# Patient Record
Sex: FEMALE | Race: WHITE | NOT HISPANIC OR LATINO | Employment: OTHER | URBAN - METROPOLITAN AREA
[De-identification: names, ages, dates, MRNs, and addresses within clinical notes are randomized per-mention and may not be internally consistent; named-entity substitution may affect disease eponyms.]

---

## 2017-06-05 LAB
ALBUMIN SERPL BCP-MCNC: 3.7 G/DL (ref 3.2–4.9)
ALBUMIN/GLOB SERPL: 0.9 G/DL
ALP SERPL-CCNC: 99 U/L (ref 30–99)
ALT SERPL-CCNC: 32 U/L (ref 2–50)
ANION GAP SERPL CALC-SCNC: 12 MMOL/L (ref 0–11.9)
AST SERPL-CCNC: 22 U/L (ref 12–45)
BASOPHILS # BLD AUTO: 0.5 % (ref 0–1.8)
BASOPHILS # BLD: 0.08 K/UL (ref 0–0.12)
BILIRUB SERPL-MCNC: 0.9 MG/DL (ref 0.1–1.5)
BUN SERPL-MCNC: 12 MG/DL (ref 8–22)
CALCIUM SERPL-MCNC: 9.1 MG/DL (ref 8.5–10.5)
CHLORIDE SERPL-SCNC: 101 MMOL/L (ref 96–112)
CO2 SERPL-SCNC: 21 MMOL/L (ref 20–33)
CREAT SERPL-MCNC: 0.52 MG/DL (ref 0.5–1.4)
EOSINOPHIL # BLD AUTO: 0.16 K/UL (ref 0–0.51)
EOSINOPHIL NFR BLD: 1.1 % (ref 0–6.9)
ERYTHROCYTE [DISTWIDTH] IN BLOOD BY AUTOMATED COUNT: 43.6 FL (ref 35.9–50)
GFR SERPL CREATININE-BSD FRML MDRD: >60 ML/MIN/1.73 M 2
GLOBULIN SER CALC-MCNC: 4.1 G/DL (ref 1.9–3.5)
GLUCOSE SERPL-MCNC: 119 MG/DL (ref 65–99)
HCT VFR BLD AUTO: 40 % (ref 37–47)
HGB BLD-MCNC: 13.6 G/DL (ref 12–16)
IMM GRANULOCYTES # BLD AUTO: 0.05 K/UL (ref 0–0.11)
IMM GRANULOCYTES NFR BLD AUTO: 0.3 % (ref 0–0.9)
LIPASE SERPL-CCNC: 9 U/L (ref 11–82)
LYMPHOCYTES # BLD AUTO: 3.42 K/UL (ref 1–4.8)
LYMPHOCYTES NFR BLD: 22.9 % (ref 22–41)
MCH RBC QN AUTO: 29.2 PG (ref 27–33)
MCHC RBC AUTO-ENTMCNC: 34 G/DL (ref 33.6–35)
MCV RBC AUTO: 86 FL (ref 81.4–97.8)
MONOCYTES # BLD AUTO: 0.83 K/UL (ref 0–0.85)
MONOCYTES NFR BLD AUTO: 5.6 % (ref 0–13.4)
NEUTROPHILS # BLD AUTO: 10.38 K/UL (ref 2–7.15)
NEUTROPHILS NFR BLD: 69.6 % (ref 44–72)
NRBC # BLD AUTO: 0 K/UL
NRBC BLD AUTO-RTO: 0 /100 WBC
PLATELET # BLD AUTO: 309 K/UL (ref 164–446)
PMV BLD AUTO: 10.1 FL (ref 9–12.9)
POTASSIUM SERPL-SCNC: 3.1 MMOL/L (ref 3.6–5.5)
PROT SERPL-MCNC: 7.8 G/DL (ref 6–8.2)
RBC # BLD AUTO: 4.65 M/UL (ref 4.2–5.4)
SODIUM SERPL-SCNC: 134 MMOL/L (ref 135–145)
WBC # BLD AUTO: 14.9 K/UL (ref 4.8–10.8)

## 2017-06-05 PROCEDURE — 94760 N-INVAS EAR/PLS OXIMETRY 1: CPT

## 2017-06-05 PROCEDURE — 84703 CHORIONIC GONADOTROPIN ASSAY: CPT

## 2017-06-05 PROCEDURE — 80053 COMPREHEN METABOLIC PANEL: CPT

## 2017-06-05 PROCEDURE — 99285 EMERGENCY DEPT VISIT HI MDM: CPT

## 2017-06-05 PROCEDURE — 83690 ASSAY OF LIPASE: CPT

## 2017-06-05 PROCEDURE — 36415 COLL VENOUS BLD VENIPUNCTURE: CPT

## 2017-06-05 PROCEDURE — 85025 COMPLETE CBC W/AUTO DIFF WBC: CPT

## 2017-06-05 ASSESSMENT — PAIN SCALES - GENERAL: PAINLEVEL_OUTOF10: 10

## 2017-06-06 ENCOUNTER — HOSPITAL ENCOUNTER (EMERGENCY)
Facility: MEDICAL CENTER | Age: 47
End: 2017-06-06
Attending: EMERGENCY MEDICINE
Payer: MEDICARE

## 2017-06-06 ENCOUNTER — APPOINTMENT (OUTPATIENT)
Dept: RADIOLOGY | Facility: MEDICAL CENTER | Age: 47
End: 2017-06-06
Attending: EMERGENCY MEDICINE
Payer: MEDICARE

## 2017-06-06 VITALS
TEMPERATURE: 99.4 F | HEIGHT: 69 IN | BODY MASS INDEX: 38.79 KG/M2 | RESPIRATION RATE: 16 BRPM | HEART RATE: 64 BPM | DIASTOLIC BLOOD PRESSURE: 81 MMHG | WEIGHT: 261.91 LBS | OXYGEN SATURATION: 92 % | SYSTOLIC BLOOD PRESSURE: 122 MMHG

## 2017-06-06 DIAGNOSIS — S90.822A BLISTER OF LEFT FOOT WITHOUT INFECTION, INITIAL ENCOUNTER: ICD-10-CM

## 2017-06-06 DIAGNOSIS — R10.31 RIGHT LOWER QUADRANT PAIN: ICD-10-CM

## 2017-06-06 DIAGNOSIS — D72.829 LEUKOCYTOSIS, UNSPECIFIED TYPE: ICD-10-CM

## 2017-06-06 LAB
APPEARANCE UR: CLEAR
COLOR UR AUTO: ABNORMAL
GLUCOSE UR QL STRIP.AUTO: NEGATIVE MG/DL
HCG SERPL QL: NEGATIVE
KETONES UR QL STRIP.AUTO: >=160 MG/DL
LEUKOCYTE ESTERASE UR QL STRIP.AUTO: NEGATIVE
NITRITE UR QL STRIP.AUTO: NEGATIVE
PH UR STRIP.AUTO: 6 [PH]
PROT UR QL STRIP: NEGATIVE MG/DL
RBC UR QL AUTO: NEGATIVE
SP GR UR: 1.01

## 2017-06-06 PROCEDURE — 81002 URINALYSIS NONAUTO W/O SCOPE: CPT

## 2017-06-06 PROCEDURE — 304562 HCHG STAT O2 MASK/CANNULA

## 2017-06-06 PROCEDURE — 700105 HCHG RX REV CODE 258: Performed by: EMERGENCY MEDICINE

## 2017-06-06 PROCEDURE — 700117 HCHG RX CONTRAST REV CODE 255: Performed by: EMERGENCY MEDICINE

## 2017-06-06 PROCEDURE — A9270 NON-COVERED ITEM OR SERVICE: HCPCS | Performed by: EMERGENCY MEDICINE

## 2017-06-06 PROCEDURE — 74177 CT ABD & PELVIS W/CONTRAST: CPT

## 2017-06-06 PROCEDURE — 700111 HCHG RX REV CODE 636 W/ 250 OVERRIDE (IP): Performed by: EMERGENCY MEDICINE

## 2017-06-06 PROCEDURE — 96374 THER/PROPH/DIAG INJ IV PUSH: CPT | Mod: XU

## 2017-06-06 PROCEDURE — 700102 HCHG RX REV CODE 250 W/ 637 OVERRIDE(OP): Performed by: EMERGENCY MEDICINE

## 2017-06-06 RX ORDER — GABAPENTIN 100 MG/1
CAPSULE ORAL 3 TIMES DAILY
COMMUNITY
End: 2017-06-07

## 2017-06-06 RX ORDER — OXYCODONE HYDROCHLORIDE AND ACETAMINOPHEN 5; 325 MG/1; MG/1
2 TABLET ORAL ONCE
Status: COMPLETED | OUTPATIENT
Start: 2017-06-06 | End: 2017-06-06

## 2017-06-06 RX ORDER — ONDANSETRON 2 MG/ML
4 INJECTION INTRAMUSCULAR; INTRAVENOUS ONCE
Status: COMPLETED | OUTPATIENT
Start: 2017-06-06 | End: 2017-06-06

## 2017-06-06 RX ORDER — SODIUM CHLORIDE 9 MG/ML
1000 INJECTION, SOLUTION INTRAVENOUS ONCE
Status: COMPLETED | OUTPATIENT
Start: 2017-06-06 | End: 2017-06-06

## 2017-06-06 RX ORDER — BUPROPION HYDROCHLORIDE 100 MG/1
100 TABLET ORAL 2 TIMES DAILY
COMMUNITY
End: 2017-06-07

## 2017-06-06 RX ADMIN — OXYCODONE HYDROCHLORIDE AND ACETAMINOPHEN 2 TABLET: 5; 325 TABLET ORAL at 01:40

## 2017-06-06 RX ADMIN — IOHEXOL 100 ML: 350 INJECTION, SOLUTION INTRAVENOUS at 02:29

## 2017-06-06 RX ADMIN — ONDANSETRON 4 MG: 2 INJECTION INTRAMUSCULAR; INTRAVENOUS at 01:40

## 2017-06-06 RX ADMIN — SODIUM CHLORIDE 1000 ML: 9 INJECTION, SOLUTION INTRAVENOUS at 01:34

## 2017-06-06 ASSESSMENT — ENCOUNTER SYMPTOMS
COUGH: 0
BACK PAIN: 0
CONSTIPATION: 0
VOMITING: 0
NAUSEA: 0
FEVER: 0
DIARRHEA: 0
ABDOMINAL PAIN: 1
CHILLS: 0
SHORTNESS OF BREATH: 0

## 2017-06-06 ASSESSMENT — LIFESTYLE VARIABLES: DO YOU DRINK ALCOHOL: NO

## 2017-06-06 NOTE — ED NOTES
Pt given d/c instructions/follow up care instructions, pt verbalized understanding of POC, pt ambulated to ER lobby, steady gait.

## 2017-06-06 NOTE — ED AVS SNAPSHOT
Home Care Instructions                                                                                                                Cortney Gavin   MRN: [id number]    Department:  [facility], Emergency Dept   Date of Visit:  6/5/2017            [facility], Emergency Dept    [address] [facility] [facility] [address]    Phone:  [phone]      You were seen by     Sandor Sánchez M.D.      Your Diagnosis Was     Right lower quadrant pain     R10.31       These are the medications you received during your hospitalization from 06/05/2017 1917 to 06/06/2017 0424     Date/Time Order Dose Route Action    06/06/2017 0134 NS infusion 1,000 mL 1,000 mL Intravenous New Bag    06/06/2017 0140 ondansetron (ZOFRAN) syringe/vial injection 4 mg 4 mg Intravenous Given    06/06/2017 0140 oxycodone-acetaminophen (PERCOCET) 5-325 MG per tablet 2 Tab 2 Tab Oral Given    06/06/2017 0229 iohexol (OMNIPAQUE) 350 mg/mL 100 mL Intravenous Given      Follow-up Information     1. Follow up with Your Physician. Schedule an appointment as soon as possible for a visit in 3 days.    Specialty:  Emergency Medicine    Contact information    Varies          2. Follow up with [facility].    Why:  If you need a doctor    Contact information    [address]  [facility] [id number] [phone]          3. Follow up with [facility].    Why:  If you need a doctor    Contact information    [address] [address] [phone]      Medication Information     Review all of your home medications and newly ordered medications with your primary doctor and/or pharmacist as soon as possible. Follow medication instructions as directed by your doctor and/or pharmacist.     Please keep your complete medication list with you and share with your physician. Update the information when medications are discontinued, doses are changed, or new medications (including over-the-counter products) are  added; and carry medication information at all times in the event of emergency situations.               Medication List      ASK your doctor about these medications        Instructions    Morning Afternoon Evening Bedtime    buPROPion 100 MG Tabs   Commonly known as:  WELLBUTRIN        Take 100 mg by mouth 2 times a day.   Dose:  100 mg                        gabapentin 100 MG Caps   Commonly known as:  NEURONTIN        Take  by mouth 3 times a day.                        ondansetron 4 MG Tbdp   Commonly known as:  ZOFRAN ODT        Take 1 Tab by mouth every 8 hours as needed for Nausea/Vomiting.   Dose:  4 mg                                Procedures and tests performed during your visit     CARDIAC MONITORING    CBC WITH DIFFERENTIAL    COMP METABOLIC PANEL    CONSENT FOR CONTRAST INJECTION    CT-ABDOMEN-PELVIS WITH    ESTIMATED GFR    HCG Qual Serum    IV Saline Lock    LIPASE    O2 Protocol    POC UA    Pulse Ox    SALINE LOCK        Discharge Instructions       Return if you have increasing pain, fever, severe vomiting, blood in vomit or stool.   Abdominal Pain, Possible Early Appendicitis    Please follow up with a primary physician for blood pressure management, diabetic screening, and all other preventive health concerns.     Abdominal (belly) pain can be caused by many things. Your caregiver decides the seriousness of your pain by an exam and possibly blood tests and X-rays. Many cases can be observed and treated at home. Most abdominal pain in children is functional. This means it is not caused by a disease. It will probably improve without treatment.  At this time, your caregiver feels that the abdominal pain could possibly be caused by early appendicitis. This means that you will require follow-up. You may be allowed to go home but may need to return for re-examination and repeat lab work.   HOME CARE INSTRUCTIONS   · Do not take or give laxatives unless directed by your caregiver.   · Take pain  medication only if ordered by your caregiver.   · Take no food or water by mouth unless instructed to do so by your caregiver.   SEEK IMMEDIATE MEDICAL CARE IF:   · The pain does not go away or becomes much worse.   · An oral temperature above 102° F (38.9° C) develops.   · Repeated vomiting occurs.   · Blood is being passed in stools (bright red or black tarry stools).   · You develop blood in the urine or cannot pass your urine.   · You develop severe pain in other parts of your body.   MAKE SURE YOU:   · Understand these instructions.   · Will watch your condition.   · Will get help right away if you are not doing well or get worse.   Document Released: 01/06/2009 Document Revised: 03/11/2013 Document Reviewed: 01/06/2009  ExitCare® Patient Information ©2013 [employer].      Blisters  A blister is a fluid-filled sac that forms between layers of skin. Blisters often form in areas where skin rubs against other skin or rubs against something else. The most common areas for blisters are the hands and feet.  CAUSES  A blister can be caused by:  · An injury.  · A burn.  · An allergic reaction.  · An infection.  · Exposure to irritating chemicals.  · Friction.  Friction blisters often result from:  · Sports.  · Repetitive activities.  · Shoes that are too tight or too loose.  SIGNS AND SYMPTOMS  A blister is often round and looks like a bump. It may itch or be painful to the touch. The liquid in a blister is clear or bloody. Before a blister forms, the skin may become red, feel warm, itch, or be painful to the touch.  DIAGNOSIS  A blister can usually be diagnosed from its appearance.  TREATMENT  Treatment involves protecting the area where the blister has formed until the skin has healed. If something is likely to rub against the blister, apply a bandage (dressing) with a hole in the middle over the blister.  Most blisters break open, dry up, and go away on their own within 10 days. Rarely, blisters that are very  painful may be drained before they break open on their own. Draining of a blister should only be done by a health care provider under sterile conditions.  HOME CARE INSTRUCTIONS  · Protect the area where the blister has formed as directed by your health care provider.  · Do not open or pop your blister, because it could become infected.  · If the blister is very painful, ask your health care provider whether you should have it drained.  · If the blister breaks open on its own:  ¨ Do not remove the loose skin that is over the blister.  ¨ Wash the blister area with soap and water every day.  ¨ After washing the blister area, you may apply an antibiotic cream or ointment and cover the area with a bandage.  PREVENTION  Taking these steps can help to prevent blisters that are caused by friction:  · Wear comfortable shoes that fit well.  · Always wear socks with shoes.  · Wear extra socks or use tape, bandages, or pads over blister-prone areas as needed.  · Wear protective gear, such as gloves, when participating in sports or activities that can cause blisters.  · Use powders as needed to keep your feet dry.  SEEK MEDICAL CARE IF:  · You have increased redness, swelling, or pain in the blister area.  · A puslike discharge is coming from the blister area.  · You have a fever.  · You have chills.     This information is not intended to replace advice given to you by your health care provider. Make sure you discuss any questions you have with your health care provider.     Document Released: 01/25/2006 Document Revised: 01/08/2016 Document Reviewed: 07/18/2015  Elsevier Interactive Patient Education ©2016 Elsevier Inc.            Patient Information     Patient Information    Following emergency treatment: all patient requiring follow-up care must return either to a private physician or a clinic if your condition worsens before you are able to obtain further medical attention, please return to the emergency room.     Billing  Information    At [facility], we work to make the billing process streamlined for our patients.  Our Representatives are here to answer any questions you may have regarding your hospital bill.  If you have insurance coverage and have supplied your insurance information to us, we will submit a claim to your insurer on your behalf.  Should you have any questions regarding your bill, we can be reached online or by phone as follows:  Online: You are able pay your bills online or live chat with our representatives about any billing questions you may have. We are here to help Monday - Friday from 8:00am to 7:30pm and 9:00am - 12:00pm on Saturdays.  Please visit [email or web address]  for more information.   Phone:  [phone] or [phone]    Please note that your emergency physician, surgeon, pathologist, radiologist, anesthesiologist, and other specialists are not employed by [facility] and will therefore bill separately for their services.  Please contact them directly for any questions concerning their bills at the numbers below:     Emergency Physician Services:  [phone]  [facility]:  [phone]  [facility]:  [phone]  [facility]:  [phone]    1. Your final bill may vary from the amount quoted upon discharge if all procedures are not complete at that time, or if your doctor has additional procedures of which we are not aware. You will receive an additional bill if you return to the Emergency Department at [facility] for suture removal regardless of the facility of which the sutures were placed.     2. Please arrange for settlement of this account at the emergency registration.    3. All self-pay accounts are due in full at the time of treatment.  If you are unable to meet this obligation then payment is expected within 4-5 days.     4. If you have had radiology studies (CT, X-ray, Ultrasound, MRI), you have  received a preliminary result during your emergency department visit. Please contact the radiology department [phone] to receive a copy of your final result. Please discuss the Final result with your primary physician or with the follow up physician provided.     Crisis Hotline:  [place] Crisis Hotline:  1-176-SVSWDRW or 1-343.979.5498  [place] Crisis Hotline:    1-369.616.8816 or 482-906-3386         ED Discharge Follow Up Questions    1. In order to provide you with very good care, we would like to follow up with a phone call in the next few days.  May we have your permission to contact you?     YES /  NO    2. What is the best phone number to call you? (       )_____-__________    3. What is the best time to call you?      Morning  /  Afternoon  /  Evening                   Patient Signature:  ____________________________________________________________    Date:  ____________________________________________________________      Your appointments     Jun 08, 2017 11:30 AM   New Patient with Deejay Rice M.D.   [facility] (--)    [address] [facility] [place] [address] [phone]           Please bring Photo ID, Insurance Cards, All Medication Bottles and copies of any legal documents (such as Living Will, Power of ) If speaking a language besides English please bring an adult . Please arrive 30 minutes prior for check in and registration. You will be receiving a confirmation call a few days before your appointment from our automated call confirmation system.

## 2017-06-06 NOTE — ED NOTES
"Patient bib EMS, to triage via wheelchair:  Chief Complaint   Patient presents with   • RLQ Pain     intermittent x \"few days\", no tenderness on papation.   • Blisters     left foot, multiple closed blisters on plantar aspect of foot     Patient reports she left [place] a few days ago, has been out walking since then and has been non compliant with bipolar meds.  Pt reports hx of chronic nerve pain in back.    Explained wait time and triage process to pt. Pt to triage 2 for protocol, told to notify ED tech or triage RN of any changes, verbalized understanding.    "

## 2017-06-06 NOTE — ED NOTES
Agree with triage note, pt amb to room, slow steady gait,    Pt in gown, on monitor, chart up for ERP

## 2017-06-06 NOTE — ED AVS SNAPSHOT
6/6/2017    Cortney Gavin  [address]  [place] [address]    Dear Cortney:    [facility] wants to ensure your discharge home is safe and you or your loved ones have had all of your questions answered regarding your care after you leave the hospital.    Below is a list of resources and contact information should you have any questions regarding your hospital stay, follow-up instructions, or active medical symptoms.    Questions or Concerns Regarding… Contact   Medical Questions Related to Your Discharge  (7 days a week, 8am-5pm) Contact a Nurse Care Coordinator   [phone]   Medical Questions Not Related to Your Discharge  (24 hours a day / 7 days a week)  Contact the Nurse Health Line   [phone]    Medications or Discharge Instructions Refer to your discharge packet   or contact your [facility] Primary Care Provider   [phone]   Follow-up Appointment(s) Schedule your appointment via SHAPE   or contact Scheduling [phone]   Billing Review your statement via SHAPE  or contact Billing [phone]   Medical Records Review your records via SHAPE   or contact Medical Records [phone]     You may receive a telephone call within two days of discharge. This call is to make certain you understand your discharge instructions and have the opportunity to have any questions answered. You can also easily access your medical information, test results and upcoming appointments via the SHAPE free online health management tool. You can learn more and sign up at [employer]/SHAPE. For assistance setting up your SHAPE account, please call [phone].    Once again, we want to ensure your discharge home is safe and that you have a clear understanding of any next steps in your care. If you have any questions or concerns, please do not hesitate to contact us, we are here for you. Thank you for choosing [facility] for your healthcare needs.    Sincerely,    Your [facility] Healthcare Team

## 2017-06-06 NOTE — ED NOTES
Break RN: Patient medicated per orders. Currently resting comfortably on gurney. No acute distress. No behavioral pain indicators noted.

## 2017-06-06 NOTE — ED AVS SNAPSHOT
DxUpClose Access Code: [email or web address]  Expires: 7/6/2017  4:23 AM    Your email address is not on file at [employer].  Email Addresses are required for you to sign up for DxUpClose, please contact [phone] to verify your personal information and to provide your email address prior to attempting to register for DxUpClose.    Cortney Gavin  [address] [place] [address]    DxUpClose  A secure, online tool to manage your health information     [employer]’s DxUpClose® is a secure, online tool that connects you to your personalized health information from the privacy of your home -- day or night - making it very easy for you to manage your healthcare. Once the activation process is completed, you can even access your medical information using the DxUpClose shubham, which is available for free in the Apple Shubham store or Google Play store.     To learn more about DxUpClose, visit [email or web address]    There are two levels of access available (as shown below):   My Chart Features  [facility] Primary Care Doctor [facility]  Specialists [facility]  Urgent  Care Non-[facility] Primary Care Doctor   Email your healthcare team securely and privately 24/7 X X X    Manage appointments: schedule your next appointment; view details of past/upcoming appointments X      Request prescription refills. X      View recent personal medical records, including lab and immunizations X X X X   View health record, including health history, allergies, medications X X X X   Read reports about your outpatient visits, procedures, consult and ER notes X X X X   See your discharge summary, which is a recap of your hospital and/or ER visit that includes your diagnosis, lab results, and care plan X X  X     How to register for [employer]:  Once your e-mail address has been verified, follow the following steps to sign up for [employer].     1. Go to  [email or web address]  2. Click on the Sign Up Now box, which takes you to the New Member Sign Up page.  You will need to provide the following information:  a. Enter your [employer] Access Code exactly as it appears at the top of this page. (You will not need to use this code after you’ve completed the sign-up process. If you do not sign up before the expiration date, you must request a new code.)   b. Enter your date of birth.   c. Enter your home email address.   d. Click Submit, and follow the next screen’s instructions.  3. Create a [employer] ID. This will be your [employer] login ID and cannot be changed, so think of one that is secure and easy to remember.  4. Create a [employer] password. You can change your password at any time.  5. Enter your Password Reset Question and Answer. This can be used at a later time if you forget your password.   6. Enter your e-mail address. This allows you to receive e-mail notifications when new information is available in [employer].  7. Click Sign Up. You can now view your health information.    For assistance activating your [employer] account, call [phone]

## 2017-06-06 NOTE — DISCHARGE INSTRUCTIONS
Return if you have increasing pain, fever, severe vomiting, blood in vomit or stool.   Abdominal Pain, Possible Early Appendicitis    Please follow up with a primary physician for blood pressure management, diabetic screening, and all other preventive health concerns.     Abdominal (belly) pain can be caused by many things. Your caregiver decides the seriousness of your pain by an exam and possibly blood tests and X-rays. Many cases can be observed and treated at home. Most abdominal pain in children is functional. This means it is not caused by a disease. It will probably improve without treatment.  At this time, your caregiver feels that the abdominal pain could possibly be caused by early appendicitis. This means that you will require follow-up. You may be allowed to go home but may need to return for re-examination and repeat lab work.   HOME CARE INSTRUCTIONS   · Do not take or give laxatives unless directed by your caregiver.   · Take pain medication only if ordered by your caregiver.   · Take no food or water by mouth unless instructed to do so by your caregiver.   SEEK IMMEDIATE MEDICAL CARE IF:   · The pain does not go away or becomes much worse.   · An oral temperature above 102° F (38.9° C) develops.   · Repeated vomiting occurs.   · Blood is being passed in stools (bright red or black tarry stools).   · You develop blood in the urine or cannot pass your urine.   · You develop severe pain in other parts of your body.   MAKE SURE YOU:   · Understand these instructions.   · Will watch your condition.   · Will get help right away if you are not doing well or get worse.   Document Released: 01/06/2009 Document Revised: 03/11/2013 Document Reviewed: 01/06/2009  [employer].comCare® Patient Information ©2013 [employer]sportgroup.      Blisters  A blister is a fluid-filled sac that forms between layers of skin. Blisters often form in areas where skin rubs against other skin or rubs against something else. The most common areas  for blisters are the hands and feet.  CAUSES  A blister can be caused by:  · An injury.  · A burn.  · An allergic reaction.  · An infection.  · Exposure to irritating chemicals.  · Friction.  Friction blisters often result from:  · Sports.  · Repetitive activities.  · Shoes that are too tight or too loose.  SIGNS AND SYMPTOMS  A blister is often round and looks like a bump. It may itch or be painful to the touch. The liquid in a blister is clear or bloody. Before a blister forms, the skin may become red, feel warm, itch, or be painful to the touch.  DIAGNOSIS  A blister can usually be diagnosed from its appearance.  TREATMENT  Treatment involves protecting the area where the blister has formed until the skin has healed. If something is likely to rub against the blister, apply a bandage (dressing) with a hole in the middle over the blister.  Most blisters break open, dry up, and go away on their own within 10 days. Rarely, blisters that are very painful may be drained before they break open on their own. Draining of a blister should only be done by a health care provider under sterile conditions.  HOME CARE INSTRUCTIONS  · Protect the area where the blister has formed as directed by your health care provider.  · Do not open or pop your blister, because it could become infected.  · If the blister is very painful, ask your health care provider whether you should have it drained.  · If the blister breaks open on its own:  ¨ Do not remove the loose skin that is over the blister.  ¨ Wash the blister area with soap and water every day.  ¨ After washing the blister area, you may apply an antibiotic cream or ointment and cover the area with a bandage.  PREVENTION  Taking these steps can help to prevent blisters that are caused by friction:  · Wear comfortable shoes that fit well.  · Always wear socks with shoes.  · Wear extra socks or use tape, bandages, or pads over blister-prone areas as needed.  · Wear protective gear,  such as gloves, when participating in sports or activities that can cause blisters.  · Use powders as needed to keep your feet dry.  SEEK MEDICAL CARE IF:  · You have increased redness, swelling, or pain in the blister area.  · A puslike discharge is coming from the blister area.  · You have a fever.  · You have chills.     This information is not intended to replace advice given to you by your health care provider. Make sure you discuss any questions you have with your health care provider.     Document Released: 01/25/2006 Document Revised: 01/08/2016 Document Reviewed: 07/18/2015  [employer] Interactive Patient Education ©2016 Elsevier Inc.

## 2017-06-06 NOTE — ED PROVIDER NOTES
"ED Provider Note    Scribed for Sandor Sánchez M.D. by Jonna Paz. 6/6/2017, 1:06 AM.    Primary care provider: Bianca Lara M.D.  Means of arrival: EMS  History obtained from: patient  History limited by: none    CHIEF COMPLAINT  Chief Complaint   Patient presents with   • RLQ Pain     intermittent x \"few days\", no tenderness on papation.   • Blisters     left foot, multiple closed blisters on plantar aspect of foot       HPI  Cortney Gavin is a 46 y.o. female who presents to the Emergency Department for intermittent right lower quadrant pain the last few days. Discomfort is described as sharp and does not shoot into her back. Patient also reports blisters on left foot with associated foot pain. She denies fever, chills, nausea, vomiting, diarrhea, constipation, dysuria, hematuria, vaginal discharge, chest pain, shortness of breath, cough, or rash. Patient still has her gallbladder. She is allergic to norco where lips tend to swell. She is okay with oxycodone.     REVIEW OF SYSTEMS  Review of Systems   Constitutional: Negative for fever and chills.   Respiratory: Negative for cough and shortness of breath.    Cardiovascular: Negative for chest pain.   Gastrointestinal: Positive for abdominal pain. Negative for nausea, vomiting, diarrhea and constipation.   Genitourinary: Negative for dysuria and hematuria.        Negative for vaginal discharge   Musculoskeletal: Negative for back pain.        Positive for foot pain   Skin: Negative for rash.        Positive for blisters   All other systems reviewed and are negative.  C    PAST MEDICAL HISTORY   has a past medical history of Chronic back pain and Schizophrenia (CMS-HCC).    SURGICAL HISTORY  patient denies any surgical history    SOCIAL HISTORY  Social History   Substance Use Topics   • Smoking status: Never Smoker    • Smokeless tobacco: None   • Alcohol Use: No      History   Drug Use   • Yes   • Special: Inhaled     Comment: thc       FAMILY " "HISTORY  History reviewed. No pertinent family history.    CURRENT MEDICATIONS  Home Medications     Reviewed by Vitaliy Altamirano R.N. (Registered Nurse) on 06/06/17 at 0040  Med List Status: Partial    Medication Last Dose Status    buPROPion (WELLBUTRIN) 100 MG Tab  Active    gabapentin (NEURONTIN) 100 MG Cap  Active    ondansetron (ZOFRAN ODT) 4 MG TABLET DISPERSIBLE  Active                ALLERGIES  Allergies   Allergen Reactions   • Norco [Apap-Fd&C Yellow #10 [place]-Hydrocodone] Itching       PHYSICAL EXAM  VITAL SIGNS: /81 mmHg  Pulse 87  Temp(Src) 37.4 °C (99.4 °F)  Resp 16  Ht 1.753 m (5' 9.02\")  Wt 118.8 kg (261 lb 14.5 oz)  BMI 38.66 kg/m2  SpO2 95%    Constitutional: Well developed, Well nourished, mild distress.   HENT: Normocephalic, Atraumatic, Oropharynx moist.   Eyes: Conjunctiva normal, No discharge.   Neck: Supple, No stridor  Cardiovascular: Normal heart rate, Normal rhythm, No murmurs, equal pulses.   Pulmonary: Normal breath sounds, No respiratory distress, No wheezing, No rales, No rhonchi.  Chest: No chest wall tenderness or deformity.   Abdomen:Soft, obese, moderate tenderness to right upper quadrant, No masses, no rebound, slight guarding.   Back: No CVA tenderness.   Musculoskeletal: No major deformities noted, 2x3cm blister to plantar surface of ball of left foot, no surrounding erythema  Skin: Warm, Dry, 2x3cm blister to plantar surface of ball of left foot, no surrounding erythema  Neurologic: Alert & oriented x 3, Normal motor function,  No focal deficits noted.   Psychiatric: Affect normal, Judgment normal, Mood normal.     DIAGNOSTIC STUDIES/PROCEDURES  LABS  Results for orders placed or performed during the hospital encounter of 06/06/17   CBC WITH DIFFERENTIAL   Result Value Ref Range    WBC 14.9 (H) 4.8 - 10.8 K/uL    RBC 4.65 4.20 - 5.40 M/uL    Hemoglobin 13.6 12.0 - 16.0 g/dL    Hematocrit 40.0 37.0 - 47.0 %    MCV 86.0 81.4 - 97.8 fL    MCH 29.2 27.0 - 33.0 pg    " MCHC 34.0 33.6 - 35.0 g/dL    RDW 43.6 35.9 - 50.0 fL    Platelet Count 309 164 - 446 K/uL    MPV 10.1 9.0 - 12.9 fL    Neutrophils-Polys 69.60 44.00 - 72.00 %    Lymphocytes 22.90 22.00 - 41.00 %    Monocytes 5.60 0.00 - 13.40 %    Eosinophils 1.10 0.00 - 6.90 %    Basophils 0.50 0.00 - 1.80 %    Immature Granulocytes 0.30 0.00 - 0.90 %    Nucleated RBC 0.00 /100 WBC    Neutrophils (Absolute) 10.38 (H) 2.00 - 7.15 K/uL    Lymphs (Absolute) 3.42 1.00 - 4.80 K/uL    Monos (Absolute) 0.83 0.00 - 0.85 K/uL    Eos (Absolute) 0.16 0.00 - 0.51 K/uL    Baso (Absolute) 0.08 0.00 - 0.12 K/uL    Immature Granulocytes (abs) 0.05 0.00 - 0.11 K/uL    NRBC (Absolute) 0.00 K/uL   COMP METABOLIC PANEL   Result Value Ref Range    Sodium 134 (L) 135 - 145 mmol/L    Potassium 3.1 (L) 3.6 - 5.5 mmol/L    Chloride 101 96 - 112 mmol/L    Co2 21 20 - 33 mmol/L    Anion Gap 12.0 (H) 0.0 - 11.9    Glucose 119 (H) 65 - 99 mg/dL    Bun 12 8 - 22 mg/dL    Creatinine 0.52 0.50 - 1.40 mg/dL    Calcium 9.1 8.5 - 10.5 mg/dL    AST(SGOT) 22 12 - 45 U/L    ALT(SGPT) 32 2 - 50 U/L    Alkaline Phosphatase 99 30 - 99 U/L    Total Bilirubin 0.9 0.1 - 1.5 mg/dL    Albumin 3.7 3.2 - 4.9 g/dL    Total Protein 7.8 6.0 - 8.2 g/dL    Globulin 4.1 (H) 1.9 - 3.5 g/dL    A-G Ratio 0.9 g/dL   LIPASE   Result Value Ref Range    Lipase 9 (L) 11 - 82 U/L   ESTIMATED GFR   Result Value Ref Range    GFR If African American >60 >60 mL/min/1.73 m 2    GFR If Non African American >60 >60 mL/min/1.73 m 2   HCG Qual Serum   Result Value Ref Range    Beta-Hcg Qualitative Serum Negative Negative   POC UA   Result Value Ref Range    POC Color Kristie     POC Appearance Clear     POC Glucose Negative Negative mg/dL    POC Ketones >=160 (A) Negative mg/dL    POC Specific Gravity 1.015 1.005-1.030    POC Blood Negative Negative    POC Urine PH 6.0 5.0-8.0    POC Protein Negative Negative mg/dL    POC Nitrites Negative Negative    POC Leukocyte Esterase Negative Negative   All labs  reviewed by me.    RADIOLOGY  CT-ABDOMEN-PELVIS WITH   Final Result      1.  Normal appendix.   2.  No bowel obstruction or pneumoperitoneum.   3.  Probable LEFT ovarian follicle.        The radiologist's interpretation of all radiological studies have been reviewed by me.    COURSE & MEDICAL DECISION MAKING  Pertinent Labs & Imaging studies reviewed. (See chart for details)    Differential Diagnosis includes but not limited to: appendicitis, UTI     1:11 AM - Patient seen and examined at bedside. Patient will be treated with 350mg omnipaque, 1000ml NS infusion for right lower quadrant pain, 4mg zofran, 325mg percocet. Ordered cbc with differential, comp metabolic panel, and other labs to evaluate her symptoms. She will be tested for appendicitis.    3:51 AM Patient reevaluated at bedside. Patient resting. Discussed resulted studies and that they are normal. She denies vaginal discharge. Discussed plan for discharge; I advised the patient to follow-up with [facility], and to return to the [facility] ED with any new or worsening symptoms, including fever. Patient was given the opportunity for questions. I addressed all questions or concerns at this time and they verbalize agreement to the plan of care.    Medical Decision Making: This point I'm exact etiology of the patient's right lower quadrant abdominal pain is unclear. Patient did have significant pain and an elevated white blood cell count therefore is concerned she may have acute appendicitis CT of the abdomen and pelvis is negative. Her urine is negative for UTI. She is not sexually active and denies any vaginal discharge and did not think she has an STD. The patient's blisters are intact do not appear to be infected.     The patient will return for new or worsening symptoms and is stable at the time of discharge.    The patient is referred to a primary physician for blood pressure management, diabetic screening, and for all other preventative health  concerns.    DISPOSITION:  Patient will be discharged home in stable condition.    FOLLOW UP:  Your Physician  Varies    Schedule an appointment as soon as possible for a visit in 3 days      [facility]  [address]  [place] [address]  [phone]      If you need a doctor    [address] [address]  [phone]    If you need a doctor      OUTPATIENT MEDICATIONS:  New Prescriptions    No medications on file     FINAL IMPRESSION  1. Right lower quadrant pain    2. Leukocytosis, unspecified type    3. Blister of left foot without infection, initial encounter       Jonna RIVAS (Alex), am scribing for, and in the presence of, Sandor Sánchez M.D.    Electronically signed by: Jonna Lauren), 6/6/2017    Sandor RIVAS M.D. personally performed the services described in this documentation, as scribed by Jonna Paz in my presence, and it is both accurate and complete.    The note accurately reflects work and decisions made by me.  Sandor Sánchez  6/6/2017  5:27 AM

## 2017-06-07 ENCOUNTER — HOSPITAL ENCOUNTER (EMERGENCY)
Facility: MEDICAL CENTER | Age: 47
End: 2017-06-08
Attending: EMERGENCY MEDICINE
Payer: MEDICARE

## 2017-06-07 DIAGNOSIS — F32.A DEPRESSION, UNSPECIFIED DEPRESSION TYPE: ICD-10-CM

## 2017-06-07 DIAGNOSIS — R45.851 SUICIDAL IDEATION: ICD-10-CM

## 2017-06-07 LAB — TSH SERPL DL<=0.005 MIU/L-ACNC: 2.05 UIU/ML (ref 0.3–3.7)

## 2017-06-07 PROCEDURE — 84443 ASSAY THYROID STIM HORMONE: CPT

## 2017-06-07 PROCEDURE — 700102 HCHG RX REV CODE 250 W/ 637 OVERRIDE(OP): Performed by: PSYCHIATRY & NEUROLOGY

## 2017-06-07 PROCEDURE — A9270 NON-COVERED ITEM OR SERVICE: HCPCS | Performed by: PSYCHIATRY & NEUROLOGY

## 2017-06-07 PROCEDURE — A9270 NON-COVERED ITEM OR SERVICE: HCPCS | Performed by: EMERGENCY MEDICINE

## 2017-06-07 PROCEDURE — 700102 HCHG RX REV CODE 250 W/ 637 OVERRIDE(OP): Performed by: EMERGENCY MEDICINE

## 2017-06-07 PROCEDURE — 99285 EMERGENCY DEPT VISIT HI MDM: CPT

## 2017-06-07 PROCEDURE — 90791 PSYCH DIAGNOSTIC EVALUATION: CPT

## 2017-06-07 RX ORDER — TRAZODONE HYDROCHLORIDE 50 MG/1
150 TABLET ORAL
Status: DISCONTINUED | OUTPATIENT
Start: 2017-06-07 | End: 2017-06-08 | Stop reason: HOSPADM

## 2017-06-07 RX ORDER — ARIPIPRAZOLE 10 MG/1
10 TABLET ORAL DAILY
Status: DISCONTINUED | OUTPATIENT
Start: 2017-06-07 | End: 2017-06-08

## 2017-06-07 RX ADMIN — TRAZODONE HYDROCHLORIDE 150 MG: 50 TABLET ORAL at 22:55

## 2017-06-07 RX ADMIN — ARIPIPRAZOLE 10 MG: 10 TABLET ORAL at 15:00

## 2017-06-07 ASSESSMENT — PAIN SCALES - GENERAL: PAINLEVEL_OUTOF10: 0

## 2017-06-07 NOTE — ED NOTES
Received fax from [facility]  Pt was discharged with prescriptions for:  Claritin 10mg QD  Trazodone 150mg QHS  Tegretol 200mg BID  Geodon 60mg QHS  Wellbutrin 100mg QAM  Gabapentin 300mg TID  Ibuprofen 800mg QAM

## 2017-06-07 NOTE — ED NOTES
"PT was recently treated at [facility] (maybe) for SI. SHe is still feeling hopeless, depressed and suicidal. When they discharged her on her medications she was told to follow up with her psychiatrist, she did not follow up because \"I am trying to find a new DR because I do not like my old doctor\" pt has not been taking her medications. Link at  for evaluation  "

## 2017-06-07 NOTE — ED NOTES
AMB to [facility] pod with this RN and . Pt amb up self, but has chronic foot pain that she takes medications for at home. Rx to come down and verify and start home med's per ERP order. Pt aware. Familiarized with pod and staff. Pt denies any needs at this time. .
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
## 2017-06-07 NOTE — ED NOTES
Left a message with [facility] medical records to call or fax information from when pt was admitted  Will follow up tomorrow if no information is given by then

## 2017-06-07 NOTE — ED NOTES
RN Rounding Note: Patient resting in bed with eyes closed, awakens easily to voice, no needs at this time.
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
## 2017-06-07 NOTE — ED NOTES
"Med rec complete per pt at bedside   Pt states she was discharged from [facility] sometime last  Week. They gave her prescriptions but pt states she \"misplaced them\"  She thinks she was taking gabapentin, tegretol and wellbutrin, unknown doses  Does not remember what pharmacy she has filled at in the past  Attempting to call [facility] to obtain this information, have not been able  To speak to anyone or leave a message yet (just rings)  Allergies reviewed with pt  Pt denies ABX in last month  "

## 2017-06-07 NOTE — ED PROVIDER NOTES
ED Provider Note    CHIEF COMPLAINT  Chief Complaint   Patient presents with   • Suicidal Ideation   • Depression       HPI  Cortney Gavin is a 46 y.o. female who presents with depression, for the last day. Suicidal ideation, has been thinking about walking out in traffic because of her depression. Denies alcohol denies other drug. Evidently long history of depression. Evidently her daughter had been murdered several years ago and has been depressed since then.  As explained to me that she was told at one time she had a breast mass, however she has never been a little file, she does not know what side of his arm. No breast pain  REVIEW OF SYSTEMS  See HPI for further details. History of schizophrenia chronic back pain Denies other G.I., G.U.. endrocine, cardiovascular, respriatory or neurological problems. All other systems are negative.     PAST MEDICAL HISTORY  Past Medical History   Diagnosis Date   • Chronic back pain    • Schizophrenia (CMS-HCC)        FAMILY HISTORY  No family history on file.    SOCIAL HISTORY  Social History     Social History   • Marital Status: Single     Spouse Name: N/A   • Number of Children: N/A   • Years of Education: N/A     Social History Main Topics   • Smoking status: Never Smoker    • Smokeless tobacco: Not on file   • Alcohol Use: No   • Drug Use: Yes     Special: Inhaled      Comment: thc   • Sexual Activity: Not on file     Other Topics Concern   • Not on file     Social History Narrative       SURGICAL HISTORY  No past surgical history on file.    CURRENT MEDICATIONS  Home Medications     **Home medications have not yet been reviewed for this encounter**          ALLERGIES  Allergies   Allergen Reactions   • Norco [Apap-Fd&C Yellow #10 [place]-Hydrocodone] Itching       PHYSICAL EXAM  VITAL SIGNS: /88 mmHg  Pulse 89  Temp(Src) 36.7 °C (98.1 °F)  Resp 16  Wt 118 kg (260 lb 2.3 oz)  SpO2 93%  Constitutional: Well developed, Well nourished, No acute distress,  Non-toxic appearance.   HENT: Normocephalic, Atraumatic, Bilateral external ears normal, Oropharynx moist, No oral exudates, Nose normal.   Eyes: PERRL, EOMI, Conjunctiva normal, No discharge.   Neck: Normal range of motion, No tenderness, Supple, No stridor.   Lymphatic: No lymphadenopathy noted.   Cardiovascular: Normal heart rate, Normal rhythm, No murmurs, No rubs, No gallops.   Thorax & Lungs: Normal breath sounds, No respiratory distress, No wheezing, No chest tenderness. States she has a history of breast tumor however on FAST exam was unable to find any evidence of mass, right or left  Abdomen:  No tenderness, no guarding no rigidity and the abdomen is soft.  No masses, No pulsatile masses.  Skin: Warm, Dry, No erythema, No rash.   Back: No tenderness, No CVA tenderness.   Extremities: Intact distal pulses, No edema, No tenderness, No cyanosis, No clubbing.   Musculoskeletal: Good range of motion in all major joints. No tenderness to palpation or major deformities noted.   Neurologic: Alert & oriented x 3, Normal motor function, Normal sensory function, No focal deficits noted.   Psychiatric: Affect is one of depression, mood is one of sadness, suicidal ideation no hallucinations.      RADIOLOGY/PROCEDURES      COURSE & MEDICAL DECISION MAKING  Pertinent Labs & Imaging studies reviewed. (See chart for details)    Unable to find any breast mass on exam. She does have suicidal ideation, is being assessed by the psychiatric service.  FINAL IMPRESSION  1.   1. Depression, unspecified depression type    2. Suicidal ideation        2.   3.     Disposition  This patient will be transferred to psychiatric facility. Legal forearms facilitating transport and transfer have been filled out.  Electronically signed by: Manuel Bee, 6/7/2017 9:35 AM

## 2017-06-08 VITALS
OXYGEN SATURATION: 96 % | SYSTOLIC BLOOD PRESSURE: 121 MMHG | HEART RATE: 72 BPM | TEMPERATURE: 98.8 F | RESPIRATION RATE: 18 BRPM | WEIGHT: 260.14 LBS | BODY MASS INDEX: 38.4 KG/M2 | DIASTOLIC BLOOD PRESSURE: 69 MMHG

## 2017-06-08 LAB
AMPHET UR QL SCN: NEGATIVE
BARBITURATES UR QL SCN: NEGATIVE
BENZODIAZ UR QL SCN: NEGATIVE
BZE UR QL SCN: NEGATIVE
CANNABINOIDS UR QL SCN: NEGATIVE
HCG UR QL: NEGATIVE
MDMA UR QL SCN: NEGATIVE
METHADONE UR QL SCN: NEGATIVE
OPIATES UR QL SCN: NEGATIVE
OXYCODONE UR QL SCN: NEGATIVE
PCP UR QL SCN: NEGATIVE
POC BREATHALIZER: 0 PERCENT (ref 0–0.01)
PROPOXYPH UR QL SCN: NEGATIVE
SP GR UR REFRACTOMETRY: 1.03

## 2017-06-08 PROCEDURE — A9270 NON-COVERED ITEM OR SERVICE: HCPCS | Performed by: EMERGENCY MEDICINE

## 2017-06-08 PROCEDURE — 302970 POC BREATHALIZER

## 2017-06-08 PROCEDURE — 700102 HCHG RX REV CODE 250 W/ 637 OVERRIDE(OP): Performed by: EMERGENCY MEDICINE

## 2017-06-08 PROCEDURE — A9270 NON-COVERED ITEM OR SERVICE: HCPCS | Performed by: PSYCHIATRY & NEUROLOGY

## 2017-06-08 PROCEDURE — 80307 DRUG TEST PRSMV CHEM ANLYZR: CPT

## 2017-06-08 PROCEDURE — 81025 URINE PREGNANCY TEST: CPT

## 2017-06-08 PROCEDURE — 99285 EMERGENCY DEPT VISIT HI MDM: CPT

## 2017-06-08 PROCEDURE — 700102 HCHG RX REV CODE 250 W/ 637 OVERRIDE(OP): Performed by: PSYCHIATRY & NEUROLOGY

## 2017-06-08 RX ORDER — ARIPIPRAZOLE 10 MG/1
15 TABLET ORAL DAILY
Status: DISCONTINUED | OUTPATIENT
Start: 2017-06-09 | End: 2017-06-08 | Stop reason: HOSPADM

## 2017-06-08 RX ORDER — LORAZEPAM 1 MG/1
1 TABLET ORAL ONCE
Status: COMPLETED | OUTPATIENT
Start: 2017-06-08 | End: 2017-06-08

## 2017-06-08 RX ADMIN — LORAZEPAM 1 MG: 1 TABLET ORAL at 15:25

## 2017-06-08 RX ADMIN — TRAZODONE HYDROCHLORIDE 150 MG: 50 TABLET ORAL at 21:17

## 2017-06-08 RX ADMIN — ARIPIPRAZOLE 10 MG: 10 TABLET ORAL at 09:42

## 2017-06-08 ASSESSMENT — PAIN SCALES - GENERAL: PAINLEVEL_OUTOF10: 0

## 2017-06-08 NOTE — ED NOTES
Pt resting in bed in NAD.  Pt calm and cooperative at this time, denies needs.  Will continue to monitor.

## 2017-06-08 NOTE — DISCHARGE PLANNING
Medical Social Work    Referral: Legal Hold    Intervention: Legal Hold Paperwork given to SW by [employer] RN Link    Legal Hold Initiated: Date: 6/7/2017 Time: 0945    Patient’s Insurance Listed on Face Sheet: Medicare/FFS    Referrals sent to: JERRY,YOMI,[facility]    This referral contains the following information:  1) Face sheet __X__  2) Page 1 and Page 2 of Legal Hold X____  3) Alert Team Assessment/Psych Assessment _X___  4) Head to toe physical exam _X___  5) Urine Drug Screen __X__  6) Blood Alcohol __X__  7) Vital signs __X__  8) Pregnancy test when applicable _X__  9) Medications list __X__    Fax confirmation received.     Plan: Patient will transfer to mental health facility once acceptance is obtained

## 2017-06-08 NOTE — DISCHARGE PLANNING
Alert team note:  Patient sleeping now.  No concerns expressed by RN.  Continue legal hold as written.

## 2017-06-08 NOTE — ED NOTES
Patients vital rechecked, VSS. Patient calm and cooperative at this time. Will continue to monitor.

## 2017-06-08 NOTE — ED NOTES
Pt laying bed talking to herself loudly.  PT was asked to keep her voice down.  Pt compliant at this time.

## 2017-06-08 NOTE — ED NOTES
PT was walking around with barefeet.  Had pt wash feet and put on hospital socks.  This does not help with Pt's pain but to help with pt safety.

## 2017-06-08 NOTE — PSYCHIATRY
"PSYCHIATRIC FOLLOW UP:    Reason for Admission: si  Legal hold status:   Hold extended  Psychiatric Supervising Attending:         HPI:     47 y/o woman with BAD, she was here with suicidal ideation, recent admission, very psychotic. Tolerating medication and feels like voices are getting better but states \"no one understands they don't come all the time. I don't always need medication for it. I just take it prn\". It is noted that she speaks to herself most of the time she is not engaged in a conversation with staff.       Psychiatric Examination: observed phenomenon:  Vitals:  Filed Vitals:    06/08/17 0000 06/08/17 0400 06/08/17 0800 06/08/17 1200   BP: 112/59 103/60 107/58 120/59   Pulse: 71 68 66 76   Temp: 36.9 °C (98.4 °F) 36.4 °C (97.6 °F) 36.9 °C (98.5 °F) 37.1 °C (98.7 °F)   Resp: 14 14 14 14   Weight:       SpO2: 91% 91% 93% 95%       Musculoskeletal(abnormal movements, gait, etc):psychomotor retardation , patient is talking to herself.   Appearance:grooming poor   Thoughts: circumstantial   Speech:tone flat   Mood:    depressed  Affect:    mildlyl labile   SI/HI:   +  Attention/Alertness:   Awake, aler   Memory:    Grossly intact   Orientation:      Fund of Knowledge:    decreased  Insight/Judgement into symptoms poor   Neurological Testing:( ie clock, cube drawing, MMSE, MOCA,etc.)    Medical systems reviewed: (pain, new complaint, etc)           Lab results/tests:        Assessment:(acuity level)          Bipolar Disorder     Plan:  legal hold: hold extended  Increasing abilify to 15mg po daily           Continue trazodone   "

## 2017-06-08 NOTE — ED NOTES
Pt resting in bed in NAD with eyes closed.  Symmetrical chest rise and fall.  Will continue to monitor.

## 2017-06-08 NOTE — ED NOTES
Patient's home medications have been reviewed by the pharmacy team.     Past Medical History   Diagnosis Date   • Chronic back pain    • Schizophrenia (CMS-[facility])      Fax from [facility] confirmed these were the meds he had been discharged with:  Claritin 10mg QD  Trazodone 150mg QHS  Tegretol 200mg BID  Geodon 60mg QHS  Wellbutrin 100mg QAM  Gabapentin 300mg TID  Ibuprofen 800mg QAM     A: He has not been compliant with his medications.             P:    No recommendations at this time. Patient has been evaluated by the psychiatry team and ordered Abilify 10 mg daily and Trazodone 150 mg QHS.      Nubia Sylvester

## 2017-06-08 NOTE — ED NOTES
Pt complaining on feet and leg pain and having twitching in the lower extremitys.  Called up to green pod and past on the complaint for the pt.

## 2017-06-08 NOTE — ED NOTES
Rounded on tee pod, pt resting watching basketball game at this time, requesting something for bilateral leg pain

## 2017-06-08 NOTE — ED PROVIDER NOTES
ER Provider Addendum Note     Scribed for CONNER QUIÑONEZ by Twyla Toth on 6/8/2017 at 1:12 PM.     This is an addendum to the note on Cortney Gavin.  For further details and full chart entry, see the previously signed ED Provider Note written by Dr. Manuel Bee ([facility]).    1:07 PM On evaluation, patient is doing well.  She offers no complaints.  Awaiting transfer at this time.      ITwyla (Scribe), am scribing for, and in the presence of, Conner Quiñonez M.D.    Electronically signed by: Twyla Toth (Scribe), 6/8/2017    Conner RIVAS M.D. personally performed the services described in this documentation, as scribed by Twyla Toth in my presence, and it is both accurate and complete.      The note accurately reflects work and decisions made by me.  Conner Quiñonez  6/8/2017  1:41 PM

## 2017-06-09 NOTE — DISCHARGE PLANNING
Arranged patient's transport to [facility] at 2130 via [facility].  Miya(DILLAN) notified.

## 2017-06-09 NOTE — DISCHARGE PLANNING
Medical Social Work:  SW contacted by Bipin at [facility] that they would accept pt. Dr. Garcia would be accepting physcian.  Transfer packet complete with original hold . MTM contacted and transfer PCS form sent to [facility].  RN notified of transfer time and packet placed on chart.

## 2017-06-18 ENCOUNTER — HOSPITAL ENCOUNTER (EMERGENCY)
Facility: MEDICAL CENTER | Age: 47
End: 2017-06-18
Attending: EMERGENCY MEDICINE
Payer: MEDICARE

## 2017-06-18 VITALS
WEIGHT: 276 LBS | RESPIRATION RATE: 20 BRPM | HEART RATE: 104 BPM | SYSTOLIC BLOOD PRESSURE: 138 MMHG | OXYGEN SATURATION: 95 % | TEMPERATURE: 98 F | HEIGHT: 69 IN | DIASTOLIC BLOOD PRESSURE: 94 MMHG | BODY MASS INDEX: 40.88 KG/M2

## 2017-06-18 VITALS
RESPIRATION RATE: 18 BRPM | SYSTOLIC BLOOD PRESSURE: 117 MMHG | HEART RATE: 84 BPM | DIASTOLIC BLOOD PRESSURE: 72 MMHG | WEIGHT: 260.36 LBS | TEMPERATURE: 98.7 F | BODY MASS INDEX: 38.43 KG/M2

## 2017-06-18 DIAGNOSIS — F31.9 BIPOLAR 1 DISORDER (HCC): ICD-10-CM

## 2017-06-18 DIAGNOSIS — Z91.148 NON COMPLIANCE W MEDICATION REGIMEN: ICD-10-CM

## 2017-06-18 DIAGNOSIS — G62.9 NEUROPATHY: ICD-10-CM

## 2017-06-18 LAB
POC BREATHALIZER: 0 PERCENT (ref 0–0.01)
POC BREATHALIZER: 0 PERCENT (ref 0–0.01)

## 2017-06-18 PROCEDURE — 302970 POC BREATHALIZER: Performed by: EMERGENCY MEDICINE

## 2017-06-18 PROCEDURE — 700102 HCHG RX REV CODE 250 W/ 637 OVERRIDE(OP): Performed by: EMERGENCY MEDICINE

## 2017-06-18 PROCEDURE — 302970 POC BREATHALIZER

## 2017-06-18 PROCEDURE — 99283 EMERGENCY DEPT VISIT LOW MDM: CPT

## 2017-06-18 PROCEDURE — A9270 NON-COVERED ITEM OR SERVICE: HCPCS | Performed by: EMERGENCY MEDICINE

## 2017-06-18 PROCEDURE — 302449 STATCHG TRIAGE ONLY (STATISTIC)

## 2017-06-18 RX ORDER — GABAPENTIN 300 MG/1
300 CAPSULE ORAL ONCE
Status: COMPLETED | OUTPATIENT
Start: 2017-06-18 | End: 2017-06-18

## 2017-06-18 RX ADMIN — GABAPENTIN 300 MG: 300 CAPSULE ORAL at 23:07

## 2017-06-18 ASSESSMENT — PAIN SCALES - GENERAL: PAINLEVEL_OUTOF10: 10

## 2017-06-18 NOTE — ED AVS SNAPSHOT
Home Care Instructions                                                                                                                Cortney Gavin   MRN: [id number]    Department:  [facility], Emergency Dept   Date of Visit:  6/18/2017            [facility], Emergency Dept    [address] [place] [id number]    Phone:  [phone]      You were seen by     Ania Rosa M.D.      Your Diagnosis Was     Neuropathy (CMS-HCC)     G62.9       These are the medications you received during your hospitalization from 06/18/2017 2200 to 06/18/2017 2309     Date/Time Order Dose Route Action    06/18/2017 2307 gabapentin (NEURONTIN) capsule 300 mg 300 mg Oral Given      Follow-up Information     1. Follow up with [facility], Emergency Dept.    Specialty:  Emergency Medicine    Why:  If symptoms worsen    Contact information    [address] [place] [id number] [phone]      Medication Information     Review all of your home medications and newly ordered medications with your primary doctor and/or pharmacist as soon as possible. Follow medication instructions as directed by your doctor and/or pharmacist.     Please keep your complete medication list with you and share with your physician. Update the information when medications are discontinued, doses are changed, or new medications (including over-the-counter products) are added; and carry medication information at all times in the event of emergency situations.               Medication List      Notice     You have not been prescribed any medications.            Procedures and tests performed during your visit     POC BREATHALIZER        Discharge Instructions       Mood Disorders  Mood disorders are conditions that affect the way a person feels emotionally. The main mood disorders include:  · Depression.  · Bipolar disorder.  · Dysthymia. Dysthymia is a mild, lasting (chronic) depression.  Symptoms of dysthymia are similar to depression, but not as severe.  · Cyclothymia. Cyclothymia includes mood swings, but the highs and lows are not as severe as they are in bipolar disorder. Symptoms of cyclothymia are similar to those of bipolar disorder, but less extreme.  CAUSES   Mood disorders are probably caused by a combination of factors. People with mood disorders seem to have physical and chemical changes in their brains. Mood disorders run in families, so there may be genetic causes. Severe trauma or stressful life events may also increase the risk of mood disorders.   SYMPTOMS   Symptoms of mood disorders depend on the specific type of condition.  Depression symptoms include:  · Feeling sad, worthless, or hopeless.  · Negative thoughts.  · Inability to enjoy one's usual activities.  · Low energy.  · Sleeping too much or too little.  · Appetite changes.  · Crying.  · Concentration problems.  · Thoughts of harming oneself.  Bipolar disorder symptoms include:  · Periods of depression (see above symptoms).  · Mood swings, from sadness and depression, to abnormal elation and excitement.  · Periods of alvarado:  · Racing thoughts.  · Fast speech.  · Poor judgment, and careless, dangerous choices.  · Decreased need for sleep.  · Risky behavior.  · Difficulty concentrating.  · Irritability.  · Increased energy.  · Increased sex drive.  DIAGNOSIS   There are no blood tests or X-rays that can confirm a mood disorder. However, your caregiver may choose to run some tests to make sure that there is not another physical cause for your symptoms. A mood disorder is usually diagnosed after an in-depth interview with a caregiver.  TREATMENT   Mood disorders can be treated with one or more of the following:  · Medicine. This may include antidepressants, mood-stabilizers, or anti-psychotics.  · Psychotherapy (talk therapy).  · Cognitive behavioral therapy. You are taught to recognize negative thoughts and behavior patterns,  and replace them with healthy thoughts and behaviors.  · Electroconvulsive therapy. For very severe cases of deep depression, a series of treatments in which an electrical current is applied to the brain.  · Vagus nerve stimulation. A pulse of electricity is applied to a portion of the brain.  · Transcranial magnetic stimulation. Powerful magnets are placed on the head that produce electrical currents.  · Hospitalization. In severe situations, or when someone is having serious thoughts of harming him or herself, hospitalization may be necessary in order to keep the person safe. This is also done to quickly start and monitor treatment.  HOME CARE INSTRUCTIONS   · Take your medicine exactly as directed.  · Attend all of your therapy sessions.  · Try to eat regular, healthy meals.  · Exercise daily. Exercise may improve mood symptoms.  · Get good sleep.  · Do not drink alcohol or use pot or other drugs. These can worsen mood symptoms and cause anxiety and psychosis.  · Tell your caregiver if you develop any side effects, such as feeling sick to your stomach (nauseous), dry mouth, dizziness, constipation, drowsiness, tremor, weight gain, or sexual symptoms. He or she may suggest things you can do to improve symptoms.  · Learn ways to cope with the stress of having a chronic illness. This includes yoga, meditation, terri chi, or participating in a support group.  · Drink enough water to keep your urine clear or pale yellow. Eat a high-fiber diet. These habits may help you avoid constipation from your medicine.  SEEK IMMEDIATE MEDICAL CARE IF:  · Your mood worsens.  · You have thoughts of hurting yourself or others.  · You cannot care for yourself.  · You develop the sensation of hearing or seeing something that is not actually present (auditory or visual hallucinations).  · You develop abnormal thoughts.  Document Released: 10/15/2010 Document Revised: 03/11/2013 Document Reviewed: 10/15/2010  [employer]® Patient Information  ©2014 [employer].    Peripheral Nerve Problems  Peripheral nerve disorders are problems with the nerves in your arms or legs.  CAUSES   There are many different causes of these disorders. They include:  · Injury.   · Diabetes.   · Chronic alcoholism.   · Toxic chemicals and drugs.   · Vitamin deficiencies.   · Tumors.   · Liver or kidney diseases.   SYMPTOMS   Some of the problems caused are:  · Tingling, burning, pain, and numbness in the extremities and feet.   · Weakness, loss of muscle tone, and size.   DIAGNOSIS   Sometimes blood tests and studies to examine nerve function are needed to make the diagnosis.   TREATMENT   Sometimes peripheral nerve problems can be treated with vitamins, medication, and avoiding known toxins such as alcohol. Please make a follow-up appointment to be sure you are getting better with treatment.   SEEK MEDICAL CARE IF:   · You are not better after one week of treatment.   · You have worsening of problems or have breathing trouble.   Document Released: 01/25/2006 Document Revised: 03/11/2013 Document Reviewed: 12/18/2006  [employer]® Patient Information ©2013 [employer].          Patient Information     Patient Information    Following emergency treatment: all patient requiring follow-up care must return either to a private physician or a clinic if your condition worsens before you are able to obtain further medical attention, please return to the emergency room.     Billing Information    At [facility], we work to make the billing process streamlined for our patients.  Our Representatives are here to answer any questions you may have regarding your hospital bill.  If you have insurance coverage and have supplied your insurance information to us, we will submit a claim to your insurer on your behalf.  Should you have any questions regarding your bill, we can be reached online or by phone as follows:  Online: You are able pay your bills online or live chat with our  representatives about any billing questions you may have. We are here to help Monday - Friday from 8:00am to 7:30pm and 9:00am - 12:00pm on Saturdays.  Please visit [email or web address]  for more information.   Phone:  [phone] or [phone]    Please note that your emergency physician, surgeon, pathologist, radiologist, anesthesiologist, and other specialists are not employed by [facility] and will therefore bill separately for their services.  Please contact them directly for any questions concerning their bills at the numbers below:     Emergency Physician Services:  [phone]  [facility]:  [phone]  [facility]:  [phone]  [facility]:  [phone]    1. Your final bill may vary from the amount quoted upon discharge if all procedures are not complete at that time, or if your doctor has additional procedures of which we are not aware. You will receive an additional bill if you return to the Emergency Department at [facility] for suture removal regardless of the facility of which the sutures were placed.     2. Please arrange for settlement of this account at the emergency registration.    3. All self-pay accounts are due in full at the time of treatment.  If you are unable to meet this obligation then payment is expected within 4-5 days.     4. If you have had radiology studies (CT, X-ray, Ultrasound, MRI), you have received a preliminary result during your emergency department visit. Please contact the radiology department [phone] to receive a copy of your final result. Please discuss the Final result with your primary physician or with the follow up physician provided.     Crisis Hotline:  [place] Crisis Hotline:  9-306-TVGCOJH or 1-750.978.8711  [place] Crisis Hotline:    1-776.108.5667 or 157-927-9737         ED Discharge Follow Up Questions    1. In order to provide you with very good care, we would  like to follow up with a phone call in the next few days.  May we have your permission to contact you?     YES /  NO    2. What is the best phone number to call you? (       )_____-__________    3. What is the best time to call you?      Morning  /  Afternoon  /  Evening                   Patient Signature:  ____________________________________________________________    Date:  ____________________________________________________________

## 2017-06-18 NOTE — ED AVS SNAPSHOT
6/18/2017    Cortney Gavin  [address]  [place] [address]    Dear Cortney:    [facility] wants to ensure your discharge home is safe and you or your loved ones have had all of your questions answered regarding your care after you leave the hospital.    Below is a list of resources and contact information should you have any questions regarding your hospital stay, follow-up instructions, or active medical symptoms.    Questions or Concerns Regarding… Contact   Medical Questions Related to Your Discharge  (7 days a week, 8am-5pm) Contact a Nurse Care Coordinator   [phone]   Medical Questions Not Related to Your Discharge  (24 hours a day / 7 days a week)  Contact the Nurse Health Line   [phone]    Medications or Discharge Instructions Refer to your discharge packet   or contact your [facility] Primary Care Provider   [phone]   Follow-up Appointment(s) Schedule your appointment via [employer]   or contact Scheduling [phone]   Billing Review your statement via [employer]  or contact Billing [phone]   Medical Records Review your records via [employer]   or contact Medical Records [phone]     You may receive a telephone call within two days of discharge. This call is to make certain you understand your discharge instructions and have the opportunity to have any questions answered. You can also easily access your medical information, test results and upcoming appointments via the [employer] free online health management tool. You can learn more and sign up at [employer]. For assistance setting up your [employer] account, please call [phone].    Once again, we want to ensure your discharge home is safe and that you have a clear understanding of any next steps in your care. If you have any questions or concerns, please do not hesitate to contact us, we are here for you. Thank you for choosing [facility] for your healthcare needs.    Sincerely,    Your [facility] Healthcare Team

## 2017-06-18 NOTE — ED AVS SNAPSHOT
Introhive Access Code: [email or web address]  Expires: 7/6/2017  4:23 AM    Your email address is not on file at [facility].  Email Addresses are required for you to sign up for Introhive, please contact [phone] to verify your personal information and to provide your email address prior to attempting to register for Introhive.    Cortney Gavin  [address] [place] [address]    Introhive  A secure, online tool to manage your health information     [facility]’s Introhive® is a secure, online tool that connects you to your personalized health information from the privacy of your home -- day or night - making it very easy for you to manage your healthcare. Once the activation process is completed, you can even access your medical information using the Introhive shubham, which is available for free in the Apple Shubham store or Google Play store.     To learn more about Introhive, visit [email or web address]    There are two levels of access available (as shown below):   My Chart Features  [facility] Primary Care Doctor [facility]  Specialists [facility]  Urgent  Care Non-[facility] Primary Care Doctor   Email your healthcare team securely and privately 24/7 X X X    Manage appointments: schedule your next appointment; view details of past/upcoming appointments X      Request prescription refills. X      View recent personal medical records, including lab and immunizations X X X X   View health record, including health history, allergies, medications X X X X   Read reports about your outpatient visits, procedures, consult and ER notes X X X X   See your discharge summary, which is a recap of your hospital and/or ER visit that includes your diagnosis, lab results, and care plan X X  X     How to register for Image Metricst:  Once your e-mail address has been verified, follow the following steps to sign up for Image Metricst.     1. Go to  [email or web address]  2. Click on the Sign Up Now box, which takes you to the New Member Sign Up page.  You will need to provide the following information:  a. Enter your SendUs Access Code exactly as it appears at the top of this page. (You will not need to use this code after you’ve completed the sign-up process. If you do not sign up before the expiration date, you must request a new code.)   b. Enter your date of birth.   c. Enter your home email address.   d. Click Submit, and follow the next screen’s instructions.  3. Create a Emtricst ID. This will be your SendUs login ID and cannot be changed, so think of one that is secure and easy to remember.  4. Create a SendUs password. You can change your password at any time.  5. Enter your Password Reset Question and Answer. This can be used at a later time if you forget your password.   6. Enter your e-mail address. This allows you to receive e-mail notifications when new information is available in SendUs.  7. Click Sign Up. You can now view your health information.    For assistance activating your SendUs account, call [phone]

## 2017-06-19 NOTE — DISCHARGE INSTRUCTIONS
Mood Disorders  Mood disorders are conditions that affect the way a person feels emotionally. The main mood disorders include:  · Depression.  · Bipolar disorder.  · Dysthymia. Dysthymia is a mild, lasting (chronic) depression. Symptoms of dysthymia are similar to depression, but not as severe.  · Cyclothymia. Cyclothymia includes mood swings, but the highs and lows are not as severe as they are in bipolar disorder. Symptoms of cyclothymia are similar to those of bipolar disorder, but less extreme.  CAUSES   Mood disorders are probably caused by a combination of factors. People with mood disorders seem to have physical and chemical changes in their brains. Mood disorders run in families, so there may be genetic causes. Severe trauma or stressful life events may also increase the risk of mood disorders.   SYMPTOMS   Symptoms of mood disorders depend on the specific type of condition.  Depression symptoms include:  · Feeling sad, worthless, or hopeless.  · Negative thoughts.  · Inability to enjoy one's usual activities.  · Low energy.  · Sleeping too much or too little.  · Appetite changes.  · Crying.  · Concentration problems.  · Thoughts of harming oneself.  Bipolar disorder symptoms include:  · Periods of depression (see above symptoms).  · Mood swings, from sadness and depression, to abnormal elation and excitement.  · Periods of alvarado:  · Racing thoughts.  · Fast speech.  · Poor judgment, and careless, dangerous choices.  · Decreased need for sleep.  · Risky behavior.  · Difficulty concentrating.  · Irritability.  · Increased energy.  · Increased sex drive.  DIAGNOSIS   There are no blood tests or X-rays that can confirm a mood disorder. However, your caregiver may choose to run some tests to make sure that there is not another physical cause for your symptoms. A mood disorder is usually diagnosed after an in-depth interview with a caregiver.  TREATMENT   Mood disorders can be treated with one or more of the  following:  · Medicine. This may include antidepressants, mood-stabilizers, or anti-psychotics.  · Psychotherapy (talk therapy).  · Cognitive behavioral therapy. You are taught to recognize negative thoughts and behavior patterns, and replace them with healthy thoughts and behaviors.  · Electroconvulsive therapy. For very severe cases of deep depression, a series of treatments in which an electrical current is applied to the brain.  · Vagus nerve stimulation. A pulse of electricity is applied to a portion of the brain.  · Transcranial magnetic stimulation. Powerful magnets are placed on the head that produce electrical currents.  · Hospitalization. In severe situations, or when someone is having serious thoughts of harming him or herself, hospitalization may be necessary in order to keep the person safe. This is also done to quickly start and monitor treatment.  HOME CARE INSTRUCTIONS   · Take your medicine exactly as directed.  · Attend all of your therapy sessions.  · Try to eat regular, healthy meals.  · Exercise daily. Exercise may improve mood symptoms.  · Get good sleep.  · Do not drink alcohol or use pot or other drugs. These can worsen mood symptoms and cause anxiety and psychosis.  · Tell your caregiver if you develop any side effects, such as feeling sick to your stomach (nauseous), dry mouth, dizziness, constipation, drowsiness, tremor, weight gain, or sexual symptoms. He or she may suggest things you can do to improve symptoms.  · Learn ways to cope with the stress of having a chronic illness. This includes yoga, meditation, terri chi, or participating in a support group.  · Drink enough water to keep your urine clear or pale yellow. Eat a high-fiber diet. These habits may help you avoid constipation from your medicine.  SEEK IMMEDIATE MEDICAL CARE IF:  · Your mood worsens.  · You have thoughts of hurting yourself or others.  · You cannot care for yourself.  · You develop the sensation of hearing or seeing  something that is not actually present (auditory or visual hallucinations).  · You develop abnormal thoughts.  Document Released: 10/15/2010 Document Revised: 03/11/2013 Document Reviewed: 10/15/2010  In[employer]alatorCare® Patient Information ©2014 [employer].    Peripheral Nerve Problems  Peripheral nerve disorders are problems with the nerves in your arms or legs.  CAUSES   There are many different causes of these disorders. They include:  · Injury.   · Diabetes.   · Chronic alcoholism.   · Toxic chemicals and drugs.   · Vitamin deficiencies.   · Tumors.   · Liver or kidney diseases.   SYMPTOMS   Some of the problems caused are:  · Tingling, burning, pain, and numbness in the extremities and feet.   · Weakness, loss of muscle tone, and size.   DIAGNOSIS   Sometimes blood tests and studies to examine nerve function are needed to make the diagnosis.   TREATMENT   Sometimes peripheral nerve problems can be treated with vitamins, medication, and avoiding known toxins such as alcohol. Please make a follow-up appointment to be sure you are getting better with treatment.   SEEK MEDICAL CARE IF:   · You are not better after one week of treatment.   · You have worsening of problems or have breathing trouble.   Document Released: 01/25/2006 Document Revised: 03/11/2013 Document Reviewed: 12/18/2006  [employer]® Patient Information ©2013 [employer].

## 2017-06-19 NOTE — ED PROVIDER NOTES
"ED Provider Note    Scribed for No att. providers found by Patt Brasher. 6/18/2017, 11:39 PM.    Primary care provider: Pcp Pt States None  Means of arrival: Walk-in  History obtained from: Patient  History limited by: None    CHIEF COMPLAINT  Chief Complaint   Patient presents with   • Foot Pain     pt with throbbing to feet radiating up to knees and back, ambulatory to triage room barefeet, pt states walked here, recently released from [facility] and was sent to a group home and her prescriptions were not being filled due to insurance so she states she needs her psych medications   • Suicidal Ideation     pt states \"I was suicidal earlier but now I am ok\" co frequent crying episodes        HPI  Cortney Gavin is a 46 y.o. female who presents to the Emergency Department for evaluation of foot pain and suicidal ideation onset today. Per patient, her foot pain is radiating up her knees and to her back. She reports that she has also been experiencing swelling in her ankle. The patient states that she was recently released from [facility] and her prescriptions have not been filled due to her insurance.  The patient denies any suicidal ideation at this time. She has a history of Bipolar Disorder and Schizophrenia.    REVIEW OF SYSTEMS  See HPI for further details. All other systems are negative.   E.    PAST MEDICAL HISTORY   has a past medical history of Chronic back pain and Schizophrenia (CMS-HCC).    SURGICAL HISTORY  patient denies any surgical history    SOCIAL HISTORY  Social History   Substance Use Topics   • Smoking status: Never Smoker    • Smokeless tobacco: None   • Alcohol Use: No      History   Drug Use   • Yes   • Special: Inhaled     Comment: thc       FAMILY HISTORY  History reviewed. No pertinent family history.    CURRENT MEDICATIONS  Reviewed. See Encounter Summary.     ALLERGIES  Allergies   Allergen Reactions   • Norco [Apap-Fd&C Yellow #10 [place]-Hydrocodone] Itching       PHYSICAL " EXAM  VITAL SIGNS: /72 mmHg  Pulse 84  Temp(Src) 37.1 °C (98.7 °F)  Resp 18  Wt 118.1 kg (260 lb 5.8 oz)   Pulse ox interpretation: I interpret this pulse ox as normal.  Constitutional: Alert in no apparent distress.  HENT: Normocephalic, Atraumatic  Eyes: Pupils are equal and reactive. Conjunctiva normal, non-icteric.   Heart: Regular rate and rythm, no murmurs.    Lungs: Clear to auscultation bilaterally.  Abdomen: Non-tender, non-distended, normal bowel sounds  Extremities: Scars to left ankle and chronic joint swelling of lateral aspect with no warmth, DP pulses 2+  Neurologic: Alert, Grossly non-focal.   Psychiatric: Affect normal, Mood normal. Poor judgement, no HI or SI. Impulsive.    DIFFERENTIAL DIAGNOSIS AND WORK UP PLAN    10:38 PM Patient seen and examined at bedside. The patient presents with foot pain and suicidal ideation and the differential diagnosis includes but is not limited to psychosis, medication non-compliance, drug abuse. Patient will be treated with 300 mg Gabapentin capsule. Ordered POC Breathilizer to evaluate her symptoms.     DIAGNOSTIC STUDIES / PROCEDURES       LABS  Labs Reviewed   POC BREATHALIZER - Normal     All labs were reviewed by me.    COURSE & MEDICAL DECISION MAKING  Pertinent Labs & Imaging studies reviewed. (See chart for details)    This is a 46 y.o. year old female who presents with a history of psychosis and neuropathy. The patient is to multiple medications was seen earlier today but left has poor judgment currently is not meet criteria for a hold is not suicidal homicidal knows where she is alert and oriented. The patient given a dose for gabapentin and will be discharged.    /72 mmHg  Pulse 84  Temp(Src) 37.1 °C (98.7 °F)  Resp 18  Wt 118.1 kg (260 lb 5.8 oz)      The patient will return for new or worsening symptoms and is stable at the time of discharge.    DISPOSITION:  Patient will be discharged home in stable condition.    FOLLOW UP:  [facility]  [facility], Emergency Dept  [address] [id number] [phone]    If symptoms worsen      FINAL IMPRESSION  1. Neuropathy (CMS-HCC)    2. Non compliance w medication regimen    3. Bipolar 1 disorder (CMS-HCC)          IPatt (Scribe), am scribing for, and in the presence of, No att. providers found.    Electronically signed by: Patt Brasher (Scribe), 6/18/2017    I, No att. providers found personally performed the services described in this documentation, as scribed by Patt Brasher in my presence, and it is both accurate and complete.    The note accurately reflects work and decisions made by me.  Ania Rosa  6/19/2017  4:11 AM      This dictation has been created using voice recognition software and/or scribes. The accuracy of the dictation is limited by the abilities of the software and the expertise of the scribes. I expect there may be some errors of grammar and possibly content. I made every attempt to manually correct the errors within my dictation. However, errors related to voice recognition software and/or scribes may still exist and should be interpreted within the appropriate context.

## 2017-06-19 NOTE — ED NOTES
"Chief Complaint   Patient presents with   • Off Psych Meds     x3days     Pt bib ems, per report pt was d/c from [facility] into group home 3days ago. She said she \"I feel off\".   She also c/o feet pain, possible neuropathy.   Pt denies hi/si.  "

## 2017-06-19 NOTE — DISCHARGE PLANNING
"Alert team note:  Patient told Diana, ER Triage RN, \"I haven't had my medications at the group home [facility] sent me too.\"  That she wasn't sure what facility she had gone to or what her medications were.  Called [facility] who said she was discharged from 2 days ago.  (In her chart, it says she was transferred to [facility] on 6-8-2017, accepted by Dr Garcia.)  She heard this writer ask the Intake person about getting her medication list.  The patient stormed out of the office.  Cursing and swearing.  Pushed the exterior doors so hard she nearly broke it.  Fully able to walk.  She said she did not bring her shoes because it hurt too much to walk.  She denied any SI/HI.  Not holdable. She left the grounds of [facility].  "

## 2017-06-19 NOTE — ED NOTES
"Pt got up and said \"I am out of here\" while talking to [employer]. left before physician contact, ambulated steady feet.  "

## 2017-06-21 ENCOUNTER — HOSPITAL ENCOUNTER (EMERGENCY)
Facility: MEDICAL CENTER | Age: 47
End: 2017-06-22
Attending: EMERGENCY MEDICINE
Payer: MEDICARE

## 2017-06-21 DIAGNOSIS — T50.905A MEDICATION SIDE EFFECTS, INITIAL ENCOUNTER: ICD-10-CM

## 2017-06-21 PROCEDURE — 99283 EMERGENCY DEPT VISIT LOW MDM: CPT

## 2017-06-21 PROCEDURE — 304562 HCHG STAT O2 MASK/CANNULA

## 2017-06-21 RX ORDER — ZIPRASIDONE HYDROCHLORIDE 60 MG/1
60 CAPSULE ORAL 2 TIMES DAILY
COMMUNITY
End: 2017-09-01

## 2017-06-21 RX ORDER — CARBAMAZEPINE 200 MG/1
200 TABLET ORAL DAILY
COMMUNITY
End: 2017-09-01

## 2017-06-21 RX ORDER — GABAPENTIN 300 MG/1
300 CAPSULE ORAL 2 TIMES DAILY
COMMUNITY
End: 2019-08-22

## 2017-06-21 RX ORDER — BUPROPION HYDROCHLORIDE 100 MG/1
100 TABLET, EXTENDED RELEASE ORAL 2 TIMES DAILY
COMMUNITY
End: 2019-08-22

## 2017-06-21 RX ORDER — QUETIAPINE FUMARATE 200 MG/1
200 TABLET, FILM COATED ORAL ONCE
COMMUNITY
End: 2017-07-19

## 2017-06-21 RX ORDER — IBUPROFEN 200 MG
400 TABLET ORAL EVERY 6 HOURS PRN
Status: SHIPPED | COMMUNITY
End: 2022-06-08

## 2017-06-21 RX ORDER — TRAZODONE HYDROCHLORIDE 150 MG/1
100 TABLET ORAL NIGHTLY PRN
COMMUNITY
End: 2021-06-04

## 2017-06-21 ASSESSMENT — PAIN SCALES - GENERAL: PAINLEVEL_OUTOF10: 0

## 2017-06-21 NOTE — ED AVS SNAPSHOT
Home Care Instructions                                                                                                                Cortney Gavin   MRN: [id number]    Department:  [facility], Emergency Dept   Date of Visit:  6/21/2017            [facility], Emergency Dept    [address] [facility] [address]    Phone:  [phone]      You were seen by     Dylon Villagomez M.D.      Your Diagnosis Was     Medication side effects, initial encounter     T88.7XXA       Follow-up Information     1. Follow up with [facility].    Contact information    [address] [place] [address] [phone]      Medication Information     Review all of your home medications and newly ordered medications with your primary doctor and/or pharmacist as soon as possible. Follow medication instructions as directed by your doctor and/or pharmacist.     Please keep your complete medication list with you and share with your physician. Update the information when medications are discontinued, doses are changed, or new medications (including over-the-counter products) are added; and carry medication information at all times in the event of emergency situations.               Medication List      ASK your doctor about these medications        Instructions    Morning Afternoon Evening Bedtime    buPROPion  MG Tb12   Commonly known as:  WELLBUTRIN-SR        Take 100 mg by mouth 2 times a day.   Dose:  100 mg                        carbamazepine 200 MG Tabs   Commonly known as:  TEGRETOL        Take 200 mg by mouth 2 Times a Day.   Dose:  200 mg                        gabapentin 300 MG Caps   Commonly known as:  NEURONTIN        Take 300 mg by mouth 3 times a day.   Dose:  300 mg                        ibuprofen 800 MG Tabs   Commonly known as:  MOTRIN        Take 800 mg by mouth every day.   Dose:  800 mg                        quetiapine 200 MG Tabs   Commonly known as:   SEROQUEL        Take 200 mg by mouth Once.   Dose:  200 mg                        trazodone 150 MG Tabs   Commonly known as:  DESYREL        Take 150 mg by mouth every evening.   Dose:  150 mg                        ziprasidone 60 MG Caps   Commonly known as:  GEODON        Take 60 mg by mouth 2 Times a Day.   Dose:  60 mg                                Procedures and tests performed during your visit     NURSING COMMUNICATION        Discharge Instructions       Polypharmacy Problems    Do not take seroquel, trazodone and neurontin at the same time.   Follow up with your doctor to discuss medications.   Polypharmacy problems can occur when you take more than one medicine. Your caregivers need to know about all the medicines you take. This includes vitamins, herbs, eyedrops, over-the-counter medicines, prescription medicines, and creams. Drug interaction problems can include:  · Bad reactions or side effects. This can occur when certain drugs are taken together.  · Reduced benefit from your medicines. This can occur if one drug reduces the ability of another drug to work.  Some drug interaction problems can be life-threatening. Your caregivers can coordinate a plan to help you avoid polypharmacy problems.  RISK FACTORS  Polypharmacy problems are more likely to occur if:  · You have many caregivers prescribing different medicines for you. This is a common problem for elderly patients.  · You have a long-term (chronic) medical condition.  · You have had an organ transplant.  · You have human immunodeficiency virus (HIV).  · You are undergoing chemotherapy.  · You have hepatitis.  · You have kidney disease or kidney failure.  · You have significant heart disease or lung disease.  · You are elderly.  · You are taking medicines that have a low margin of error. This means there is little difference between the right amount of medicine and too much medicine. Medicines in this category  include:  ¨ Warfarin.  ¨ Digoxin.  ¨ Lithium.  ¨ Theophylline.  ¨ Monoamine oxidase (MAO) inhibitors.  ¨ Seizure medicines.  ¨ Cyclosporine.  PREVENTION   · Choose one caregiver to be in charge of coordinating your medicines. Inform this caregiver about all medicines and supplements you take, even if they are prescribed by another caregiver.  · Purchase all your medicines through the same pharmacy. The pharmacy keeps track of your medicines and possible drug interactions. They can notify your caregiver of potential problems.  · Read the information given to you at your pharmacy.  · Carry a list of all your medicines and their doses with you. This informs your caregivers, emergency department caregivers, and specialists about the medicines you are taking. This is especially important before you start a new medicine.  · Use a system to keep track of which medicines you are taking and when. Many patients use a pillbox that separates medicines by the day and time they are supposed to be taken.  · Carry a list of your chronic medical problems with you. Conditions such as kidney problems, liver problems, organ transplants, and chronic viral infections affect the way your body handles medicines.  · Ask your caregiver or pharmacist if you have any questions about your medicines.  SEEK MEDICAL CARE IF:  · You have any problems that may be caused by your medicines.  · You have chest pain.  · You have shortness of breath.  · You have an irregular heartbeat.  · You have fainting spells.  · You have shaking or tremors.  · You have weakness or tiredness (lethargy).  · You have a rash or swelling in any part of the body.  · You have increased bleeding, rectal bleeding, vaginal bleeding, or you bruise easily.  · You have abdominal pain.  · You have nausea.  · You have vomiting.     This information is not intended to replace advice given to you by your health care provider. Make sure you discuss any questions you have with your  health care provider.     Document Released: 01/25/2006 Document Revised: 01/08/2016 Document Reviewed: 09/05/2012  Elsevier Interactive Patient Education ©2016 Elsevier Inc.            Patient Information     Patient Information    Following emergency treatment: all patient requiring follow-up care must return either to a private physician or a clinic if your condition worsens before you are able to obtain further medical attention, please return to the emergency room.     Billing Information    At [facility], we work to make the billing process streamlined for our patients.  Our Representatives are here to answer any questions you may have regarding your hospital bill.  If you have insurance coverage and have supplied your insurance information to us, we will submit a claim to your insurer on your behalf.  Should you have any questions regarding your bill, we can be reached online or by phone as follows:  Online: You are able pay your bills online or live chat with our representatives about any billing questions you may have. We are here to help Monday - Friday from 8:00am to 7:30pm and 9:00am - 12:00pm on Saturdays.  Please visit [email or web address]  for more information.   Phone:  [phone] or [phone]    Please note that your emergency physician, surgeon, pathologist, radiologist, anesthesiologist, and other specialists are not employed by [facility] and will therefore bill separately for their services.  Please contact them directly for any questions concerning their bills at the numbers below:     Emer[facility] Physician Services:  [phone]  [facility]:  [phone]  [facility]:  [phone]  [facility]:  [phone]    1. Your final bill may vary from the amount quoted upon discharge if all procedures are not complete at that time, or if your doctor has additional procedures of which we are not aware. You will  receive an additional bill if you return to the Emergency Department at [facility]placed by [facility] for suture removal regardless of the facility of which the sutures were placed.     2. Please arrange for settlement of this account at the emergency registration.    3. All self-pay accounts are due in full at the time of treatment.  If you are unable to meet this obligation then payment is expected within 4-5 days.     4. If you have had radiology studies (CT, X-ray, Ultrasound, MRI), you have received a preliminary result during your emergency department visit. Please contact the radiology department [phone] to receive a copy of your final result. Please discuss the Final result with your primary physician or with the follow up physician provided.     Crisis Hotline:  [place] Crisis Hotline:  5-665-CIDBYVN or 1-535.684.8085  [place] Crisis Hotline:    1-684.620.8632 or 319-973-5831         ED Discharge Follow Up Questions    1. In order to provide you with very good care, we would like to follow up with a phone call in the next few days.  May we have your permission to contact you?     YES /  NO    2. What is the best phone number to call you? (       )_____-__________    3. What is the best time to call you?      Morning  /  Afternoon  /  Evening                   Patient Signature:  ____________________________________________________________    Date:  ____________________________________________________________

## 2017-06-21 NOTE — ED AVS SNAPSHOT
6/22/2017    Cortney Gavin  [address]  [facility] [address]    Dear Cortney:    [facility] wants to ensure your discharge home is safe and you or your loved ones have had all of your questions answered regarding your care after you leave the hospital.    Below is a list of resources and contact information should you have any questions regarding your hospital stay, follow-up instructions, or active medical symptoms.    Questions or Concerns Regarding… Contact   Medical Questions Related to Your Discharge  (7 days a week, 8am-5pm) Contact a Nurse Care Coordinator   [phone]   Medical Questions Not Related to Your Discharge  (24 hours a day / 7 days a week)  Contact the Nurse Health Line   [phone]    Medications or Discharge Instructions Refer to your discharge packet   or contact your [facility] Primary Care Provider   [phone]   Follow-up Appointment(s) Schedule your appointment via [employer]   or contact Scheduling [phone]   Billing Review your statement via [employer]  or contact Billing [phone]   Medical Records Review your records via [employer]   or contact Medical Records [phone]     You may receive a telephone call within two days of discharge. This call is to make certain you understand your discharge instructions and have the opportunity to have any questions answered. You can also easily access your medical information, test results and upcoming appointments via the [employer] free online health management tool. You can learn more and sign up at [email or web address]. For assistance setting up your [employer] account, please call [phone].    Once again, we want to ensure your discharge home is safe and that you have a clear understanding of any next steps in your care. If you have any questions or concerns, please do not hesitate to contact us, we are here for you. Thank you for choosing [facility] for your healthcare needs.    Sincerely,    Your [facility] Healthcare Team

## 2017-06-21 NOTE — ED AVS SNAPSHOT
Motion Engine Access Code: [email or web address]  Expires: 7/6/2017  4:23 AM    Your email address is not on file at [facility].  Email Addresses are required for you to sign up for Motion Engine, please contact [phone] to verify your personal information and to provide your email address prior to attempting to register for Motion Engine.    Cortney Gavin  [address] [place] [address]    Motion Engine  A secure, online tool to manage your health information     [facility]’s Motion Engine® is a secure, online tool that connects you to your personalized health information from the privacy of your home -- day or night - making it very easy for you to manage your healthcare. Once the activation process is completed, you can even access your medical information using the Motion Engine shubham, which is available for free in the Apple Shubham store or Google Play store.     To learn more about Motion Engine, visit [email or web address]    There are two levels of access available (as shown below):   My Chart Features  [facility] Primary Care Doctor [facility]  Specialists [facility]  Urgent  Care Non-[facility] Primary Care Doctor   Email your healthcare team securely and privately 24/7 X X X    Manage appointments: schedule your next appointment; view details of past/upcoming appointments X      Request prescription refills. X      View recent personal medical records, including lab and immunizations X X X X   View health record, including health history, allergies, medications X X X X   Read reports about your outpatient visits, procedures, consult and ER notes X X X X   See your discharge summary, which is a recap of your hospital and/or ER visit that includes your diagnosis, lab results, and care plan X X  X     How to register for Naartjiet:  Once your e-mail address has been verified, follow the following steps to sign up for Naartjiet.     1. Go to  [email or web address]  2. Click on the Sign Up Now box, which takes you to the New Member Sign Up page.  You will need to provide the following information:  a. Enter your [employer] Access Code exactly as it appears at the top of this page. (You will not need to use this code after you’ve completed the sign-up process. If you do not sign up before the expiration date, you must request a new code.)   b. Enter your date of birth.   c. Enter your home email address.   d. Click Submit, and follow the next screen’s instructions.  3. Create a EDANt ID. This will be your [employer] login ID and cannot be changed, so think of one that is secure and easy to remember.  4. Create a [employer] password. You can change your password at any time.  5. Enter your Password Reset Question and Answer. This can be used at a later time if you forget your password.   6. Enter your e-mail address. This allows you to receive e-mail notifications when new information is available in [employer].  7. Click Sign Up. You can now view your health information.    For assistance activating your [employer] account, call [phone]

## 2017-06-22 VITALS
SYSTOLIC BLOOD PRESSURE: 105 MMHG | TEMPERATURE: 97.9 F | HEIGHT: 69 IN | HEART RATE: 58 BPM | RESPIRATION RATE: 16 BRPM | WEIGHT: 260 LBS | BODY MASS INDEX: 38.51 KG/M2 | DIASTOLIC BLOOD PRESSURE: 51 MMHG | OXYGEN SATURATION: 100 %

## 2017-06-22 NOTE — ED NOTES
BIB EMS    Chief Complaint   Patient presents with   • Other     pt filled meds today and took all her meds around 1700 including day and night medications and is drowsy/ sleepying. pt sent by shelter since they cannot monnitor her.        Pt sleeping, on spo2 and BP monitor, chart up for ERP

## 2017-06-22 NOTE — DISCHARGE INSTRUCTIONS
Polypharmacy Problems    Do not take seroquel, trazodone and neurontin at the same time.   Follow up with your doctor to discuss medications.   Polypharmacy problems can occur when you take more than one medicine. Your caregivers need to know about all the medicines you take. This includes vitamins, herbs, eyedrops, over-the-counter medicines, prescription medicines, and creams. Drug interaction problems can include:  · Bad reactions or side effects. This can occur when certain drugs are taken together.  · Reduced benefit from your medicines. This can occur if one drug reduces the ability of another drug to work.  Some drug interaction problems can be life-threatening. Your caregivers can coordinate a plan to help you avoid polypharmacy problems.  RISK FACTORS  Polypharmacy problems are more likely to occur if:  · You have many caregivers prescribing different medicines for you. This is a common problem for elderly patients.  · You have a long-term (chronic) medical condition.  · You have had an organ transplant.  · You have human immunodeficiency virus (HIV).  · You are undergoing chemotherapy.  · You have hepatitis.  · You have kidney disease or kidney failure.  · You have significant heart disease or lung disease.  · You are elderly.  · You are taking medicines that have a low margin of error. This means there is little difference between the right amount of medicine and too much medicine. Medicines in this category include:  ¨ Warfarin.  ¨ Digoxin.  ¨ Lithium.  ¨ Theophylline.  ¨ Monoamine oxidase (MAO) inhibitors.  ¨ Seizure medicines.  ¨ Cyclosporine.  PREVENTION   · Choose one caregiver to be in charge of coordinating your medicines. Inform this caregiver about all medicines and supplements you take, even if they are prescribed by another caregiver.  · Purchase all your medicines through the same pharmacy. The pharmacy keeps track of your medicines and possible drug interactions. They can notify your  caregiver of potential problems.  · Read the information given to you at your pharmacy.  · Carry a list of all your medicines and their doses with you. This informs your caregivers, emergency department caregivers, and specialists about the medicines you are taking. This is especially important before you start a new medicine.  · Use a system to keep track of which medicines you are taking and when. Many patients use a pillbox that separates medicines by the day and time they are supposed to be taken.  · Carry a list of your chronic medical problems with you. Conditions such as kidney problems, liver problems, organ transplants, and chronic viral infections affect the way your body handles medicines.  · Ask your caregiver or pharmacist if you have any questions about your medicines.  SEEK MEDICAL CARE IF:  · You have any problems that may be caused by your medicines.  · You have chest pain.  · You have shortness of breath.  · You have an irregular heartbeat.  · You have fainting spells.  · You have shaking or tremors.  · You have weakness or tiredness (lethargy).  · You have a rash or swelling in any part of the body.  · You have increased bleeding, rectal bleeding, vaginal bleeding, or you bruise easily.  · You have abdominal pain.  · You have nausea.  · You have vomiting.     This information is not intended to replace advice given to you by your health care provider. Make sure you discuss any questions you have with your health care provider.     Document Released: 01/25/2006 Document Revised: 01/08/2016 Document Reviewed: 09/05/2012  [employer] Interactive Patient Education ©2016 [employer] Inc.

## 2017-06-22 NOTE — ED NOTES
Pt given d/c instructions/follow up/home care instructions, pt verbalized understanding of POC, pt ambulated to ER lobby, steady gait.

## 2017-06-22 NOTE — ED PROVIDER NOTES
ED Provider Note    Scribed for Dylon Villagomez M.D. by Colleen Bonilla. 6/21/2017, 8:05 PM.    Primary care provider: Pcp Pt States None  Means of arrival: Ambulance   History obtained from: Patient  History limited by: None     CHIEF COMPLAINT  Chief Complaint   Patient presents with   • Other     pt filled meds today and took all her meds around 1700 including day and night medications and is drowsy/ sleepying. pt sent by shelter since they cannot monnitor her.        HPI  Cortney Gavin is a 46 y.o. female who presents to the Emergency Department after taking all of her daytime and night time medications together 3 hours prior to exam. This was the first day the patient has taken all of her medications. Patient has been sleeping since she took the medications. She notes worsening bilateral ankle pain. She was sent to the ED from the shelter because they cannot accept her in her current state of sleep. She denies fever or chills.     The full HPI is limited as the patient is quite somnolent. She does awaken and respond to questions appropriately however she does fall asleep promptly after answering simple questions.    REVIEW OF SYSTEMS  See HPI, full review of systems unobtainable and unreliable secondary to patient's somnolence.    PAST MEDICAL HISTORY   has a past medical history of Chronic back pain and Schizophrenia (CMS-HCC).    SURGICAL HISTORY  patient denies any surgical history    SOCIAL HISTORY  Social History   Substance Use Topics   • Smoking status: Never Smoker    • Smokeless tobacco: None   • Alcohol Use: No      History   Drug Use   • Yes   • Special: Inhaled     Comment: thc       FAMILY HISTORY  History reviewed. No pertinent family history.    CURRENT MEDICATIONS  Reviewed.  See Encounter Summary.     ALLERGIES  Allergies   Allergen Reactions   • Norco [Apap-Fd&C Yellow #10 [place]-Hydrocodone] Itching       PHYSICAL EXAM  VITAL SIGNS: /51 mmHg  Pulse 77  Temp(Src) 36.6 °C (97.9 °F)   "Resp 18  Ht 1.753 m (5' 9\")  Wt 117.935 kg (260 lb)  BMI 38.38 kg/m2  SpO2 98%  Constitutional: In no apparent distress. Somnolent, easily arousable to voice.   HENT: Normocephalic, atraumatic, Bilateral external ears normal. Nose normal.   Eyes: Conjunctiva normal, non-icteric, EOMI.    Thorax & Lungs: Easy unlabored respirations, Clear to ascultation bilaterally.  Cardiovascular: Regular rate, Regular rhythm, No murmurs, rubs or gallops.  Abdomen:  Soft, nontender, no masses   Skin: Visualized skin is  Dry, No erythema, No rash.   Extremities:   No cyanosis, clubbing or edema.  Neurologic: Somnolent, arousable, Grossly non-focal.   Psychiatric: Normal affect, Normal mood    COURSE & MEDICAL DECISION MAKING  Pertinent Labs & Imaging studies reviewed. (See chart for details)    8:05 PM - Patient seen and examined at bedside. Patient is somnolent, however she is easily arousable to voice.       2:10 AM- the patient is ambulatory to the bathroom, she will be discharged.    Decision Making:  This is a 46 y.o. year old female who presents with hypersomnolence. She states that she took all of her medications at once. I did review the medications, they consist of Seroquel, trazodone, carbamazepine and Neurontin. Her medications are not intended to be taken all daily at bedtime. I suspect her condition is secondary to medication air and polypharmacy. The patient was observed in the emergency department for several hours. She did not have any signs of true altered mental status. She awakened appropriately and answer questions appropriate. After observation, she was much more alert and able to ablate unassisted emergency department. At this time I do not feel that there is any indication for additional workup. She is stable for discharge. She is counseled to not take all these medications at once.  I do recommend she go over her outpatient medications with her primary care physician. Seems unlikely that she will require " trazodone, Seroquel and Neurontin.    DISPOSITION:  Patient will be discharged home in good condition.    Discharge Medications:  New Prescriptions    No medications on file       The patient was discharged home (see d/c instructions) and told to return immediately for any signs or symptoms listed, or any worsening at all.  The patient verbally agreed to the discharge precautions and follow-up plan which is documented in EPIC.      FINAL IMPRESSION  1. Medication side effects, initial encounter          Colleen RIVAS (Scribe), am scribing for, and in the presence of, Dylon Villagomez M.D..    Electronically signed by: Colleen Bonilla (Scribe), 6/21/2017    Dylon RIVAS M.D. personally performed the services described in this documentation, as scribed by Colleen Bonilla in my presence, and it is both accurate and complete.    The note accurately reflects work and decisions made by me.  Dylon Villagomez  6/22/2017  2:13 AM

## 2017-06-28 ENCOUNTER — APPOINTMENT (OUTPATIENT)
Dept: RADIOLOGY | Facility: MEDICAL CENTER | Age: 47
End: 2017-06-28
Attending: EMERGENCY MEDICINE
Payer: MEDICARE

## 2017-06-28 ENCOUNTER — HOSPITAL ENCOUNTER (EMERGENCY)
Facility: MEDICAL CENTER | Age: 47
End: 2017-06-28
Attending: EMERGENCY MEDICINE
Payer: MEDICARE

## 2017-06-28 VITALS
SYSTOLIC BLOOD PRESSURE: 137 MMHG | RESPIRATION RATE: 16 BRPM | HEIGHT: 69 IN | TEMPERATURE: 98.6 F | BODY MASS INDEX: 42.12 KG/M2 | WEIGHT: 284.39 LBS | HEART RATE: 80 BPM | OXYGEN SATURATION: 95 % | DIASTOLIC BLOOD PRESSURE: 87 MMHG

## 2017-06-28 DIAGNOSIS — R60.9 DEPENDENT EDEMA: ICD-10-CM

## 2017-06-28 LAB
ALBUMIN SERPL BCP-MCNC: 3.4 G/DL (ref 3.2–4.9)
ALBUMIN/GLOB SERPL: 1.1 G/DL
ALP SERPL-CCNC: 91 U/L (ref 30–99)
ALT SERPL-CCNC: 38 U/L (ref 2–50)
ANION GAP SERPL CALC-SCNC: 4 MMOL/L (ref 0–11.9)
APPEARANCE UR: CLEAR
AST SERPL-CCNC: 33 U/L (ref 12–45)
BASOPHILS # BLD AUTO: 0.8 % (ref 0–1.8)
BASOPHILS # BLD: 0.05 K/UL (ref 0–0.12)
BILIRUB SERPL-MCNC: 0.2 MG/DL (ref 0.1–1.5)
BNP SERPL-MCNC: 5 PG/ML (ref 0–100)
BUN SERPL-MCNC: 8 MG/DL (ref 8–22)
CALCIUM SERPL-MCNC: 8.4 MG/DL (ref 8.5–10.5)
CHLORIDE SERPL-SCNC: 106 MMOL/L (ref 96–112)
CO2 SERPL-SCNC: 26 MMOL/L (ref 20–33)
COLOR UR AUTO: YELLOW
CREAT SERPL-MCNC: 0.55 MG/DL (ref 0.5–1.4)
EOSINOPHIL # BLD AUTO: 0.31 K/UL (ref 0–0.51)
EOSINOPHIL NFR BLD: 4.8 % (ref 0–6.9)
ERYTHROCYTE [DISTWIDTH] IN BLOOD BY AUTOMATED COUNT: 45.8 FL (ref 35.9–50)
GFR SERPL CREATININE-BSD FRML MDRD: >60 ML/MIN/1.73 M 2
GLOBULIN SER CALC-MCNC: 3.1 G/DL (ref 1.9–3.5)
GLUCOSE SERPL-MCNC: 98 MG/DL (ref 65–99)
GLUCOSE UR QL STRIP.AUTO: NEGATIVE MG/DL
HCT VFR BLD AUTO: 36.5 % (ref 37–47)
HGB BLD-MCNC: 11.9 G/DL (ref 12–16)
IMM GRANULOCYTES # BLD AUTO: 0.03 K/UL (ref 0–0.11)
IMM GRANULOCYTES NFR BLD AUTO: 0.5 % (ref 0–0.9)
KETONES UR QL STRIP.AUTO: NEGATIVE MG/DL
LEUKOCYTE ESTERASE UR QL STRIP.AUTO: ABNORMAL
LYMPHOCYTES # BLD AUTO: 1.83 K/UL (ref 1–4.8)
LYMPHOCYTES NFR BLD: 28.1 % (ref 22–41)
MCH RBC QN AUTO: 29.6 PG (ref 27–33)
MCHC RBC AUTO-ENTMCNC: 32.6 G/DL (ref 33.6–35)
MCV RBC AUTO: 90.8 FL (ref 81.4–97.8)
MONOCYTES # BLD AUTO: 0.43 K/UL (ref 0–0.85)
MONOCYTES NFR BLD AUTO: 6.6 % (ref 0–13.4)
NEUTROPHILS # BLD AUTO: 3.86 K/UL (ref 2–7.15)
NEUTROPHILS NFR BLD: 59.2 % (ref 44–72)
NITRITE UR QL STRIP.AUTO: NEGATIVE
NRBC # BLD AUTO: 0 K/UL
NRBC BLD AUTO-RTO: 0 /100 WBC
PH UR STRIP.AUTO: 6 [PH]
PLATELET # BLD AUTO: 253 K/UL (ref 164–446)
PMV BLD AUTO: 9.6 FL (ref 9–12.9)
POTASSIUM SERPL-SCNC: 3.8 MMOL/L (ref 3.6–5.5)
PROT SERPL-MCNC: 6.5 G/DL (ref 6–8.2)
PROT UR QL STRIP: NEGATIVE MG/DL
RBC # BLD AUTO: 4.02 M/UL (ref 4.2–5.4)
RBC UR QL AUTO: NEGATIVE
SODIUM SERPL-SCNC: 136 MMOL/L (ref 135–145)
SP GR UR: <=1.005
WBC # BLD AUTO: 6.5 K/UL (ref 4.8–10.8)

## 2017-06-28 PROCEDURE — 71010 DX-CHEST-PORTABLE (1 VIEW): CPT

## 2017-06-28 PROCEDURE — 83880 ASSAY OF NATRIURETIC PEPTIDE: CPT

## 2017-06-28 PROCEDURE — 80053 COMPREHEN METABOLIC PANEL: CPT

## 2017-06-28 PROCEDURE — 85025 COMPLETE CBC W/AUTO DIFF WBC: CPT

## 2017-06-28 PROCEDURE — 99284 EMERGENCY DEPT VISIT MOD MDM: CPT

## 2017-06-28 PROCEDURE — 81002 URINALYSIS NONAUTO W/O SCOPE: CPT

## 2017-06-28 ASSESSMENT — PAIN SCALES - GENERAL: PAINLEVEL_OUTOF10: 10

## 2017-06-28 ASSESSMENT — ENCOUNTER SYMPTOMS
SHORTNESS OF BREATH: 0
BACK PAIN: 0
NECK PAIN: 0

## 2017-06-28 NOTE — ED AVS SNAPSHOT
6/28/2017    Cortney MARTIN Romaine  [address] [place] [address]    Dear Cortney:    On license of [facility] wants to ensure your discharge home is safe and you or your loved ones have had all of your questions answered regarding your care after you leave the hospital.    Below is a list of resources and contact information should you have any questions regarding your hospital stay, follow-up instructions, or active medical symptoms.    Questions or Concerns Regarding… Contact   Medical Questions Related to Your Discharge  (7 days a week, 8am-5pm) Contact a Nurse Care Coordinator   [phone]   Medical Questions Not Related to Your Discharge  (24 hours a day / 7 days a week)  Contact the Nurse Health Line   [phone]    Medications or Discharge Instructions Refer to your discharge packet   or contact your [facility] Primary Care Provider   [phone]   Follow-up Appointment(s) Schedule your appointment via TNT Crowd   or contact Scheduling [phone]   Billing Review your statement via [employer]d  or contact Billing [phone]   Medical Records Review your records via TNT [employer]d   or contact Medical Records [phone]     You may receive a telephone call within two days of discharge. This call is to make certain you understand your discharge instructions and have the opportunity to have any questions answered. You can also easily access your medical information, test results and upcoming appointments via the TNT Crowd free online health management tool. You can learn more and sign up at [employer]/TNT Crowd. For assistance setting up your TNT Crowd account, please call [phone].    Once again, we want to ensure your discharge home is safe and that you have a clear understanding of any next steps in your care. If you have any questions or concerns, please do not hesitate to contact us, we are here for you. Thank you for choosing [facility] for your healthcare needs.    Sincerely,    Your [facility] Healthcare Team

## 2017-06-28 NOTE — ED AVS SNAPSHOT
Askablogr Access Code: [email or web address]  Expires: 7/6/2017  4:23 AM    Your email address is not on file at [facility].  Email Addresses are required for you to sign up for Askablogr, please contact [phone] to verify your personal information and to provide your email address prior to attempting to register for Askablogr.    Cortney Gavin  [address] [place] [address]    Askablogr  A secure, online tool to manage your health information     [facility]’s Askablogr® is a secure, online tool that connects you to your personalized health information from the privacy of your home -- day or night - making it very easy for you to manage your healthcare. Once the activation process is completed, you can even access your medical information using the Askablogr shubham, which is available for free in the Apple Shubham store or Google Play store.     To learn more about Askablogr, visit [email or web address]    There are two levels of access available (as shown below):   My Chart Features  [facility] Primary Care Doctor [facility]  Specialists [facility]  Urgent  Care Non-[facility] Primary Care Doctor   Email your healthcare team securely and privately 24/7 X X X    Manage appointments: schedule your next appointment; view details of past/upcoming appointments X      Request prescription refills. X      View recent personal medical records, including lab and immunizations X X X X   View health record, including health history, allergies, medications X X X X   Read reports about your outpatient visits, procedures, consult and ER notes X X X X   See your discharge summary, which is a recap of your hospital and/or ER visit that includes your diagnosis, lab results, and care plan X X  X     How to register for bCommunitiest:  Once your e-mail address has been verified, follow the following steps to sign up for bCommunitiest.     1. Go to  [email or web address]  2. Click on the Sign Up Now box, which takes you to the New Member Sign Up page. You will need  to provide the following information:  a. Enter your Allihub Access Code exactly as it appears at the top of this page. (You will not need to use this code after you’ve completed the sign-up process. If you do not sign up before the expiration date, you must request a new code.)   b. Enter your date of birth.   c. Enter your home email address.   d. Click Submit, and follow the next screen’s instructions.  3. Create a Allihub ID. This will be your Allihub login ID and cannot be changed, so think of one that is secure and easy to remember.  4. Create a Allihub password. You can change your password at any time.  5. Enter your Password Reset Question and Answer. This can be used at a later time if you forget your password.   6. Enter your e-mail address. This allows you to receive e-mail notifications when new information is available in Allihub.  7. Click Sign Up. You can now view your health information.    For assistance activating your Allihub account, call [phone]

## 2017-06-28 NOTE — ED AVS SNAPSHOT
Home Care Instructions                                                                                                                Cortney Gavin   MRN: [id number]    Department:  [facility], Emergency Dept   Date of Visit:  6/28/2017            [facility], Emergency Dept    [address] [address]    Phone:  [phone]      You were seen by     Bertin Mcdaniel M.D.      Your Diagnosis Was     Dependent edema     R60.9       Follow-up Information     1. Follow up with [facility].    Contact information    [address] [address] [phone]      Medication Information     Review all of your home medications and newly ordered medications with your primary doctor and/or pharmacist as soon as possible. Follow medication instructions as directed by your doctor and/or pharmacist.     Please keep your complete medication list with you and share with your physician. Update the information when medications are discontinued, doses are changed, or new medications (including over-the-counter products) are added; and carry medication information at all times in the event of emergency situations.               Medication List      ASK your doctor about these medications        Instructions    Morning Afternoon Evening Bedtime    buPROPion  MG Tb12   Commonly known as:  WELLBUTRIN-SR        Take 100 mg by mouth 2 times a day.   Dose:  100 mg                        carbamazepine 200 MG Tabs   Commonly known as:  TEGRETOL        Take 200 mg by mouth 2 Times a Day.   Dose:  200 mg                        gabapentin 300 MG Caps   Commonly known as:  NEURONTIN        Take 300 mg by mouth 3 times a day.   Dose:  300 mg                        ibuprofen 800 MG Tabs   Commonly known as:  MOTRIN        Take 800 mg by mouth every day.   Dose:  800 mg                        quetiapine 200 MG Tabs   Commonly known as:  SEROQUEL        Take 200 mg by  mouth Once.   Dose:  200 mg                        trazodone 150 MG Tabs   Commonly known as:  DESYREL        Take 150 mg by mouth every evening.   Dose:  150 mg                        ziprasidone 60 MG Caps   Commonly known as:  GEODON        Take 60 mg by mouth 2 Times a Day.   Dose:  60 mg                                Procedures and tests performed during your visit     BTYPE NATRIURETIC PEPTIDE    CBC WITH DIFFERENTIAL    COMP METABOLIC PANEL    DX-CHEST-PORTABLE (1 VIEW)    ESTIMATED GFR    POC UA        Discharge Instructions       Peripheral Edema  Use support hose. Contact your doctor for follow-up  You have swelling in your legs (peripheral edema). This swelling is due to excess accumulation of salt and water in your body. Edema may be a sign of heart, kidney or liver disease, or a side effect of a medication. It may also be due to problems in the leg veins. Elevating your legs and using special support stockings may be very helpful, if the cause of the swelling is due to poor venous circulation. Avoid long periods of standing, whatever the cause.  Treatment of edema depends on identifying the cause. Chips, pretzels, pickles and other salty foods should be avoided. Restricting salt in your diet is almost always needed. Water pills (diuretics) are often used to remove the excess salt and water from your body via urine. These medicines prevent the kidney from reabsorbing sodium. This increases urine flow.  Diuretic treatment may also result in lowering of potassium levels in your body. Potassium supplements may be needed if you have to use diuretics daily. Daily weights can help you keep track of your progress in clearing your edema. You should call your caregiver for follow up care as recommended.  SEEK IMMEDIATE MEDICAL CARE IF:   · You have increased swelling, pain, redness, or heat in your legs.  · You develop shortness of breath, especially when lying down.  · You develop chest or abdominal pain,  weakness, or fainting.  · You have a fever.     This information is not intended to replace advice given to you by your health care provider. Make sure you discuss any questions you have with your health care provider.     Document Released: 01/25/2006 Document Revised: 03/11/2013 Document Reviewed: 01/05/2011  [employer] Interactive Patient Education ©2016 [employer]            Patient Information     Patient Information    Following emergency treatment: all patient requiring follow-up care must return either to a private physician or a clinic if your condition worsens before you are able to obtain further medical attention, please return to the emergency room.     Billing Information    At [facility], we work to make the billing process streamlined for our patients.  Our Representatives are here to answer any questions you may have regarding your hospital bill.  If you have insurance coverage and have supplied your insurance information to us, we will submit a claim to your insurer on your behalf.  Should you have any questions regarding your bill, we can be reached online or by phone as follows:  Online: You are able pay your bills online or live chat with our representatives about any billing questions you may have. We are here to help Monday - Friday from 8:00am to 7:30pm and 9:00am - 12:00pm on Saturdays.  Please visit [email or web address]  for more information.   Phone:  [phone] or [phone]    Please note that your emergency physician, surgeon, pathologist, radiologist, anesthesiologist, and other specialists are not employed by [facility] and will therefore bill separately for their services.  Please contact them directly for any questions concerning their bills at the numbers below:     Emergency Physician Services:  [phone]  [facility]:  [phone]  [facility]:  [phone]  [facility]:   [phone]    1. Your final bill may vary from the amount quoted upon discharge if all procedures are not complete at that time, or if your doctor has additional procedures of which we are not aware. You will receive an additional bill if you return to the Emergency Department at [facility] for suture removal regardless of the facility of which the sutures were placed.     2. Please arrange for settlement of this account at the emergency registration.    3. All self-pay accounts are due in full at the time of treatment.  If you are unable to meet this obligation then payment is expected within 4-5 days.     4. If you have had radiology studies (CT, X-ray, Ultrasound, MRI), you have received a preliminary result during your emergency department visit. Please contact the radiology department [phone] to receive a copy of your final result. Please discuss the Final result with your primary physician or with the follow up physician provided.     Crisis Hotline:  [place] Crisis Hotline:  5-297-PQCCOWZ or 1-513.772.6577  [place] Crisis Hotline:    1-838.578.2171 or 430-724-5502         ED Discharge Follow Up Questions    1. In order to provide you with very good care, we would like to follow up with a phone call in the next few days.  May we have your permission to contact you?     YES /  NO    2. What is the best phone number to call you? (       )_____-__________    3. What is the best time to call you?      Morning  /  Afternoon  /  Evening                   Patient Signature:  ____________________________________________________________    Date:  ____________________________________________________________

## 2017-06-29 NOTE — ED NOTES
"Ambulatory to room from triage with steady slow gait, states \"tighteness\" pain in lower legs. Some redness and edema noted. Chart up for ERP  "

## 2017-06-29 NOTE — ED NOTES
Pt to restroom in wheelchair. Apply to walk to bathroom. Steady but slow gait. Pt back to bed. Urine analyzed.

## 2017-06-29 NOTE — ED NOTES
Xray at bedside. Pt instructed on need for urine sample. Pt refuses to move or stand. Pt unwilling to give sample at this time.

## 2017-06-29 NOTE — ED NOTES
Pt c/o bilat ankle swelling, pain, difficulty walking; pt amb into triage w/o difficulty. Pt state ankle swelling x 1 week.

## 2017-06-29 NOTE — ED PROVIDER NOTES
"ED Provider Note    Scribed for Bertin Mcdaniel M.D. by Colleen Bonilla. 6/28/2017, 7:05 PM.    Primary care provider: Pcp Pt States None  Means of arrival: Ambulance   History obtained from: Patient   History limited by: None     CHIEF COMPLAINT  Chief Complaint   Patient presents with   • Ankle Swelling       HPI  Cortney Gavin is a 46 y.o. female who presents to the Emergency Department due to severely worsening bilateral lower extremity edema onset one week ago. She reports \"sharp\" pains to the bilateral lower extremities described as a \"tightness.\" Her pain and edema is worst at the bilateral ankles. Patient's pain is exacerbated on ambulation. She denies shortness of breath, chest pain, neck pain, back pain, or dysuria.     REVIEW OF SYSTEMS  Review of Systems   Respiratory: Negative for shortness of breath.    Cardiovascular: Negative for chest pain.   Genitourinary: Negative for dysuria.   Musculoskeletal: Negative for back pain and neck pain.        Positive bilateral lower extremity pain and edema.    All other systems reviewed and are negative.  C     PAST MEDICAL HISTORY   has a past medical history of Chronic back pain and Schizophrenia (CMS-HCC).    SURGICAL HISTORY  patient denies any surgical history    SOCIAL HISTORY  Social History   Substance Use Topics   • Smoking status: Never Smoker    • Smokeless tobacco: None   • Alcohol Use: No      History   Drug Use   • Yes   • Special: Inhaled     Comment: thc       FAMILY HISTORY  History reviewed. No pertinent family history.    CURRENT MEDICATIONS  Home Medications     Reviewed by Jorge Pastor R.N. (Registered Nurse) on 06/28/17 at 1901  Med List Status: Not Addressed    Medication Last Dose Status    buPROPion SR (WELLBUTRIN-SR) 100 MG TABLET SR 12 HR  Active    carbamazepine (TEGRETOL) 200 MG Tab  Active    gabapentin (NEURONTIN) 300 MG Cap  Active    ibuprofen (MOTRIN) 800 MG Tab  Active    quetiapine (SEROQUEL) 200 MG Tab  Active    trazodone " "(DESYREL) 150 MG Tab  Active    ziprasidone (GEODON) 60 MG Cap  Active                ALLERGIES  Allergies   Allergen Reactions   • Norco [Apap-Fd&C Yellow #10 [place]-Hydrocodone] Itching       PHYSICAL EXAM  VITAL SIGNS: /88 mmHg  Pulse 81  Temp(Src) 36.4 °C (97.6 °F)  Resp 16  Ht 1.753 m (5' 9\")  Wt 129 kg (284 lb 6.3 oz)  BMI 41.98 kg/m2  SpO2 95%    Constitutional:   No acute distress  HENT:  Moist mucous membranes  Eyes:  No conjunctivitis or icterus  Neck:  trachea is midline, no palpable thyroid  Lymphatic:  No cervical lymphadenopathy  Cardiovascular:  Regular rate and rhythm, no murmurs  Thorax & Lungs:  Normal breath sounds, no rhonchi  Abdomen:  Soft, Non-tender  Skin:.  no rash  Back:  Non-tender, no CVA tenderness  Extremities:   Edema to bilateral lower extremities.   Vascular:  symmetric radial pulse  Neurologic:  Normal gross motor    LABS  Labs Reviewed   CBC WITH DIFFERENTIAL - Abnormal; Notable for the following:     RBC 4.02 (*)     Hemoglobin 11.9 (*)     Hematocrit 36.5 (*)     MCHC 32.6 (*)     All other components within normal limits   COMP METABOLIC PANEL - Abnormal; Notable for the following:     Calcium 8.4 (*)     All other components within normal limits   ESTIMATED GFR   BTYPE NATRIURETIC PEPTIDE   POC URINALYSIS   All labs reviewed by me.    RADIOLOGY  DX-CHEST-PORTABLE (1 VIEW)   Final Result      Cardiomegaly. No evidence of pulmonary edema.      The radiologist's interpretation of all radiological studies have been reviewed by me.    COURSE & MEDICAL DECISION MAKING  Pertinent Labs & Imaging studies reviewed. (See chart for details)    7:05 PM - Patient seen and examined at bedside. Ordered chest x-ray, POC urinalysis, CBC with differential, CMP, and BNP to evaluate her symptoms. The differential diagnoses include but are not limited to: rule out heart failure, liver failure, kidney failure.      Medical Decision Making:  The patient has dependent edema. She has " symmetric edema and there is no palpable cords. Workup for congestive heart failure nephrosis and cirrhosis are negative. At this point I think she needs compression hose and follow up with her physician        FINAL IMPRESSION  1. Dependent edema          Colleen RIVAS (Scribe), am scribing for, and in the presence of, Bertin Mcdaniel M.D..    Electronically signed by: Colleen Bonilla (Scribe), 6/28/2017    IBertin M.D. personally performed the services described in this documentation, as scribed by Colleen Bonilla in my presence, and it is both accurate and complete.    The note accurately reflects work and decisions made by me.  Bertin Mcdaniel  6/28/2017  9:41 PM

## 2017-06-29 NOTE — DISCHARGE INSTRUCTIONS
Peripheral Edema  Use support hose. Contact your doctor for follow-up  You have swelling in your legs (peripheral edema). This swelling is due to excess accumulation of salt and water in your body. Edema may be a sign of heart, kidney or liver disease, or a side effect of a medication. It may also be due to problems in the leg veins. Elevating your legs and using special support stockings may be very helpful, if the cause of the swelling is due to poor venous circulation. Avoid long periods of standing, whatever the cause.  Treatment of edema depends on identifying the cause. Chips, pretzels, pickles and other salty foods should be avoided. Restricting salt in your diet is almost always needed. Water pills (diuretics) are often used to remove the excess salt and water from your body via urine. These medicines prevent the kidney from reabsorbing sodium. This increases urine flow.  Diuretic treatment may also result in lowering of potassium levels in your body. Potassium supplements may be needed if you have to use diuretics daily. Daily weights can help you keep track of your progress in clearing your edema. You should call your caregiver for follow up care as recommended.  SEEK IMMEDIATE MEDICAL CARE IF:   · You have increased swelling, pain, redness, or heat in your legs.  · You develop shortness of breath, especially when lying down.  · You develop chest or abdominal pain, weakness, or fainting.  · You have a fever.     This information is not intended to replace advice given to you by your health care provider. Make sure you discuss any questions you have with your health care provider.     Document Released: 01/25/2006 Document Revised: 03/11/2013 Document Reviewed: 01/05/2011  [employer] Interactive Patient Education ©2016 [employer]

## 2017-06-29 NOTE — ED NOTES
.Discharge instructions given to pt. Pt verbalized understanding. Questions answered. Ambulatory to home. Pt to lobby in wheel chair.

## 2017-07-19 ENCOUNTER — OFFICE VISIT (OUTPATIENT)
Dept: MEDICAL GROUP | Facility: PHYSICIAN GROUP | Age: 47
End: 2017-07-19
Payer: MEDICARE

## 2017-07-19 VITALS
TEMPERATURE: 99 F | OXYGEN SATURATION: 95 % | HEART RATE: 66 BPM | WEIGHT: 260 LBS | SYSTOLIC BLOOD PRESSURE: 122 MMHG | DIASTOLIC BLOOD PRESSURE: 72 MMHG | HEIGHT: 69 IN | RESPIRATION RATE: 16 BRPM | BODY MASS INDEX: 38.51 KG/M2

## 2017-07-19 DIAGNOSIS — R41.3 MEMORY PROBLEM: ICD-10-CM

## 2017-07-19 DIAGNOSIS — Z00.00 ROUTINE GENERAL MEDICAL EXAMINATION AT A HEALTH CARE FACILITY: ICD-10-CM

## 2017-07-19 DIAGNOSIS — M54.6 CHRONIC BILATERAL THORACIC BACK PAIN: ICD-10-CM

## 2017-07-19 DIAGNOSIS — G89.29 CHRONIC BILATERAL THORACIC BACK PAIN: ICD-10-CM

## 2017-07-19 DIAGNOSIS — F31.9 BIPOLAR 1 DISORDER (HCC): ICD-10-CM

## 2017-07-19 DIAGNOSIS — F51.01 PRIMARY INSOMNIA: ICD-10-CM

## 2017-07-19 DIAGNOSIS — Z12.31 ENCOUNTER FOR SCREENING MAMMOGRAM FOR BREAST CANCER: ICD-10-CM

## 2017-07-19 DIAGNOSIS — E66.9 OBESITY (BMI 30-39.9): ICD-10-CM

## 2017-07-19 PROCEDURE — 99204 OFFICE O/P NEW MOD 45 MIN: CPT | Performed by: INTERNAL MEDICINE

## 2017-07-19 ASSESSMENT — PATIENT HEALTH QUESTIONNAIRE - PHQ9
SUM OF ALL RESPONSES TO PHQ QUESTIONS 1-9: 20
5. POOR APPETITE OR OVEREATING: 3 - NEARLY EVERY DAY
CLINICAL INTERPRETATION OF PHQ2 SCORE: 3
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
## 2017-07-19 NOTE — MR AVS SNAPSHOT
"        Cortney Gavin   2017 2:20 PM   Office Visit   MRN: [id number]    Department:  [facility]   Dept Phone:  [phone]    Description:  Female : 1970   Provider:  Rubi Ruby M.D.           Reason for Visit     Establish Care Pt has forms for access to fill out    Referral Needed Px management     Orders Needed Mammogram and labs      Allergies as of 2017     Allergen Noted Reactions    Norco [Apap-Fd&C Yellow #10 [place]-Hydrocodone] 2017   Itching      You were diagnosed with     Obesity (BMI 30-39.9)   [092585]       Chronic bilateral thoracic back pain   [0410056]       Bipolar 1 disorder (CMS-HCC)   [333559]       Primary insomnia   [988281]       Memory problem   [515565]       Encounter for screening mammogram for breast cancer   [9279510]       Routine general medical examination at a health care facility   [V70.0.ICD-9-CM]         Vital Signs     Blood Pressure Pulse Temperature Respirations Height Weight    122/72 mmHg 66 37.2 °C (99 °F) 16 1.74 m (5' 8.5\") 117.935 kg (260 lb)    Body Mass Index Oxygen Saturation Last Menstrual Period Breastfeeding? Smoking Status       38.95 kg/m2 95% 2017 No Never Smoker        Basic Information     Date Of Birth Sex Race Ethnicity Preferred Language    1970 Female White Non- English      Your appointments     Aug 09, 2017  2:00 PM   Established Patient with Rubi Ruby M.D.   [facility] (--)    [address]  [address]  [facility] [id number] [phone]           You will be receiving a confirmation call a few days before your appointment from our automated call confirmation system.              Problem List              ICD-10-CM Priority Class Noted - Resolved    Obesity (BMI 30-39.9) E66.9   2017 - Present    Chronic bilateral thoracic back pain M54.6, G89.29   2017 - Present    Bipolar 1 disorder (CMS-HCC) F31.9   2017 - Present    Primary insomnia F51.01   2017 - Present   " Memory problem R41.3   7/19/2017 - Present      Health Maintenance        Date Due Completion Dates    IMM DTaP/Tdap/Td Vaccine (1 - Tdap) 7/6/1990 ---    PAP SMEAR 7/6/1992 ---    MAMMOGRAM 7/6/2011 ---    IMM INFLUENZA (1) 9/1/2017 10/15/2016            Current Immunizations     Influenza Vaccine Quad Inj (Preserved) 10/15/2016      Below and/or attached are the medications your provider expects you to take. Review all of your home medications and newly ordered medications with your provider and/or pharmacist. Follow medication instructions as directed by your provider and/or pharmacist. Please keep your medication list with you and share with your provider. Update the information when medications are discontinued, doses are changed, or new medications (including over-the-counter products) are added; and carry medication information at all times in the event of emergency situations     Allergies:  NORCO - Itching               Medications  Valid as of: July 19, 2017 -  3:12 PM    Generic Name Brand Name Tablet Size Instructions for use    BuPROPion HCl (TABLET SR 12 HR) WELLBUTRIN- MG Take 100 mg by mouth 2 times a day.        CarBAMazepine (Tab) TEGRETOL 200 MG Take 200 mg by mouth 2 Times a Day.        Gabapentin (Cap) NEURONTIN 300 MG Take 300 mg by mouth 3 times a day.        Ibuprofen (Tab) MOTRIN 800 MG Take 800 mg by mouth every day.        TraZODone HCl (Tab) DESYREL 150 MG Take 150 mg by mouth every evening.        Ziprasidone HCl (Cap) GEODON 60 MG Take 60 mg by mouth 2 Times a Day.        .                 Medicines prescribed today were sent to:     None      Medication refill instructions:       If your prescription bottle indicates you have medication refills left, it is not necessary to call your provider’s office. Please contact your pharmacy and they will refill your medication.    If your prescription bottle indicates you do not have any refills left, you may request refills at any time  through one of the following ways: The online [employer]iSchoolt system (except Urgent Care), by calling your provider’s office, or by asking your pharmacy to contact your provider’s office with a refill request. Medication refills are processed only during regular business hours and may not be available until the next business day. Your provider may request additional information or to have a follow-up visit with you prior to refilling your medication.   *Please Note: Medication refills are assigned a new Rx number when refilled electronically. Your pharmacy may indicate that no refills were authorized even though a new prescription for the same medication is available at the pharmacy. Please request the medicine by name with the pharmacy before contacting your provider for a refill.        Your To Do List     Future Labs/Procedures Complete By Expires    LIPID PROFILE  As directed 7/20/2018    [facility] PAIN MANAGEMENT SCREEN  As directed 7/19/2018    Comments:    Current Meds (name, sig, last dose):   Current outpatient prescriptions:   •  trazodone, 150 mg, Oral, Nightly  •  ziprasidone, 60 mg, Oral, BID  •  buPROPion SR, 100 mg, Oral, BID  •  gabapentin, 300 mg, Oral, TID  •  ibuprofen, 800 mg, Oral, DAILY  •  carbamazepine, 200 mg, Oral, BID         MyChart Status: Patient Declined

## 2017-07-19 NOTE — Clinical Note
[facility]  Rubi Ruby M.D.  [address]  [place] [address]  Fax: [phone]   Authorization for Release/Disclosure of   Protected Health Information   Name: KYLE KWON : 1970 SSN: [id number]   Address: [address]  [facility] [address] Phone:    [phone] (home)    I authorize the entity listed below to release/disclose the PHI below to:   [facility]/Rubi Ruby M.D. and Rubi Ruby M.D.   Provider or Entity Name:  [facility]    Address   City, State, Zip   Phone:      Fax:     Reason for request: continuity of care   Information to be released:    [  ] LAST COLONOSCOPY,  including any PATH REPORT and follow-up  [  ] LAST FIT/COLOGUARD RESULT [  ] LAST DEXA  [X  ] LAST MAMMOGRAM  [X  ] LAST PAP  [ X ] LAST LABS [  ] RETINA EXAM REPORT  [X  ] IMMUNIZATION RECORDS  [  ] Release all info      [  ] Check here and initial the line next to each item to release ALL health information INCLUDING  _____ Care and treatment for drug and / or alcohol abuse  _____ HIV testing, infection status, or AIDS  _____ Genetic Testing    DATES OF SERVICE OR TIME PERIOD TO BE DISCLOSED: _____________  I understand and acknowledge that:  * This Authorization may be revoked at any time by you in writing, except if your health information has already been used or disclosed.  * Your health information that will be used or disclosed as a result of you signing this authorization could be re-disclosed by the recipient. If this occurs, your re-disclosed health information may no longer be protected by State or Federal laws.  * You may refuse to sign this Authorization. Your refusal will not affect your ability to obtain treatment.  * This Authorization becomes effective upon signing and will  on (date) __________.      If no date is indicated, this Authorization will  one (1) year from the signature date.    Name: Kyle Kwon    Signature:   Date:     2017       PLEASE FAX  REQUESTED RECORDS BACK TO: [phone]

## 2017-07-19 NOTE — Clinical Note
July 19, 2017        Cortney Gavin        Current outpatient prescriptions:   •  trazodone (DESYREL) 150 MG Tab, Take 150 mg by mouth every evening., Disp: , Rfl:   •  ziprasidone (GEODON) 60 MG Cap, Take 60 mg by mouth 2 Times a Day., Disp: , Rfl:   •  buPROPion SR (WELLBUTRIN-SR) 100 MG TABLET SR 12 HR, Take 100 mg by mouth 2 times a day., Disp: , Rfl:   •  gabapentin (NEURONTIN) 300 MG Cap, Take 300 mg by mouth 3 times a day., Disp: , Rfl:   •  ibuprofen (MOTRIN) 800 MG Tab, Take 800 mg by mouth every day., Disp: , Rfl:   •  carbamazepine (TEGRETOL) 200 MG Tab, Take 200 mg by mouth 2 Times a Day., Disp: , Rfl:           Kathleen Armstrong

## 2017-07-21 NOTE — ASSESSMENT & PLAN NOTE
Due to excess calories. Counseling for a small portion, balanced diet, exercising 3-5 times per week.

## 2017-07-22 LAB — HIV 1+2 AB+HIV1 P24 AG SERPL QL IA: NON REACTIVE

## 2017-07-22 NOTE — ASSESSMENT & PLAN NOTE
She describes more cognitive problem, and short term memory problems. She has trouble following instructions. She never gets lost she is able to ask for help. She forget to press what station to stop when she is on public transportation. She reports that she sleep wells. She is asking for letter for ride access. Papers filled out.

## 2017-07-22 NOTE — ASSESSMENT & PLAN NOTE
Follows with psychologist, and psychiatrist. Currently on Tegretol, trazodone, wellbutrin and Geodon. She reports maniacal episodes on the past. She refuses suicidal thoughts
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
## 2017-07-25 ENCOUNTER — TELEPHONE (OUTPATIENT)
Dept: MEDICAL GROUP | Facility: PHYSICIAN GROUP | Age: 47
End: 2017-07-25

## 2017-07-25 NOTE — TELEPHONE ENCOUNTER
----- Message from Rubi Ruby M.D. sent at 7/25/2017  7:04 AM PDT -----  Please can you let patient know that HIV test is negative  Rubi Ruby M.D.

## 2017-07-26 ENCOUNTER — OFFICE VISIT (OUTPATIENT)
Dept: MEDICAL GROUP | Facility: PHYSICIAN GROUP | Age: 47
End: 2017-07-26
Payer: MEDICARE

## 2017-07-26 VITALS
RESPIRATION RATE: 16 BRPM | TEMPERATURE: 98.6 F | OXYGEN SATURATION: 96 % | HEIGHT: 68 IN | DIASTOLIC BLOOD PRESSURE: 72 MMHG | WEIGHT: 256 LBS | SYSTOLIC BLOOD PRESSURE: 124 MMHG | BODY MASS INDEX: 38.8 KG/M2 | HEART RATE: 68 BPM

## 2017-07-26 DIAGNOSIS — G89.29 CHRONIC BILATERAL THORACIC BACK PAIN: ICD-10-CM

## 2017-07-26 DIAGNOSIS — D64.9 NORMOCYTIC ANEMIA: ICD-10-CM

## 2017-07-26 DIAGNOSIS — G89.29 CHRONIC PAIN OF BOTH KNEES: ICD-10-CM

## 2017-07-26 DIAGNOSIS — M25.551 PAIN OF RIGHT HIP JOINT: ICD-10-CM

## 2017-07-26 DIAGNOSIS — M54.6 CHRONIC BILATERAL THORACIC BACK PAIN: ICD-10-CM

## 2017-07-26 DIAGNOSIS — M25.561 CHRONIC PAIN OF BOTH KNEES: ICD-10-CM

## 2017-07-26 DIAGNOSIS — E66.9 OBESITY (BMI 30-39.9): ICD-10-CM

## 2017-07-26 DIAGNOSIS — N95.9 POST MENOPAUSAL PROBLEMS: ICD-10-CM

## 2017-07-26 DIAGNOSIS — M25.562 CHRONIC PAIN OF BOTH KNEES: ICD-10-CM

## 2017-07-26 PROCEDURE — 99214 OFFICE O/P EST MOD 30 MIN: CPT | Performed by: INTERNAL MEDICINE

## 2017-07-26 NOTE — MR AVS SNAPSHOT
"        Cortney Gavin   2017 2:20 PM   Office Visit   MRN: [id number]    Department:  [facility]   Dept Phone:  [phone]    Description:  Female : 1970   Provider:  Rubi Ruby M.D.           Reason for Visit     Results Lab review     Referral Needed Orthopedics Knee and Hip ankle     Ear Pain Rt ear worse then left      Allergies as of 2017     Allergen Noted Reactions    Norco [Apap-Fd&C Yellow #10 Al [place]-Hydrocodone] 2017   Itching      You were diagnosed with     Pain of right hip joint   [9048210]       Chronic bilateral thoracic back pain   [4965248]       Chronic pain of both knees   [5806413]       Obesity (BMI 30-39.9)   [532862]         Vital Signs     Blood Pressure Pulse Temperature Respirations Height Weight    124/72 mmHg 68 37 °C (98.6 °F) 16 1.727 m (5' 7.99\") 116.121 kg (256 lb)    Body Mass Index Oxygen Saturation Last Menstrual Period Breastfeeding? Smoking Status       38.93 kg/m2 96% 2017 No Never Smoker        Basic Information     Date Of Birth Sex Race Ethnicity Preferred Language    1970 Female White Non- English      Your appointments     Aug 02, 2017  1:30 PM   MA SCRN10 with [facility] MG 3   [facility] ([address])    [address]  [facility] [address] [phone]           No deodorant, powder, perfume or lotion under the arm or breast area.            Aug 10, 2017  9:40 AM   Established Patient with Rubi Ruby M.D.   [facility] (--)    [address] [facility]  [address]  [facility] [id number] [phone]           You will be receiving a confirmation call a few days before your appointment from our automated call confirmation system.              Problem List              ICD-10-CM Priority Class Noted - Resolved    Obesity (BMI 30-39.9) E66.9   2017 - Present    Chronic bilateral thoracic back pain M54.6, G89.29   2017 - Present    Bipolar 1 disorder (CMS-HCC) F31.9   2017 - " Present    Primary insomnia F51.01   7/19/2017 - Present    Memory problem R41.3   7/19/2017 - Present    Pain of right hip joint M25.551   7/26/2017 - Present    Chronic pain of both knees M25.561, M25.562, G89.29   7/26/2017 - Present      Health Maintenance        Date Due Completion Dates    IMM DTaP/Tdap/Td Vaccine (1 - Tdap) 7/6/1990 ---    PAP SMEAR 7/6/1992 ---    MAMMOGRAM 7/6/2011 ---    IMM INFLUENZA (1) 9/1/2017 10/15/2016            Current Immunizations     Influenza Vaccine Quad Inj (Preserved) 10/15/2016      Below and/or attached are the medications your provider expects you to take. Review all of your home medications and newly ordered medications with your provider and/or pharmacist. Follow medication instructions as directed by your provider and/or pharmacist. Please keep your medication list with you and share with your provider. Update the information when medications are discontinued, doses are changed, or new medications (including over-the-counter products) are added; and carry medication information at all times in the event of emergency situations     Allergies:  NORCO - Itching               Medications  Valid as of: July 26, 2017 -  3:02 PM    Generic Name Brand Name Tablet Size Instructions for use    BuPROPion HCl (TABLET SR 12 HR) WELLBUTRIN- MG Take 100 mg by mouth 2 times a day.        CarBAMazepine (Tab) TEGRETOL 200 MG Take 200 mg by mouth 2 Times a Day.        Gabapentin (Cap) NEURONTIN 300 MG Take 300 mg by mouth 3 times a day.        Ibuprofen (Tab) MOTRIN 800 MG Take 800 mg by mouth every day.        TraZODone HCl (Tab) DESYREL 150 MG Take 150 mg by mouth every evening.        Ziprasidone HCl (Cap) GEODON 60 MG Take 60 mg by mouth 2 Times a Day.        .                 Medicines prescribed today were sent to:     None      Medication refill instructions:       If your prescription bottle indicates you have medication refills left, it is not necessary to call your  provider’s office. Please contact your pharmacy and they will refill your medication.    If your prescription bottle indicates you do not have any refills left, you may request refills at any time through one of the following ways: The online [employer] system (except Urgent Care), by calling your provider’s office, or by asking your pharmacy to contact your provider’s office with a refill request. Medication refills are processed only during regular business hours and may not be available until the next business day. Your provider may request additional information or to have a follow-up visit with you prior to refilling your medication.   *Please Note: Medication refills are assigned a new Rx number when refilled electronically. Your pharmacy may indicate that no refills were authorized even though a new prescription for the same medication is available at the pharmacy. Please request the medicine by name with the pharmacy before contacting your provider for a refill.        Your To Do List     Future Labs/Procedures Complete By Expires    DX-HIP-COMPLETE - UNILATERAL 2+ RIGHT  As directed 7/26/2018    DX-KNEE COMPLETE 4+ LEFT  As directed 1/26/2018    DX-KNEE COMPLETE 4+ RIGHT  As directed 1/26/2018         [employer] Status: Patient Declined

## 2017-07-26 NOTE — ASSESSMENT & PLAN NOTE
Chronic. Take advil. She tells me that knee pain, ankle pain is detrimental for her. She cannot walk because of pain. No trauma, constant pain, sharp pain. Worse on walking.

## 2017-07-26 NOTE — ASSESSMENT & PLAN NOTE
She tells me that she has chronic back pain all her life. She tells me that she had MRI in [place]. She was told she needs surgery, but she had refused it. No trauma, she is morbid obese. Today she point a tender point right low rib, no radiation, tender on palpation. Seems more tender point. She describes constant, sharp pain, no radiation. No specific trigger, nothing to seems to help. No association with breathing

## 2017-07-26 NOTE — ASSESSMENT & PLAN NOTE
Slight, she tells me that she was during her cycle when she had last lab work done. She has heavy cycle.

## 2017-07-26 NOTE — ASSESSMENT & PLAN NOTE
She reports hip pain, denies previous trauma. She reports constant, sharp pain. Stubbing. Does not matter walking or resting. She reports that started 3-4 weeks ago.   She reports also hand pain. Examination is difficult due to morbid obesity. She has tenderness even on light palpation. She describes pain as 8/10. She smiles, fluently communicating. It is not real correlation of the degree of pain and physical examination. Pt knowledge that she has low tolerance to pain. She states that she cannot take pain. Referral for pain specialist in place. She will make an apt  She is asking for walker. She can walk fine, no limping. I will discuss with her next apt for physical therapy. U-tox negative. I advised pt cognitive therapy to work with pain tolerance, advil or tylenol prn. After imaging will consider ortho referral.

## 2017-07-26 NOTE — ASSESSMENT & PLAN NOTE
She has a questions about hormonal therapy. She has hot flashes and she is asking for premarin. I explained side effects and encourage climate control. Pt agreed with the plan. She will think about hormonal therapy. She will need pap smear or GYN referral. She tells me that she had pap smear done recently, will try to get records.

## 2017-07-26 NOTE — PROGRESS NOTES
Subjective:   Cortney Gavin is a 47 y.o. female here today for acute pain cough, back pain, knee pain, obesity, postmenopausal problems     She is very tangetial, poor historian. She has multiple tender point, multiple questions.     Pain of right hip joint  She reports hip pain, denies previous trauma. She reports constant, sharp pain. Stubbing. Does not matter walking or resting. She reports that started 3-4 weeks ago.   She reports also hand pain. Examination is difficult due to morbid obesity. She has tenderness even on light palpation. She describes pain as 8/10. She smiles, fluently communicating. It is not real correlation of the degree of pain and physical examination. Pt knowledge that she has low tolerance to pain. She states that she cannot take pain. Referral for pain specialist in place. She will make an apt  She is asking for walker. She can walk fine, no limping. I will discuss with her next apt for physical therapy. U-tox negative. I advised pt cognitive therapy to work with pain tolerance, advil or tylenol prn. After imaging will consider ortho referral.     Chronic bilateral thoracic back pain  She tells me that she has chronic back pain all her life. She tells me that she had MRI in [place]. She was told she needs surgery, but she had refused it. No trauma, she is morbid obese. Today she point a tender point right low rib, no radiation, tender on palpation. Seems more tender point. She describes constant, sharp pain, no radiation. No specific trigger, nothing to seems to help. No association with breathing      Chronic pain of both knees  Chronic. Take advil. She tells me that knee pain, ankle pain is detrimental for her. She cannot walk because of pain. No trauma, constant pain, sharp pain. Worse on walking.     Obesity (BMI 30-39.9)  Counseling for a small portion, balanced diet, exercising 3-5 times per week.      Post menopausal problems  She has a questions about hormonal therapy. She has hot  flashes and she is asking for premarin. I explained side effects and encourage climate control. Pt agreed with the plan. She will think about hormonal therapy. She will need pap smear or GYN referral. She tells me that she had pap smear done recently, will try to get records.     Normocytic anemia  Slight, she tells me that she was during her cycle when she had last lab work done. She has heavy cycle.         Current medicines (including changes today)  Current Outpatient Prescriptions   Medication Sig Dispense Refill   • trazodone (DESYREL) 150 MG Tab Take 150 mg by mouth every evening.     • ziprasidone (GEODON) 60 MG Cap Take 60 mg by mouth 2 Times a Day.     • buPROPion SR (WELLBUTRIN-SR) 100 MG TABLET SR 12 HR Take 100 mg by mouth 2 times a day.     • gabapentin (NEURONTIN) 300 MG Cap Take 300 mg by mouth 3 times a day.     • ibuprofen (MOTRIN) 800 MG Tab Take 800 mg by mouth every day.     • carbamazepine (TEGRETOL) 200 MG Tab Take 200 mg by mouth 2 Times a Day.       No current facility-administered medications for this visit.     She  has a past medical history of Chronic back pain; Schizophrenia (CMS-HCC); Bipolar 1 disorder (CMS-HCC); and Back pain. She also has no past medical history of Diabetes (CMS-HCC).    Current Outpatient Prescriptions   Medication Sig Dispense Refill   • trazodone (DESYREL) 150 MG Tab Take 150 mg by mouth every evening.     • ziprasidone (GEODON) 60 MG Cap Take 60 mg by mouth 2 Times a Day.     • buPROPion SR (WELLBUTRIN-SR) 100 MG TABLET SR 12 HR Take 100 mg by mouth 2 times a day.     • gabapentin (NEURONTIN) 300 MG Cap Take 300 mg by mouth 3 times a day.     • ibuprofen (MOTRIN) 800 MG Tab Take 800 mg by mouth every day.     • carbamazepine (TEGRETOL) 200 MG Tab Take 200 mg by mouth 2 Times a Day.       No current facility-administered medications for this visit.       Allergies as of 07/26/2017 - Bipin as Reviewed 07/26/2017   Allergen Reaction Noted   • Norco [apap-fd&c yellow  "#10 [place]-hydrocodone] Itching 06/07/2017       Social History     Social History   • Marital Status: Single     Spouse Name: N/A   • Number of Children: N/A   • Years of Education: N/A     Occupational History   • Not on file.     Social History Main Topics   • Smoking status: Never Smoker    • Smokeless tobacco: Never Used   • Alcohol Use: No   • Drug Use: Yes     Special: Inhaled      Comment: thc   • Sexual Activity: No     Other Topics Concern   • Not on file     Social History Narrative        Family History   Problem Relation Age of Onset   • Heart Disease Mother      a fib   • Heart Disease Maternal Aunt      chf   • Cancer Maternal Grandfather      lung cancer, smoker   • Diabetes Neg Hx        Past Surgical History   Procedure Laterality Date   • Ankle arthroscopy     • Knee arthroplasty total       4 surgeries, bilteral        ROS   All systems reviewed are negative except for HPI         Objective:     Blood pressure 124/72, pulse 68, temperature 37 °C (98.6 °F), resp. rate 16, height 1.727 m (5' 7.99\"), weight 116.121 kg (256 lb), last menstrual period 07/17/2017, SpO2 96 %, not currently breastfeeding. Body mass index is 38.93 kg/(m^2).   Physical Exam:  Constitutional: Alert, no distress, morbid obese   Skin: Warm, dry, good turgor, no rashes in visible areas.  Eye: Equal, round and reactive, conjunctiva clear, lids normal.  ENMT: Lips without lesions, good dentition, oropharynx clear.  Neck: Trachea midline, no masses, no thyromegaly. No cervical or supraclavicular lymphadenopathy  Respiratory: Unlabored respiratory effort, lungs clear to auscultation, no wheezes, no ronchi.  Cardiovascular: Normal S1, S2, no murmur, no edema.  Abdomen: Soft, non-tender, no masses, no hepatosplenomegaly.  Psych: Alert and oriented x3, normal affect and mood.  SPINE: No significant spinal curvature on forward bend. Mild tenderness in paraspinous muscles low right rib pain. Strength 5/5 proximal and distal LE.  No " pain with stress of SI. Full hip ROM. Poor hamstring flexibility. No symptoms with axial loading.   Knee: no swelling, clicking, range of motion intact         Assessment and Plan:   The following treatment plan was discussed    1. Pain of right hip joint  XR for further eval, consider physical therapy   - DX-HIP-COMPLETE - UNILATERAL 2+ RIGHT; Future    2. Chronic bilateral thoracic back pain  Will try to get MRI report for further eval. No deformities     3. Chronic pain of both knees  Likely OA due to morbid obesity. XR for further eval.   - DX-KNEE COMPLETE 4+ LEFT; Future  - DX-KNEE COMPLETE 4+ RIGHT; Future    4. Obesity (BMI 30-39.9)  Counseling for a small portion, balanced diet, exercising 3-5 times per week.    5. Post menopausal problems  Climate control       Followup: Return in about 2 weeks (around 8/9/2017) for Long.

## 2017-07-29 ENCOUNTER — APPOINTMENT (OUTPATIENT)
Dept: RADIOLOGY | Facility: MEDICAL CENTER | Age: 47
End: 2017-07-29
Attending: EMERGENCY MEDICINE
Payer: MEDICARE

## 2017-07-29 ENCOUNTER — HOSPITAL ENCOUNTER (EMERGENCY)
Facility: MEDICAL CENTER | Age: 47
End: 2017-07-29
Attending: EMERGENCY MEDICINE
Payer: MEDICARE

## 2017-07-29 VITALS
SYSTOLIC BLOOD PRESSURE: 158 MMHG | TEMPERATURE: 98.5 F | WEIGHT: 260 LBS | BODY MASS INDEX: 38.51 KG/M2 | HEIGHT: 69 IN | DIASTOLIC BLOOD PRESSURE: 93 MMHG | RESPIRATION RATE: 18 BRPM | OXYGEN SATURATION: 93 % | HEART RATE: 71 BPM

## 2017-07-29 DIAGNOSIS — M79.672 CHRONIC PAIN IN LEFT FOOT: ICD-10-CM

## 2017-07-29 DIAGNOSIS — M25.572 CHRONIC PAIN OF LEFT ANKLE: ICD-10-CM

## 2017-07-29 DIAGNOSIS — G89.29 CHRONIC PAIN OF LEFT ANKLE: ICD-10-CM

## 2017-07-29 DIAGNOSIS — G89.29 CHRONIC PAIN IN LEFT FOOT: ICD-10-CM

## 2017-07-29 DIAGNOSIS — E66.9 OBESITY (BMI 30-39.9): ICD-10-CM

## 2017-07-29 PROCEDURE — 73610 X-RAY EXAM OF ANKLE: CPT | Mod: LT

## 2017-07-29 PROCEDURE — 302874 HCHG BANDAGE ACE 2 OR 3""

## 2017-07-29 PROCEDURE — 99284 EMERGENCY DEPT VISIT MOD MDM: CPT

## 2017-07-29 PROCEDURE — 29515 APPLICATION SHORT LEG SPLINT: CPT

## 2017-07-29 PROCEDURE — 73630 X-RAY EXAM OF FOOT: CPT | Mod: LT

## 2017-07-29 ASSESSMENT — PAIN SCALES - GENERAL: PAINLEVEL_OUTOF10: 10

## 2017-07-29 NOTE — ED NOTES
"Chief Complaint   Patient presents with   • Foot Pain     pt bib remsa from home c/o had sudden onset of pain in left foot while walking. states that hx of surgery on her left foot 2 yrs ago.      C/o tingling in left foot. CMS intact. Pt states it hurts to bear weight on her foot and everytime she walks she hears \" a click\".    "

## 2017-07-29 NOTE — ED AVS SNAPSHOT
Home Care Instructions                                                                                                                Cortney Gavin   MRN: [id number]    Department:  [facility], Emergency Dept   Date of Visit:  7/29/2017            [facility], Emergency Dept    [address]    [place] [address]    Phone:  [phone]      You were seen by     Alberto Gomez M.D.      Your Diagnosis Was     Chronic pain of left ankle     M25.572, G89.29       Follow-up Information     1. Follow up with Aguilar Schroeder M.D.. Schedule an appointment as soon as possible for a visit in 2 days.    Specialty:  Orthopaedics    Contact information    [address]  [address]  [facility] [address]  [phone]        Medication Information     Review all of your home medications and newly ordered medications with your primary doctor and/or pharmacist as soon as possible. Follow medication instructions as directed by your doctor and/or pharmacist.     Please keep your complete medication list with you and share with your physician. Update the information when medications are discontinued, doses are changed, or new medications (including over-the-counter products) are added; and carry medication information at all times in the event of emergency situations.               Medication List      ASK your doctor about these medications        Instructions    Morning Afternoon Evening Bedtime    buPROPion  MG Tb12   Commonly known as:  WELLBUTRIN-SR        Take 100 mg by mouth 2 times a day.   Dose:  100 mg                        carbamazepine 200 MG Tabs   Commonly known as:  TEGRETOL        Take 200 mg by mouth 2 Times a Day.   Dose:  200 mg                        gabapentin 300 MG Caps   Commonly known as:  NEURONTIN        Take 300 mg by mouth 3 times a day.   Dose:  300 mg                        ibuprofen 800 MG Tabs   Commonly known as:  MOTRIN        Take 800 mg by  mouth every day.   Dose:  800 mg                        trazodone 150 MG Tabs   Commonly known as:  DESYREL        Take 150 mg by mouth every evening.   Dose:  150 mg                        ziprasidone 60 MG Caps   Commonly known as:  GEODON        Take 60 mg by mouth 2 Times a Day.   Dose:  60 mg                                Procedures and tests performed during your visit     DX-ANKLE 3+ VIEWS LEFT    DX-FOOT-COMPLETE 3+ LEFT        Discharge Instructions       Ankle Pain  Ankle pain is a common symptom. The bones, cartilage, tendons, and muscles of the ankle joint perform a lot of work each day. The ankle joint holds your body weight and allows you to move around. Ankle pain can occur on either side or back of 1 or both ankles. Ankle pain may be sharp and burning or dull and aching. There may be tenderness, stiffness, redness, or warmth around the ankle. The pain occurs more often when a person walks or puts pressure on the ankle.  CAUSES   There are many reasons ankle pain can develop. It is important to work with your caregiver to identify the cause since many conditions can impact the bones, cartilage, muscles, and tendons. Causes for ankle pain include:  · Injury, including a break (fracture), sprain, or strain often due to a fall, sports, or a high-impact activity.  · Swelling (inflammation) of a tendon (tendonitis).  · Achilles tendon rupture.  · Ankle instability after repeated sprains and strains.  · Poor foot alignment.  · Pressure on a nerve (tarsal tunnel syndrome).  · Arthritis in the ankle or the lining of the ankle.  · Crystal formation in the ankle (gout or pseudogout).  DIAGNOSIS   A diagnosis is based on your medical history, your symptoms, results of your physical exam, and results of diagnostic tests. Diagnostic tests may include X-ray exams or a computerized magnetic scan (magnetic resonance imaging, MRI).  TREATMENT   Treatment will depend on the cause of your ankle pain and may  include:  · Keeping pressure off the ankle and limiting activities.  · Using crutches or other walking support (a cane or brace).  · Using rest, ice, compression, and elevation.  · Participating in physical therapy or home exercises.  · Wearing shoe inserts or special shoes.  · Losing weight.  · Taking medications to reduce pain or swelling or receiving an injection.  · Undergoing surgery.  HOME CARE INSTRUCTIONS   · Only take over-the-counter or prescription medicines for pain, discomfort, or fever as directed by your caregiver.  · Put ice on the injured area.  ¨ Put ice in a plastic bag.  ¨ Place a towel between your skin and the bag.  ¨ Leave the ice on for 15-20 minutes at a time, 03-04 times a day.  · Keep your leg raised (elevated) when possible to lessen swelling.  · Avoid activities that cause ankle pain.  · Follow specific exercises as directed by your caregiver.  · Record how often you have ankle pain, the location of the pain, and what it feels like. This information may be helpful to you and your caregiver.  · Ask your caregiver about returning to work or sports and whether you should drive.  · Follow up with your caregiver for further examination, therapy, or testing as directed.  SEEK MEDICAL CARE IF:   · Pain or swelling continues or worsens beyond 1 week.  · You have an oral temperature above 102° F (38.9° C).  · You are feeling unwell or have chills.  · You are having an increasingly difficult time with walking.  · You have loss of sensation or other new symptoms.  · You have questions or concerns.  MAKE SURE YOU:   · Understand these instructions.  · Will watch your condition.  · Will get help right away if you are not doing well or get worse.     This information is not intended to replace advice given to you by your health care provider. Make sure you discuss any questions you have with your health care provider.     Document Released: 06/07/2011 Document Revised: 03/11/2013 Document Reviewed:  06/07/2011  Elsevier Interactive Patient Education ©2016 Elsevier Inc.            Patient Information     Patient Information    Following emergency treatment: all patient requiring follow-up care must return either to a private physician or a clinic if your condition worsens before you are able to obtain further medical attention, please return to the emergency room.     Billing Information    At [facility], we work to make the billing process streamlined for our patients.  Our Representatives are here to answer any questions you may have regarding your hospital bill.  If you have insurance coverage and have supplied your insurance information to us, we will submit a claim to your insurer on your behalf.  Should you have any questions regarding your bill, we can be reached online or by phone as follows:  Online: You are able pay your bills online or live chat with our representatives about any billing questions you may have. We are here to help Monday - Friday from 8:00am to 7:30pm and 9:00am - 12:00pm on Saturdays.  Please visit [email or web address]  for more information.   Phone:  [phone] or [phone]    Please note that your emergency physician, surgeon, pathologist, radiologist, anesthesiologist, and other specialists are not employed by [facility] and will therefore bill separately for their services.  Please contact them directly for any questions concerning their bills at the numbers below:     Emergency Physician Services:  [phone]  [facility]:  [phone]  [facility]:  [phone]  [facility]:  [phone]    1. Your final bill may vary from the amount quoted upon discharge if all procedures are not complete at that time, or if your doctor has additional procedures of which we are not aware. You will receive an additional bill if you return to the Emergency Department at [facility] for suture removal  regardless of the facility of which the sutures were placed.     2. Please arrange for settlement of this account at the emergency registration.    3. All self-pay accounts are due in full at the time of treatment.  If you are unable to meet this obligation then payment is expected within 4-5 days.     4. If you have had radiology studies (CT, X-ray, Ultrasound, MRI), you have received a preliminary result during your emergency department visit. Please contact the radiology department [phone] to receive a copy of your final result. Please discuss the Final result with your primary physician or with the follow up physician provided.     Crisis Hotline:  [place] Crisis Hotline:  3-906-OBBQPAM or 1-483.393.8753  [place] Crisis Hotline:    1-968.934.6079 or 051-671-3736         ED Discharge Follow Up Questions    1. In order to provide you with very good care, we would like to follow up with a phone call in the next few days.  May we have your permission to contact you?     YES /  NO    2. What is the best phone number to call you? (       )_____-__________    3. What is the best time to call you?      Morning  /  Afternoon  /  Evening                   Patient Signature:  ____________________________________________________________    Date:  ____________________________________________________________      Your appointments     Aug 02, 2017  1:30 PM   MA [id number] with RB MG 3   [facility]ENTER ([address])    [address]  [place] [address] [phone]           No deodorant, powder, perfume or lotion under the arm or breast area.            Aug 10, 2017  9:40 AM   Established Patient with Rubi Ruby M.D.   [facility] (--)    [address]  [address]  [place] [address] [phone]           You will be receiving a confirmation call a few days before your appointment from our automated call confirmation system.

## 2017-07-29 NOTE — ED AVS SNAPSHOT
[email or web address] Access Code: Activation code not generated  Current [email or web address] Status: Patient Declined    Your email address is not on file at [employer].  Email Addresses are required for you to sign up for [email or web address], please contact [phone] to verify your personal information and to provide your email address prior to attempting to register for [email or web address].    Cortney MARTIN Romaine  [address] [place] [address]    [email or web address]  A secure, online tool to manage your health information     [employer]’s [email or web address]® is a secure, online tool that connects you to your personalized health information from the privacy of your home -- day or night - making it very easy for you to manage your healthcare. Once the activation process is completed, you can even access your medical information using the [email or web address] shbuham, which is available for free in the Apple Shubham store or Google Play store.     To learn more about [email or web address], visit [email or web address]    There are two levels of access available (as shown below):   My Chart Features  [facility] Primary Care Doctor [facility]  Specialists [facility]  Urgent  Care Non-[facility] Primary Care Doctor   Email your healthcare team securely and privately 24/7 X X X    Manage appointments: schedule your next appointment; view details of past/upcoming appointments X      Request prescription refills. X      View recent personal medical records, including lab and immunizations X X X X   View health record, including health history, allergies, medications X X X X   Read reports about your outpatient visits, procedures, consult and ER notes X X X X   See your discharge summary, which is a recap of your hospital and/or ER visit that includes your diagnosis, lab results, and care plan X X  X     How to register for [employer]:  Once your e-mail address has been verified, follow the following steps to sign up for [employer].     1. Go to  [email or web address]  2. Click on the Sign Up Now box, which takes you to the New  Member Sign Up page. You will need to provide the following information:  a. Enter your [employer] Access Code exactly as it appears at the top of this page. (You will not need to use this code after you’ve completed the sign-up process. If you do not sign up before the expiration date, you must request a new code.)   b. Enter your date of birth.   c. Enter your home email address.   d. Click Submit, and follow the next screen’s instructions.  3. Create a [employer] ID. This will be your [employer] login ID and cannot be changed, so think of one that is secure and easy to remember.  4. Create a [employer] password. You can change your password at any time.  5. Enter your Password Reset Question and Answer. This can be used at a later time if you forget your password.   6. Enter your e-mail address. This allows you to receive e-mail notifications when new information is available in [employer].  7. Click Sign Up. You can now view your health information.    For assistance activating your [employer] account, call [phone]

## 2017-07-29 NOTE — ED AVS SNAPSHOT
7/29/2017    AdeolaMARIO Gavin  [address]  [facility] [address]    Dear Cortney:    [facility] wants to ensure your discharge home is safe and you or your loved ones have had all of your questions answered regarding your care after you leave the hospital.    Below is a list of resources and contact information should you have any questions regarding your hospital stay, follow-up instructions, or active medical symptoms.    Questions or Concerns Regarding… Contact   Medical Questions Related to Your Discharge  (7 days a week, 8am-5pm) Contact a Nurse Care Coordinator   [phone]   Medical Questions Not Related to Your Discharge  (24 hours a day / 7 days a week)  Contact the Nurse Health Line   [phone]    Medications or Discharge Instructions Refer to your discharge packet   or contact your [facility] Primary Care Provider   [phone]   Follow-up Appointment(s) Schedule your appointment via ShoutWire   or contact Scheduling [phone]   Billing Review your statement via ShoutWire  or contact Billing [phone]   Medical Records Review your records via ShoutWire   or contact Medical Records [phone]     You may receive a telephone call within two days of discharge. This call is to make certain you understand your discharge instructions and have the opportunity to have any questions answered. You can also easily access your medical information, test results and upcoming appointments via the ShoutWire free online health management tool. You can learn more and sign up at [email or web address]. For assistance setting up your ShoutWire account, please call [phone].    Once again, we want to ensure your discharge home is safe and that you have a clear understanding of any next steps in your care. If you have any questions or concerns, please do not hesitate to contact us, we are here for you. Thank you for choosing [facility] for your healthcare needs.    Sincerely,    Your [facility] Healthcare Team

## 2017-07-29 NOTE — ED PROVIDER NOTES
ED Provider Note    Scribed for Alberto Gomez M.D. by Ivonne Jennings. 7/29/2017, 3:59 PM.    Primary care provider: Rubi Ruby M.D.  Means of arrival: Ambulance  History obtained from: Patient  History limited by: None    CHIEF COMPLAINT  Chief Complaint   Patient presents with   • Foot Pain     pt richmond dumas from home c/o had sudden onset of pain in left foot while walking. states that hx of surgery on her left foot 2 yrs ago.        HPI  Cortney Gavin is a 47 y.o. female who presents to the Emergency Department after being brought in by ambulance for worsened left foot pain onset 2-3 weeks ago. The patient has past surgical history on her left foot 2 years ago after breaking her ankle from stepping in a pothole. She states she began developing worsened left foot pain 2-3 weeks ago without any trauma or injury to have induced symptoms. Patient reports that symptoms significantly worsened today and that she cannot bear weight on the extremity, rating the pain 12/10 in severity. She has been seen by her PCP for the injury, but did not have any xrays performed.     Review of chart shows multiple ED visits. This is the 1st visit this month 6 visits last month. Last month she was seen for ankle swelling, medication side effect, foot pain neuropathy and suicidal, suicidal ideation off medication, depression, and right lower quadrant pain. Last year was seen for medication refill of nausea vomiting and lightheadedness.    REVIEW OF SYSTEMS  Review of Systems   Musculoskeletal: Positive for joint pain (Left foot).   All other systems reviewed and are negative.  C.     PAST MEDICAL HISTORY   has a past medical history of Chronic back pain; Schizophrenia (CMS-[facility]); Bipolar 1 disorder (CMS-[facility]); and Back pain.    SURGICAL HISTORY   has past surgical history that includes ankle arthroscopy and knee arthroplasty total.    SOCIAL HISTORY  Social History   Substance Use Topics   • Smoking status: Never Smoker    • Smokeless  "tobacco: Never Used   • Alcohol Use: No      History   Drug Use   • Yes   • Special: Inhaled     Comment: thc       FAMILY HISTORY  Family History   Problem Relation Age of Onset   • Heart Disease Mother      a fib   • Heart Disease Maternal Aunt      chf   • Cancer Maternal Grandfather      lung cancer, smoker   • Diabetes Neg Hx        CURRENT MEDICATIONS  No current facility-administered medications on file prior to encounter.     Current Outpatient Prescriptions on File Prior to Encounter   Medication Sig Dispense Refill   • trazodone (DESYREL) 150 MG Tab Take 150 mg by mouth every evening.     • ziprasidone (GEODON) 60 MG Cap Take 60 mg by mouth 2 Times a Day.     • buPROPion SR (WELLBUTRIN-SR) 100 MG TABLET SR 12 HR Take 100 mg by mouth 2 times a day.     • gabapentin (NEURONTIN) 300 MG Cap Take 300 mg by mouth 3 times a day.     • ibuprofen (MOTRIN) 800 MG Tab Take 800 mg by mouth every day.     • carbamazepine (TEGRETOL) 200 MG Tab Take 200 mg by mouth 2 Times a Day.         ALLERGIES  Allergies   Allergen Reactions   • Norco [Apap-Fd&C Yellow #10 [place]-Hydrocodone] Itching       PHYSICAL EXAM  VITAL SIGNS: /102 mmHg  Pulse 78  Temp(Src) 36.9 °C (98.5 °F) (Temporal)  Resp 18  Ht 1.753 m (5' 9\")  Wt 117.935 kg (260 lb)  BMI 38.38 kg/m2  SpO2 95%  LMP 07/17/2017    Constitutional: Well developed, Well nourished, No acute distress, Non-toxic appearance.   HENT: Normocephalic, Atraumatic, Bilateral external ears normal, Oropharynx moist, no evidence of dehydration, No oral exudates, Nose normal.   Eyes: PERRLA, EOMI, Conjunctiva normal, No discharge.   Neck: Normal range of motion, No tenderness, Supple, No stridor. No masses. No evidence of meningitis or meningismus.   Lymphatic: No lymphadenopathy noted.   Cardiovascular: Normal heart rate, Normal rhythm, No murmurs, No rubs, No gallops.   Thorax & Lungs: Normal breath sounds, No respiratory distress, No wheezing or rhonchi, No chest " tenderness.   Abdomen: Bowel sounds normal, Soft, No tenderness, No masses, No pulsatile masses. No guarding or rebound. No evidence of peritoneal findings.  Skin: Warm, Dry, No erythema, No rash. No exanthem.   Extremities:  Minimal swelling to bilateral malleoli. No cyanosis, No clubbing.   Neurologic: Alert & oriented x 3, Normal motor function, No focal deficits noted.   Psychiatric: Affect normal, mood normal.                                                              DIAGNOSTIC STUDIES / PROCEDURES    RADIOLOGY  DX-FOOT-COMPLETE 3+ LEFT   Final Result      1.  There is no acute fracture or malalignment of the left foot.      DX-ANKLE 3+ VIEWS LEFT   Final Result      No evidence of fracture or dislocation.      Post surgical changes as above described.      Asymmetry of the ankle mortise is of indeterminate age.      Calcaneal spurring.      Ankle joint effusion may be present.      The radiologist's interpretation of all radiological studies have been reviewed by me.    COURSE & MEDICAL DECISION MAKING  Nursing notes, VS, PMSFHx reviewed in chart.    Review of chart shows multiple ED visits. This is the 1st visit this month 6 visits last month. Last month she was seen for ankle swelling, medication side effect, foot pain neuropathy and suicidal, suicidal ideation off medication, depression, and right lower quadrant pain. Last year was seen for medication refill of nausea vomiting and lightheadedness.    4:00 PM - Patient seen and examined at bedside. Ordered DX-foot left to evaluate her symptoms.     4:59 PM Reviewed the patient's imaging results, which are shown above.     5:02 PM Patient was reevaluated at bedside. She is resting comfortably in bed. Discussed radiology results with the patient and informed them of the results noted above. She is referred to Ortho. She will also be placed in a splint to aid with ambulation. The patient will be discharged and should return if symptoms worsen or if new  symptoms arise. The patient understands and agrees to plan.       The patient will return for new or worsening symptoms and is stable at the time of discharge.    The patient is referred to a primary physician for blood pressure management, diabetic screening, and for all other preventative health concerns.    Arrangements were made for patient to get a walker.    DISPOSITION:  Patient will be discharged home in stable condition.    FOLLOW UP:  Aguilar Schroeder M.D.  [address]  [address]  [facility] [address]  [phone]    Schedule an appointment as soon as possible for a visit in 2 days      FINAL IMPRESSION  1. Chronic pain of left ankle    2. Chronic pain in left foot       Ivonne RIVAS (Lorenaibkike), am scribing for, and in the presence of, Alberto Gomez M.D..    Electronically signed by: Ivonne Jennings (Alex), 7/29/2017    Alberto RIVAS M.D. personally performed the services described in this documentation, as scribed by Ivonne Jennings in my presence, and it is both accurate and complete.    The note accurately reflects work and decisions made by me.  Alberto Gomez  7/29/2017  6:25 PM

## 2017-07-30 NOTE — ED NOTES
Pt discharge home. Pt given discharge instructions  Pt verbalized understanding, all questions answered ,vss upon d/c.

## 2017-07-30 NOTE — FACE TO FACE
Face to Face Note  -  Durable Medical Equipment    Alberto Gomez M.D. - NPI: [id number]  I certify that this patient is under my care and that they had a durable medical equipment(DME)face to face encounter by myself that meets the physician DME face-to-face encounter requirements with this patient on:    Date of encounter:   Patient:                    MRN:                       YOB: 2016  Cortney Gavin  [id number]  1970     The encounter with the patient was in whole, or in part, for the following medical condition, which is the primary reason for durable medical equipment:  Other - walker    I certify that, based on my findings, the following durable medical equipment is medically necessary:  Walkers.    HOME O2 Saturation Measurements:(Values must be present for Home Oxygen orders)         ,     ,         My Clinical findings support the need for the above equipment due to:  Other - foot pain    Supporting Symptoms: exam

## 2017-07-30 NOTE — DISCHARGE INSTRUCTIONS
Ankle Pain  Ankle pain is a common symptom. The bones, cartilage, tendons, and muscles of the ankle joint perform a lot of work each day. The ankle joint holds your body weight and allows you to move around. Ankle pain can occur on either side or back of 1 or both ankles. Ankle pain may be sharp and burning or dull and aching. There may be tenderness, stiffness, redness, or warmth around the ankle. The pain occurs more often when a person walks or puts pressure on the ankle.  CAUSES   There are many reasons ankle pain can develop. It is important to work with your caregiver to identify the cause since many conditions can impact the bones, cartilage, muscles, and tendons. Causes for ankle pain include:  · Injury, including a break (fracture), sprain, or strain often due to a fall, sports, or a high-impact activity.  · Swelling (inflammation) of a tendon (tendonitis).  · Achilles tendon rupture.  · Ankle instability after repeated sprains and strains.  · Poor foot alignment.  · Pressure on a nerve (tarsal tunnel syndrome).  · Arthritis in the ankle or the lining of the ankle.  · Crystal formation in the ankle (gout or pseudogout).  DIAGNOSIS   A diagnosis is based on your medical history, your symptoms, results of your physical exam, and results of diagnostic tests. Diagnostic tests may include X-ray exams or a computerized magnetic scan (magnetic resonance imaging, MRI).  TREATMENT   Treatment will depend on the cause of your ankle pain and may include:  · Keeping pressure off the ankle and limiting activities.  · Using crutches or other walking support (a cane or brace).  · Using rest, ice, compression, and elevation.  · Participating in physical therapy or home exercises.  · Wearing shoe inserts or special shoes.  · Losing weight.  · Taking medications to reduce pain or swelling or receiving an injection.  · Undergoing surgery.  HOME CARE INSTRUCTIONS   · Only take over-the-counter or prescription medicines for  pain, discomfort, or fever as directed by your caregiver.  · Put ice on the injured area.  ¨ Put ice in a plastic bag.  ¨ Place a towel between your skin and the bag.  ¨ Leave the ice on for 15-20 minutes at a time, 03-04 times a day.  · Keep your leg raised (elevated) when possible to lessen swelling.  · Avoid activities that cause ankle pain.  · Follow specific exercises as directed by your caregiver.  · Record how often you have ankle pain, the location of the pain, and what it feels like. This information may be helpful to you and your caregiver.  · Ask your caregiver about returning to work or sports and whether you should drive.  · Follow up with your caregiver for further examination, therapy, or testing as directed.  SEEK MEDICAL CARE IF:   · Pain or swelling continues or worsens beyond 1 week.  · You have an oral temperature above 102° F (38.9° C).  · You are feeling unwell or have chills.  · You are having an increasingly difficult time with walking.  · You have loss of sensation or other new symptoms.  · You have questions or concerns.  MAKE SURE YOU:   · Understand these instructions.  · Will watch your condition.  · Will get help right away if you are not doing well or get worse.     This information is not intended to replace advice given to you by your health care provider. Make sure you discuss any questions you have with your health care provider.     Document Released: 06/07/2011 Document Revised: 03/11/2013 Document Reviewed: 06/07/2011  [employer] Interactive Patient Education ©2016 [employer]

## 2017-08-01 ENCOUNTER — HOSPITAL ENCOUNTER (OUTPATIENT)
Dept: RADIOLOGY | Facility: MEDICAL CENTER | Age: 47
End: 2017-08-01
Attending: INTERNAL MEDICINE
Payer: MEDICARE

## 2017-08-01 ENCOUNTER — HOSPITAL ENCOUNTER (OUTPATIENT)
Dept: LAB | Facility: MEDICAL CENTER | Age: 47
End: 2017-08-01
Attending: INTERNAL MEDICINE
Payer: MEDICARE

## 2017-08-01 DIAGNOSIS — M25.562 CHRONIC PAIN OF BOTH KNEES: ICD-10-CM

## 2017-08-01 DIAGNOSIS — M25.561 CHRONIC PAIN OF BOTH KNEES: ICD-10-CM

## 2017-08-01 DIAGNOSIS — G89.29 CHRONIC PAIN OF BOTH KNEES: ICD-10-CM

## 2017-08-01 DIAGNOSIS — M25.551 PAIN OF RIGHT HIP JOINT: ICD-10-CM

## 2017-08-01 DIAGNOSIS — E66.9 OBESITY (BMI 30-39.9): ICD-10-CM

## 2017-08-01 LAB
CHOLEST SERPL-MCNC: 179 MG/DL (ref 100–199)
HDLC SERPL-MCNC: 45 MG/DL
LDLC SERPL CALC-MCNC: 106 MG/DL
TRIGL SERPL-MCNC: 140 MG/DL (ref 0–149)

## 2017-08-01 PROCEDURE — 73564 X-RAY EXAM KNEE 4 OR MORE: CPT | Mod: LT

## 2017-08-01 PROCEDURE — 73564 X-RAY EXAM KNEE 4 OR MORE: CPT | Mod: RT

## 2017-08-01 PROCEDURE — 73502 X-RAY EXAM HIP UNI 2-3 VIEWS: CPT | Mod: RT

## 2017-08-02 ENCOUNTER — HOSPITAL ENCOUNTER (OUTPATIENT)
Dept: RADIOLOGY | Facility: MEDICAL CENTER | Age: 47
End: 2017-08-02
Attending: INTERNAL MEDICINE
Payer: MEDICARE

## 2017-08-02 ENCOUNTER — TELEPHONE (OUTPATIENT)
Dept: MEDICAL GROUP | Facility: PHYSICIAN GROUP | Age: 47
End: 2017-08-02

## 2017-08-02 NOTE — PROGRESS NOTES
Quick Note:    Please let the patient know that the XR look good. We can discuss at her next appointment. Thank you  Rubi Ruby M.D.    ______

## 2017-08-02 NOTE — TELEPHONE ENCOUNTER
----- Message from Rubi Ruby M.D. sent at 8/2/2017  7:21 AM PDT -----  Please let the patient know that the  XR look good. We can discuss at her  next appointment. Thank you  Rubi Ruby M.D.

## 2017-08-03 ENCOUNTER — TELEPHONE (OUTPATIENT)
Dept: MEDICAL GROUP | Facility: PHYSICIAN GROUP | Age: 47
End: 2017-08-03

## 2017-08-03 NOTE — TELEPHONE ENCOUNTER
Left message for patient to call back regarding pre-visit planning. Please transfer call to Vidya to update demographics Pharm # Lab?

## 2017-08-03 NOTE — TELEPHONE ENCOUNTER
ESTABLISHED PATIENT PRE-VISIT PLANNING     Note: Patient will not be contacted if there is no indication to call.     1.  Reviewed notes from the last few office visits within the medical group: Yes    2.  If any orders were placed at last visit or intended to be done for this visit (i.e. 6 mos follow-up), do we have Results/Consult Notes?        •  Labs - Labs were not ordered at last office visit.       •  Imaging - Imaging was not ordered at last office visit.       •  Referrals - No referrals were ordered at last office visit.    3. Is this appointment scheduled as a Hospital Follow-Up? No    4.  Immunizations were updated in [facility] using WebIZ?: Yes       •  Web Iz Recommendations: FLU, MMR , TDAP and VARICELLA (Chicken Pox)     5.  Patient is due for the following Health Maintenance Topics:   Health Maintenance Due   Topic Date Due   • Annual Wellness Visit  1970   • IMM DTaP/Tdap/Td Vaccine (1 - Tdap) 07/06/1990   • PAP SMEAR  07/06/1992   • MAMMOGRAM  07/06/2011       - Patient has completed FLU Immunization(s) per WebIZ. Chart has been updated.    6.  Patient was NOT informed to arrive 15 min prior to their scheduled appointment and bring in their medication bottles.

## 2017-08-04 ENCOUNTER — OFFICE VISIT (OUTPATIENT)
Dept: MEDICAL GROUP | Facility: PHYSICIAN GROUP | Age: 47
End: 2017-08-04
Payer: MEDICARE

## 2017-08-04 VITALS
WEIGHT: 260 LBS | HEART RATE: 70 BPM | RESPIRATION RATE: 16 BRPM | HEIGHT: 69 IN | TEMPERATURE: 97.4 F | DIASTOLIC BLOOD PRESSURE: 62 MMHG | BODY MASS INDEX: 38.51 KG/M2 | OXYGEN SATURATION: 95 % | SYSTOLIC BLOOD PRESSURE: 118 MMHG

## 2017-08-04 DIAGNOSIS — F31.9 BIPOLAR 1 DISORDER (HCC): ICD-10-CM

## 2017-08-04 DIAGNOSIS — Z23 NEED FOR VACCINATION: ICD-10-CM

## 2017-08-04 DIAGNOSIS — M25.562 CHRONIC PAIN OF BOTH KNEES: ICD-10-CM

## 2017-08-04 DIAGNOSIS — G89.29 CHRONIC PAIN OF LEFT ANKLE: ICD-10-CM

## 2017-08-04 DIAGNOSIS — M25.551 PAIN OF RIGHT HIP JOINT: ICD-10-CM

## 2017-08-04 DIAGNOSIS — E66.9 OBESITY (BMI 30-39.9): ICD-10-CM

## 2017-08-04 DIAGNOSIS — M25.572 CHRONIC PAIN OF LEFT ANKLE: ICD-10-CM

## 2017-08-04 DIAGNOSIS — G89.29 CHRONIC PAIN OF BOTH KNEES: ICD-10-CM

## 2017-08-04 DIAGNOSIS — M25.561 CHRONIC PAIN OF BOTH KNEES: ICD-10-CM

## 2017-08-04 PROCEDURE — 99214 OFFICE O/P EST MOD 30 MIN: CPT | Performed by: INTERNAL MEDICINE

## 2017-08-04 ASSESSMENT — PATIENT HEALTH QUESTIONNAIRE - PHQ9: CLINICAL INTERPRETATION OF PHQ2 SCORE: 0

## 2017-08-04 NOTE — Clinical Note
[facility]  Rubi Ruby M.D.  [address]  [place] [address]  Fax: [phone]   Authorization for Release/Disclosure of   Protected Health Information   Name: KYLE KWON : 1970 SSN: [id number]   Address: [facility] [address]  [facility] [address] Phone:    [phone] (home)    I authorize the entity listed below to release/disclose the PHI below to:   [facility]/Rubi Ruby M.D. and Rubi Ruby M.D.   Provider or Entity Name:  [facility]    Address   City, State, [place], [place] Phone:      Fax:     Reason for request: continuity of care   Information to be released:    [  ] LAST COLONOSCOPY,  including any PATH REPORT and follow-up  [  ] LAST FIT/COLOGUARD RESULT [  ] LAST DEXA  [  ] LAST MAMMOGRAM  [  ] LAST PAP  [  ] LAST LABS [  ] RETINA EXAM REPORT  [  ] IMMUNIZATION RECORDS  [  ] Release all info      [  ] Check here and initial the line next to each item to release ALL health information INCLUDING  _____ Care and treatment for drug and / or alcohol abuse  _____ HIV testing, infection status, or AIDS  _____ Genetic Testing    DATES OF SERVICE OR TIME PERIOD TO BE DISCLOSED: _____________  I understand and acknowledge that:  * This Authorization may be revoked at any time by you in writing, except if your health information has already been used or disclosed.  * Your health information that will be used or disclosed as a result of you signing this authorization could be re-disclosed by the recipient. If this occurs, your re-disclosed health information may no longer be protected by State or Federal laws.  * You may refuse to sign this Authorization. Your refusal will not affect your ability to obtain treatment.  * This Authorization becomes effective upon signing and will  on (date) __________.      If no date is indicated, this Authorization will  one (1) year from the signature date.    Name: Kyle Kwon    Signature:   Date:     2017       PLEASE FAX REQUESTED RECORDS  BACK TO: [phone]

## 2017-08-04 NOTE — MR AVS SNAPSHOT
"        Cortney Gavin   2017 8:40 AM   Office Visit   MRN: [id number]    Department:  [facility]   Dept Phone:  [phone]    Description:  Female : 1970   Provider:  Rubi Ruby M.D.           Reason for Visit     Results labs/ Xray      Allergies as of 2017     Allergen Noted Reactions    Norco [Apap-Fd&C Yellow #10 [place]-Hydrocodone] 2017   Itching      You were diagnosed with     Bipolar 1 disorder (CMS-HCC)   [608549]       Chronic pain of both knees   [4127782]       Pain of right hip joint   [6114941]       Chronic pain of left ankle   [9396464]       Obesity (BMI 30-39.9)   [514483]       Need for vaccination   [023951]         Vital Signs     Blood Pressure Pulse Temperature Respirations Height Weight    118/62 mmHg 70 36.3 °C (97.4 °F) 16 1.753 m (5' 9.02\") 117.935 kg (260 lb)    Body Mass Index Oxygen Saturation Last Menstrual Period Breastfeeding? Smoking Status       38.38 kg/m2 95% 2017 No Never Smoker        Basic Information     Date Of Birth Sex Race Ethnicity Preferred Language    1970 Female White Non- English      Your appointments     Aug 22, 2017 10:00 AM   MA DX30 with [facility] MG 1   [facility]    [address]  [place] [id number] [phone]            Sep 07, 2017 10:40 AM   Established Patient with Rubi Ruby M.D.   [facility] (--)    [address]  [address]  [place] [address] [phone]           You will be receiving a confirmation call a few days before your appointment from our automated call confirmation system.              Problem List              ICD-10-CM Priority Class Noted - Resolved    Obesity (BMI 30-39.9) E66.9   2017 - Present    Chronic bilateral thoracic back pain M54.6, G89.29   2017 - Present    Bipolar 1 disorder (CMS-HCC) F31.9   2017 - Present    Primary insomnia F51.01   2017 - Present    Memory problem R41.3   2017 - " Present    Pain of right hip joint M25.551   7/26/2017 - Present    Chronic pain of both knees M25.561, M25.562, G89.29   7/26/2017 - Present    Post menopausal problems N95.9   7/26/2017 - Present    Normocytic anemia D64.9   7/26/2017 - Present    Chronic pain of left ankle M25.572, G89.29   8/4/2017 - Present      Health Maintenance        Date Due Completion Dates    IMM DTaP/Tdap/Td Vaccine (1 - Tdap) 7/6/1990 ---    PAP SMEAR 7/6/1992 ---    MAMMOGRAM 7/6/2011 ---    IMM INFLUENZA (1) 9/1/2017 10/15/2016, 8/30/2015            Current Immunizations     Influenza Vaccine Quad Inj (Preserved) 10/15/2016, 8/30/2015      Below and/or attached are the medications your provider expects you to take. Review all of your home medications and newly ordered medications with your provider and/or pharmacist. Follow medication instructions as directed by your provider and/or pharmacist. Please keep your medication list with you and share with your provider. Update the information when medications are discontinued, doses are changed, or new medications (including over-the-counter products) are added; and carry medication information at all times in the event of emergency situations     Allergies:  NORCO - Itching               Medications  Valid as of: August 04, 2017 -  9:17 AM    Generic Name Brand Name Tablet Size Instructions for use    BuPROPion HCl (TABLET SR 12 HR) WELLBUTRIN- MG Take 100 mg by mouth 2 times a day.        CarBAMazepine (Tab) TEGRETOL 200 MG Take 200 mg by mouth 2 Times a Day.        Gabapentin (Cap) NEURONTIN 300 MG Take 300 mg by mouth 3 times a day.        Ibuprofen (Tab) MOTRIN 800 MG Take 800 mg by mouth every day.        Tetanus Toxoid Adsorbed (Solution) TETANUS TOXOID ADSORBED 5 LFU 0.5 mL by Intramuscular route Once for 1 dose.        TraZODone HCl (Tab) DESYREL 150 MG Take 150 mg by mouth every evening.        Ziprasidone HCl (Cap) GEODON 60 MG Take 60 mg by mouth 2 Times a Day.        .                  Medicines prescribed today were sent to:     [id number] - [place], [place] - [address]    [address] [place] [address]    Phone: [phone] Fax: [phone]    Open 24 Hours?: No      Medication refill instructions:       If your prescription bottle indicates you have medication refills left, it is not necessary to call your provider’s office. Please contact your pharmacy and they will refill your medication.    If your prescription bottle indicates you do not have any refills left, you may request refills at any time through one of the following ways: The online [employer] system (except Urgent Care), by calling your provider’s office, or by asking your pharmacy to contact your provider’s office with a refill request. Medication refills are processed only during regular business hours and may not be available until the next business day. Your provider may request additional information or to have a follow-up visit with you prior to refilling your medication.   *Please Note: Medication refills are assigned a new Rx number when refilled electronically. Your pharmacy may indicate that no refills were authorized even though a new prescription for the same medication is available at the pharmacy. Please request the medicine by name with the pharmacy before contacting your provider for a refill.        Referral     A referral request has been sent to our patient care coordination department. Please allow 3-5 business days for us to process this request and contact you either by phone or mail. If you do not hear from us by the 5th business day, please call us at [phone].           [employer] Access Code: [email or web address]  Expires: 8/30/2017 10:42 AM    [employer]  A secure, online tool to manage your health information     [employer]® is a secure, online tool that connects you to your personalized health information from the privacy of your home -- day or night - making it very  easy for you to manage your healthcare. Once the activation process is completed, you can even access your medical information using the HipSwap shubham, which is available for free in the [employer] Shubham store or Google Play store.     HipSwap provides the following levels of access (as shown below):   My Chart Features   [facility] Doctor [facility]  Specialists [facility]  Urgent  Care Non-[facility]  Primary Care  Doctor   Email your healthcare team securely and privately 24/7 X X X    Manage appointments: schedule your next appointment; view details of past/upcoming appointments X      Request prescription refills. X      View recent personal medical records, including lab and immunizations X X X X   View health record, including health history, allergies, medications X X X X   Read reports about your outpatient visits, procedures, consult and ER notes X X X X   See your discharge summary, which is a recap of your hospital and/or ER visit that includes your diagnosis, lab results, and care plan. X X       How to register for HipSwap:  1. Go to  [email or web address].  2. Click on the Sign Up Now box, which takes you to the New Member Sign Up page. You will need to provide the following information:  a. Enter your HipSwap Access Code exactly as it appears at the top of this page. (You will not need to use this code after you’ve completed the sign-up process. If you do not sign up before the expiration date, you must request a new code.)   b. Enter your date of birth.   c. Enter your home email address.   d. Click Submit, and follow the next screen’s instructions.  3. Create a HipSwap ID. This will be your HipSwap login ID and cannot be changed, so think of one that is secure and easy to remember.  4. Create a HipSwap password. You can change your password at any time.  5. Enter your Password Reset Question and Answer. This can be used at a later time if you forget your password.   6. Enter your e-mail address. This  allows you to receive e-mail notifications when new information is available in [employer].  7. Click Sign Up. You can now view your health information.    For assistance activating your [employer] account, call [phone]

## 2017-08-04 NOTE — PROGRESS NOTES
Subjective:   Cortney Gavin is a 47 y.o. female here today for follow up on XR, lab work   47-year-old female with past medical history of morbid obesity, bipolar disorder, chronic back pain present to follow-up on hip XR, knee XR. Her ankle was hurting so she went to ER. XR of her hip showed mild osteoarthritis. Knee XR right mild ostearthritis. Knee XR left showed Single screw in the anterior aspect of the proximal tibial metaphysis, mild osteoarthritis. At previous apt she was reporting right hip pain 8/10, while smiling and laughing. She has chronic back pain, MRI done to other state.  to pain specialist. XR of the left ankle showed some fluid joint effusion, s/p surgery, Asymmetry of the ankle mortise is of indeterminate age. She tells me that she is going to see orthopedics physician. But again old fracture  Mostly contributory to her pain is her weight. Discuss in length in regards to weight loss. Counseling for small portions, balanced diet, start walking as tolerated. Health improvement referral in place   She was been told that she has had abnormal mammogram in the past therefore she was not able to do mammogram this time, because waiting to get previous records  LDL slight elevated. No need for treatment, encourage healthy lifestyle.       Current medicines (including changes today)  Current Outpatient Prescriptions   Medication Sig Dispense Refill   • tetanus toxoid (TETANUS TOXOID ADSORBED) 5 LFU Solution 0.5 mL by Intramuscular route Once for 1 dose. 0.5 mL 0   • trazodone (DESYREL) 150 MG Tab Take 150 mg by mouth every evening.     • ziprasidone (GEODON) 60 MG Cap Take 60 mg by mouth 2 Times a Day.     • buPROPion SR (WELLBUTRIN-SR) 100 MG TABLET SR 12 HR Take 100 mg by mouth 2 times a day.     • gabapentin (NEURONTIN) 300 MG Cap Take 300 mg by mouth 3 times a day.     • ibuprofen (MOTRIN) 800 MG Tab Take 800 mg by mouth every day.     • carbamazepine (TEGRETOL) 200 MG Tab Take 200 mg by mouth 2  Times a Day.       No current facility-administered medications for this visit.     She  has a past medical history of Chronic back pain; Schizophrenia (CMS-[facility]); Bipolar 1 disorder (CMS-HCC); and Back pain. She also has no past medical history of Diabetes (CMS-HCC).    Current Outpatient Prescriptions   Medication Sig Dispense Refill   • tetanus toxoid (TETANUS TOXOID ADSORBED) 5 LFU Solution 0.5 mL by Intramuscular route Once for 1 dose. 0.5 mL 0   • trazodone (DESYREL) 150 MG Tab Take 150 mg by mouth every evening.     • ziprasidone (GEODON) 60 MG Cap Take 60 mg by mouth 2 Times a Day.     • buPROPion SR (WELLBUTRIN-SR) 100 MG TABLET SR 12 HR Take 100 mg by mouth 2 times a day.     • gabapentin (NEURONTIN) 300 MG Cap Take 300 mg by mouth 3 times a day.     • ibuprofen (MOTRIN) 800 MG Tab Take 800 mg by mouth every day.     • carbamazepine (TEGRETOL) 200 MG Tab Take 200 mg by mouth 2 Times a Day.       No current facility-administered medications for this visit.       Allergies as of 08/04/2017 - Bipin as Reviewed 08/04/2017   Allergen Reaction Noted   • Norco [apap-fd&c yellow #10 [place]-hydrocodone] Itching 06/07/2017       Social History     Social History   • Marital Status:      Spouse Name: N/A   • Number of Children: N/A   • Years of Education: N/A     Occupational History   • Not on file.     Social History Main Topics   • Smoking status: Never Smoker    • Smokeless tobacco: Never Used   • Alcohol Use: No   • Drug Use: Yes     Special: Inhaled      Comment: thc   • Sexual Activity: No     Other Topics Concern   • Not on file     Social History Narrative        Family History   Problem Relation Age of Onset   • Heart Disease Mother      a fib   • Heart Disease Maternal Aunt      chf   • Cancer Maternal Grandfather      lung cancer, smoker   • Diabetes Neg Hx        Past Surgical History   Procedure Laterality Date   • Ankle arthroscopy     • Knee arthroplasty total       4 surgeries, bilteral   "      ROS   No chest pain, no shortness of breath, no abdominal pain       Objective:     Blood pressure 118/62, pulse 70, temperature 36.3 °C (97.4 °F), resp. rate 16, height 1.753 m (5' 9.02\"), weight 117.935 kg (260 lb), last menstrual period 08/04/2017, SpO2 95 %, not currently breastfeeding. Body mass index is 38.38 kg/(m^2).   Physical Exam:  Constitutional: Alert, no distress, obese.  Skin: Warm, dry, good turgor, no rashes in visible areas.  Eye: Equal, round and reactive, conjunctiva clear, lids normal.  ENMT: Lips without lesions,   Neck: Trachea midline,   Respiratory: Unlabored respiratory effort,    Psych: Alert and oriented x3, normal affect and mood.        Assessment and Plan:   The following treatment plan was discussed    1. Bipolar 1 disorder (CMS-[facility])  Follow up with psychiatry, not maniacal     2. Chronic pain of both knees  3. Pain of right hip joint  Mild OA. Pain not proportional to imaging and examination. She will feel better once loosing some weight     4. Chronic pain of left ankle  Follow up with ortho    5. Obesity (BMI 30-39.9)  Encourage weight loss. Counseling for a small portion, balanced diet, walking. Goal is loosing 4-6 lbs in one month     - REFERRAL TO [facility] HEALTH IMPROVEMENT PROGRAMS (HIP) Services Requested:: Registered Dietitian for Medical Nutrition Therapy, Weight Management Program, Medicare Obesity Counseling; Reason for Visit:: Overweight/Obesity    6. Need for vaccination  - tetanus toxoid (TETANUS TOXOID ADSORBED) 5 LFU Solution; 0.5 mL by Intramuscular route Once for 1 dose.  Dispense: 0.5 mL; Refill: 0      Followup: Return in about 4 weeks (around 9/1/2017) for Short.           "

## 2017-08-10 ENCOUNTER — HOSPITAL ENCOUNTER (OUTPATIENT)
Dept: RADIOLOGY | Facility: MEDICAL CENTER | Age: 47
End: 2017-08-10
Attending: ORTHOPAEDIC SURGERY
Payer: MEDICARE

## 2017-08-10 DIAGNOSIS — M25.372 UNSTABLE LEFT ANKLE: ICD-10-CM

## 2017-08-10 PROCEDURE — 73700 CT LOWER EXTREMITY W/O DYE: CPT | Mod: LT

## 2017-08-15 ENCOUNTER — APPOINTMENT (OUTPATIENT)
Dept: RADIOLOGY | Facility: MEDICAL CENTER | Age: 47
End: 2017-08-15
Attending: PHYSICAL MEDICINE & REHABILITATION
Payer: MEDICARE

## 2017-08-15 DIAGNOSIS — M54.12 CERVICAL RADICULITIS: ICD-10-CM

## 2017-08-15 DIAGNOSIS — M54.16 LUMBAR RADICULOPATHY: ICD-10-CM

## 2017-08-15 PROCEDURE — 72146 MRI CHEST SPINE W/O DYE: CPT

## 2017-08-15 PROCEDURE — 72141 MRI NECK SPINE W/O DYE: CPT

## 2017-08-15 PROCEDURE — 72148 MRI LUMBAR SPINE W/O DYE: CPT

## 2017-08-16 DIAGNOSIS — Z01.812 PRE-OPERATIVE LABORATORY EXAMINATION: ICD-10-CM

## 2017-08-16 LAB
ERYTHROCYTE [DISTWIDTH] IN BLOOD BY AUTOMATED COUNT: 45.5 FL (ref 35.9–50)
HCT VFR BLD AUTO: 41.9 % (ref 37–47)
HGB BLD-MCNC: 13.9 G/DL (ref 12–16)
MCH RBC QN AUTO: 30.4 PG (ref 27–33)
MCHC RBC AUTO-ENTMCNC: 33.2 G/DL (ref 33.6–35)
MCV RBC AUTO: 91.7 FL (ref 81.4–97.8)
PLATELET # BLD AUTO: 238 K/UL (ref 164–446)
PMV BLD AUTO: 10.7 FL (ref 9–12.9)
RBC # BLD AUTO: 4.57 M/UL (ref 4.2–5.4)
WBC # BLD AUTO: 7.5 K/UL (ref 4.8–10.8)

## 2017-08-16 PROCEDURE — 85027 COMPLETE CBC AUTOMATED: CPT

## 2017-08-16 PROCEDURE — 36415 COLL VENOUS BLD VENIPUNCTURE: CPT

## 2017-08-16 RX ORDER — NAPROXEN SODIUM 220 MG
220 TABLET ORAL PRN
COMMUNITY
End: 2017-09-01

## 2017-08-16 NOTE — OR NURSING
"Preadmit appointment: \" Preparing for your Procedure information\" sheet given to patient with verbal and written instructions. Patient instructed to continue prescribed medications through the day before surgery, instructed to take the following medications the day of surgery per anesthesia protocol:Tegretol, gabapentin, geodon.   "

## 2017-08-18 ENCOUNTER — APPOINTMENT (OUTPATIENT)
Dept: RADIOLOGY | Facility: MEDICAL CENTER | Age: 47
End: 2017-08-18
Attending: ORTHOPAEDIC SURGERY
Payer: MEDICARE

## 2017-08-18 ENCOUNTER — HOSPITAL ENCOUNTER (OUTPATIENT)
Facility: MEDICAL CENTER | Age: 47
End: 2017-08-18
Attending: ORTHOPAEDIC SURGERY | Admitting: ORTHOPAEDIC SURGERY
Payer: MEDICARE

## 2017-08-18 VITALS
OXYGEN SATURATION: 94 % | RESPIRATION RATE: 16 BRPM | WEIGHT: 251.32 LBS | DIASTOLIC BLOOD PRESSURE: 75 MMHG | HEIGHT: 68 IN | BODY MASS INDEX: 38.09 KG/M2 | SYSTOLIC BLOOD PRESSURE: 131 MMHG | TEMPERATURE: 97.8 F

## 2017-08-18 DIAGNOSIS — M25.372 UNSTABLE LEFT ANKLE: ICD-10-CM

## 2017-08-18 LAB
B-HCG FREE SERPL-ACNC: <5 MIU/ML
IHCGL IHCGL: NEGATIVE MIU/ML

## 2017-08-18 PROCEDURE — C1713 ANCHOR/SCREW BN/BN,TIS/BN: HCPCS | Performed by: ORTHOPAEDIC SURGERY

## 2017-08-18 PROCEDURE — 160025 RECOVERY II MINUTES (STATS): Performed by: ORTHOPAEDIC SURGERY

## 2017-08-18 PROCEDURE — 700105 HCHG RX REV CODE 258: Performed by: ORTHOPAEDIC SURGERY

## 2017-08-18 PROCEDURE — 160029 HCHG SURGERY MINUTES - 1ST 30 MINS LEVEL 4: Performed by: ORTHOPAEDIC SURGERY

## 2017-08-18 PROCEDURE — 160009 HCHG ANES TIME/MIN: Performed by: ORTHOPAEDIC SURGERY

## 2017-08-18 PROCEDURE — A9270 NON-COVERED ITEM OR SERVICE: HCPCS

## 2017-08-18 PROCEDURE — 160046 HCHG PACU - 1ST 60 MINS PHASE II: Performed by: ORTHOPAEDIC SURGERY

## 2017-08-18 PROCEDURE — A6222 GAUZE <=16 IN NO W/SAL W/O B: HCPCS | Performed by: ORTHOPAEDIC SURGERY

## 2017-08-18 PROCEDURE — 700101 HCHG RX REV CODE 250

## 2017-08-18 PROCEDURE — 501244 HCHG SCREW, ASIF FINE THRD: Performed by: ORTHOPAEDIC SURGERY

## 2017-08-18 PROCEDURE — 84702 CHORIONIC GONADOTROPIN TEST: CPT

## 2017-08-18 PROCEDURE — 160041 HCHG SURGERY MINUTES - EA ADDL 1 MIN LEVEL 4: Performed by: ORTHOPAEDIC SURGERY

## 2017-08-18 PROCEDURE — 700102 HCHG RX REV CODE 250 W/ 637 OVERRIDE(OP)

## 2017-08-18 PROCEDURE — 700111 HCHG RX REV CODE 636 W/ 250 OVERRIDE (IP)

## 2017-08-18 PROCEDURE — 501445 HCHG STAPLER, SKIN DISP: Performed by: ORTHOPAEDIC SURGERY

## 2017-08-18 PROCEDURE — 160048 HCHG OR STATISTICAL LEVEL 1-5: Performed by: ORTHOPAEDIC SURGERY

## 2017-08-18 PROCEDURE — 160047 HCHG PACU  - EA ADDL 30 MINS PHASE II: Performed by: ORTHOPAEDIC SURGERY

## 2017-08-18 PROCEDURE — 160035 HCHG PACU - 1ST 60 MINS PHASE I: Performed by: ORTHOPAEDIC SURGERY

## 2017-08-18 PROCEDURE — A6454 SELF-ADHER BAND W>=3" <5"/YD: HCPCS | Performed by: ORTHOPAEDIC SURGERY

## 2017-08-18 PROCEDURE — 73610 X-RAY EXAM OF ANKLE: CPT | Mod: LT

## 2017-08-18 PROCEDURE — 501838 HCHG SUTURE GENERAL: Performed by: ORTHOPAEDIC SURGERY

## 2017-08-18 PROCEDURE — 500881 HCHG PACK, EXTREMITY: Performed by: ORTHOPAEDIC SURGERY

## 2017-08-18 PROCEDURE — 500054 HCHG BANDAGE, ELASTIC 6: Performed by: ORTHOPAEDIC SURGERY

## 2017-08-18 PROCEDURE — 160036 HCHG PACU - EA ADDL 30 MINS PHASE I: Performed by: ORTHOPAEDIC SURGERY

## 2017-08-18 PROCEDURE — E0114 CRUTCH UNDERARM PAIR NO WOOD: HCPCS | Performed by: ORTHOPAEDIC SURGERY

## 2017-08-18 PROCEDURE — 160002 HCHG RECOVERY MINUTES (STAT): Performed by: ORTHOPAEDIC SURGERY

## 2017-08-18 DEVICE — IMPLANTABLE DEVICE: Type: IMPLANTABLE DEVICE | Site: ANKLE | Status: FUNCTIONAL

## 2017-08-18 DEVICE — SCREW CORT 3.5X55MM ST HEX - (4SFLX6+MDFTX3=27)(SDS=4): Type: IMPLANTABLE DEVICE | Site: ANKLE | Status: FUNCTIONAL

## 2017-08-18 DEVICE — SCREW CORT 3.5X45MM ST HEX (4TX6=24)(SDS=18): Type: IMPLANTABLE DEVICE | Site: ANKLE | Status: FUNCTIONAL

## 2017-08-18 RX ORDER — LIDOCAINE HYDROCHLORIDE 10 MG/ML
INJECTION, SOLUTION INFILTRATION; PERINEURAL
Status: COMPLETED
Start: 2017-08-18 | End: 2017-08-18

## 2017-08-18 RX ORDER — OXYCODONE HCL 10 MG/1
TABLET, FILM COATED, EXTENDED RELEASE ORAL
Status: COMPLETED
Start: 2017-08-18 | End: 2017-08-18

## 2017-08-18 RX ORDER — CELECOXIB 200 MG/1
CAPSULE ORAL
Status: COMPLETED
Start: 2017-08-18 | End: 2017-08-18

## 2017-08-18 RX ORDER — BUPIVACAINE HYDROCHLORIDE AND EPINEPHRINE 5; 5 MG/ML; UG/ML
INJECTION, SOLUTION PERINEURAL
Status: DISCONTINUED | OUTPATIENT
Start: 2017-08-18 | End: 2017-08-18 | Stop reason: HOSPADM

## 2017-08-18 RX ORDER — OXYCODONE AND ACETAMINOPHEN 10; 325 MG/1; MG/1
.5-1 TABLET ORAL
Qty: 60 TAB | Refills: 0 | Status: SHIPPED | OUTPATIENT
Start: 2017-08-18 | End: 2017-08-20

## 2017-08-18 RX ORDER — ACETAMINOPHEN 500 MG
TABLET ORAL
Status: COMPLETED
Start: 2017-08-18 | End: 2017-08-18

## 2017-08-18 RX ORDER — SODIUM CHLORIDE, SODIUM LACTATE, POTASSIUM CHLORIDE, CALCIUM CHLORIDE 600; 310; 30; 20 MG/100ML; MG/100ML; MG/100ML; MG/100ML
1000 INJECTION, SOLUTION INTRAVENOUS
Status: DISCONTINUED | OUTPATIENT
Start: 2017-08-18 | End: 2017-08-18 | Stop reason: HOSPADM

## 2017-08-18 RX ORDER — GABAPENTIN 300 MG/1
CAPSULE ORAL
Status: COMPLETED
Start: 2017-08-18 | End: 2017-08-18

## 2017-08-18 RX ORDER — MEPERIDINE HYDROCHLORIDE 25 MG/ML
INJECTION INTRAMUSCULAR; INTRAVENOUS; SUBCUTANEOUS
Status: COMPLETED
Start: 2017-08-18 | End: 2017-08-18

## 2017-08-18 RX ADMIN — LIDOCAINE HYDROCHLORIDE: 10 INJECTION, SOLUTION INFILTRATION; PERINEURAL at 09:02

## 2017-08-18 RX ADMIN — CELECOXIB 400 MG: 200 CAPSULE ORAL at 09:24

## 2017-08-18 RX ADMIN — OXYCODONE HYDROCHLORIDE 10 MG: 10 TABLET, FILM COATED, EXTENDED RELEASE ORAL at 09:24

## 2017-08-18 RX ADMIN — ACETAMINOPHEN 1000 MG: 500 TABLET, COATED ORAL at 09:24

## 2017-08-18 RX ADMIN — GABAPENTIN 600 MG: 300 CAPSULE ORAL at 09:24

## 2017-08-18 RX ADMIN — MEPERIDINE HYDROCHLORIDE 25 MG: 25 INJECTION INTRAMUSCULAR; INTRAVENOUS; SUBCUTANEOUS at 12:05

## 2017-08-18 RX ADMIN — SODIUM CHLORIDE, POTASSIUM CHLORIDE, SODIUM LACTATE AND CALCIUM CHLORIDE 1000 ML: 600; 310; 30; 20 INJECTION, SOLUTION INTRAVENOUS at 09:03

## 2017-08-18 RX ADMIN — FENTANYL CITRATE 50 MCG: 50 INJECTION, SOLUTION INTRAMUSCULAR; INTRAVENOUS at 11:56

## 2017-08-18 RX ADMIN — FENTANYL CITRATE 50 MCG: 50 INJECTION, SOLUTION INTRAMUSCULAR; INTRAVENOUS at 12:18

## 2017-08-18 ASSESSMENT — PAIN SCALES - GENERAL
PAINLEVEL_OUTOF10: 7
PAINLEVEL_OUTOF10: 6
PAINLEVEL_OUTOF10: ASSUMED PAIN PRESENT
PAINLEVEL_OUTOF10: 8
PAINLEVEL_OUTOF10: 10
PAINLEVEL_OUTOF10: 10
PAINLEVEL_OUTOF10: 8
PAINLEVEL_OUTOF10: 10
PAINLEVEL_OUTOF10: 8
PAINLEVEL_OUTOF10: 8

## 2017-08-18 NOTE — OR NURSING
1320- report from April RN. Pt oxygen sat decreased to 59% on RA. Pt back on 1L O2 NC.  Lung sounds decreased at bases.  1327- Pt meets stage 2 criteria, however will continue to work on increasing RA O2 sat.  Plan to ambulate pt in stage 2.  Report to Edwina VILLANUEVA.

## 2017-08-18 NOTE — IP AVS SNAPSHOT
" Home Care Instructions                                                                                                                Name:Cortney Gavin  Medical Record Number:[id number]  CSN: [id number]    YOB: 1970   Age: 47 y.o.  Sex: female  HT:1.727 m (5' 7.99\") WT: 114 kg (251 lb 5.2 oz)          Admit Date: 8/18/2017     Discharge Date:   Today's Date: 8/18/2017  Attending Doctor:  Aguilar Schroeder M.D.                  Allergies:  Norco              Follow-up Information     1. Follow up with Aguilar Schroeder M.D..    Specialty:  Orthopaedics    Why:  Confirm fu appt for 2 weeks postop    Contact information    [address]  [address]  [place] [id number] [phone]          Discharge Instructions         ACTIVITY: Rest and take it easy for the first 24 hours.  A responsible adult is recommended to remain with you during that time.  It is normal to feel sleepy.  We encourage you to not do anything that requires balance, judgment or coordination.    MILD FLU-LIKE SYMPTOMS ARE NORMAL. YOU MAY EXPERIENCE GENERALIZED MUSCLE ACHES, THROAT IRRITATION, HEADACHE AND/OR SOME NAUSEA.    FOR 24 HOURS DO NOT:  Drive, operate machinery or run household appliances.  Drink beer or alcoholic beverages.   Make important decisions or sign legal documents.    SPECIAL INSTRUCTIONS:   --okay to discharge to home with crutches and/or walker if needed to maintain non weight bearing left lower extremity   --non weight bearing Left lower extremity in splint   --keep splint clean and dry   --Rx for percocet as needed.  --elevate extremity when not ambulating   --Follow up in 2 weeks postop for staple removal, bring boot for appointment    DIET: To avoid nausea, slowly advance diet as tolerated, avoiding spicy or greasy foods for the first day.  Add more substantial food to your diet according to your physician's instructions.  INCREASE FLUIDS AND FIBER TO AVOID CONSTIPATION.    FOLLOW-UP APPOINTMENT:  A " follow-up appointment should be arranged with your doctor in office in 2 weeks; call to schedule.    You should CALL YOUR PHYSICIAN if you develop:  Fever greater than 101 degrees F.  Pain not relieved by medication, or persistent nausea or vomiting.  Excessive bleeding (blood soaking through dressing) or unexpected drainage from the wound.  Extreme redness or swelling around the incision site, drainage of pus or foul smelling drainage.  Inability to urinate or empty your bladder within 8 hours.  Problems with breathing or chest pain.    You should call 911 if you develop problems with breathing or chest pain.  If you are unable to contact your doctor or surgical center, you should go to the nearest emergency room or urgent care center.  Dr Schroeder's telephone #: [phone]    If any questions arise, call your doctor.  If your doctor is not available, please feel free to call the Surgical Center at [phone].  The Center is open Monday through Friday from 7AM to 7PM.  You can also call the HEALTH HOTLINE open 24 hours/day, 7 days/week and speak to a nurse at [phone], or toll free at [phone].    A registered nurse may call you a few days after your surgery to see how you are doing after your procedure.    MEDICATIONS: Resume taking daily medication.  Take prescribed pain medication with food.  If no medication is prescribed, you may take non-aspirin pain medication if needed.  PAIN MEDICATION CAN BE VERY CONSTIPATING.  Take a stool softener or laxative such as senokot, pericolace, or milk of magnesia if needed.    Prescription given for Percocet.  Last pain medication given at none.    If your physician has prescribed pain medication that includes Acetaminophen (Tylenol), do not take additional Acetaminophen (Tylenol) while taking the prescribed medication.    Depression / Suicide Risk    As you are discharged from this [facility] facility, it is important to learn how to keep safe from harming  yourself.    Recognize the warning signs:  · Abrupt changes in personality, positive or negative- including increase in energy   · Giving away possessions  · Change in eating patterns- significant weight changes-  positive or negative  · Change in sleeping patterns- unable to sleep or sleeping all the time   · Unwillingness or inability to communicate  · Depression  · Unusual sadness, discouragement and loneliness  · Talk of wanting to die  · Neglect of personal appearance   · Rebelliousness- reckless behavior  · Withdrawal from people/activities they love  · Confusion- inability to concentrate     If you or a loved one observes any of these behaviors or has concerns about self-harm, here's what you can do:  · Talk about it- your feelings and reasons for harming yourself  · Remove any means that you might use to hurt yourself (examples: pills, rope, extension cords, firearm)  · Get professional help from the community (Mental Health, Substance Abuse, psychological counseling)  · Do not be alone:Call your Safe Contact- someone whom you trust who will be there for you.  · Call your local CRISIS HOTLINE 083-7375 or 070-158-9371  · Call your local Children's Mobile Crisis Response Team [place] (972) 439-2918 or www.LVL6  · Call the toll free National Suicide Prevention Hotlines   · National Suicide Prevention Lifeline 713-636-NEWJ (0162)  · National Hope Line Network 800-SUICIDE (939-8554)       Medication List      START taking these medications        Instructions    Morning Afternoon Evening Bedtime    oxycodone-acetaminophen  MG Tabs   Commonly known as:  PERCOCET-10        Take 0.5-1 Tabs by mouth every 3 hours.   Dose:  0.5-1 Tab                          CONTINUE taking these medications        Instructions    Morning Afternoon Evening Bedtime    ALEVE 220 MG tablet   Generic drug:  naproxen        Take 220 mg by mouth as needed.   Dose:  220 mg                        buPROPion  MG  Tb12   Commonly known as:  WELLBUTRIN-SR        Take 100 mg by mouth 2 times a day.   Dose:  100 mg                        carbamazepine 200 MG Tabs   Commonly known as:  TEGRETOL        Take 200 mg by mouth every day.   Dose:  200 mg                        gabapentin 300 MG Caps   Last time this was given:  600 mg on 8/18/2017  9:24 AM   Commonly known as:  NEURONTIN        Take 300 mg by mouth 2 Times a Day. Two tabs in the am (600mg) one tab in the evening (300mg)   Dose:  300 mg                        ibuprofen 800 MG Tabs   Commonly known as:  MOTRIN        Take 800 mg by mouth every day.   Dose:  800 mg                        trazodone 150 MG Tabs   Commonly known as:  DESYREL        Take 150 mg by mouth every evening.   Dose:  150 mg                        ziprasidone 60 MG Caps   Commonly known as:  GEODON        Take 60 mg by mouth 2 Times a Day.   Dose:  60 mg                             Where to Get Your Medications      Information about where to get these medications is not yet available     ! Ask your nurse or doctor about these medications    - oxycodone-acetaminophen  MG Tabs            Medication Information     Above and/or attached are the medications your physician expects you to take upon discharge. Review all of your home medications and newly ordered medications with your doctor and/or pharmacist. Follow medication instructions as directed by your doctor and/or pharmacist. Please keep your medication list with you and share with your physician. Update the information when medications are discontinued, doses are changed, or new medications (including over-the-counter products) are added; and carry medication information at all times in the event of emergency situations.        Resources     Quit Smoking / Tobacco Use:    I understand the use of any tobacco products increases my chance of suffering from future heart disease or stroke and could cause other illnesses which may shorten my  life. Quitting the use of tobacco products is the single most important thing I can do to improve my health. For further information on smoking / tobacco cessation call a Toll Free Quit Line at 1-449.596.6012 (*National Cancer [place]) or 1-286.599.9068 (American Lung Association) or you can access the web based program at www.lungusa.org.    [place] Tobacco Users Help Line:  (585) 720-7742       Toll Free: 1-475.927.1424  Quit Tobacco Program [facility] (043)532-8220    Crisis Hotline:    [place] Crisis Hotline:  5-192-FQBJTRW or 1-801.871.5024    [place] Crisis Hotline:    1-986.180.9401 or 940-063-6368    Discharge Survey:   Thank you for choosing [facility]. We hope we did everything we could to make your hospital stay a pleasant one. You may be receiving a survey and we would appreciate your time and participation in answering the questions. Your input is very valuable to us in our efforts to improve our service to our patients and their families.            Signatures     My signature on this form indicates that:    1. I acknowledge receipt and understanding of these Home Care Instruction.  2. My questions regarding this information have been answered to my satisfaction.  3. I have formulated a plan with my discharge nurse to obtain my prescribed medications for home.    __________________________________      __________________________________                   Patient Signature                                 Guardian/Responsible Adult Signature      __________________________________                 __________       ________                       Nurse Signature                                               Date                 Time

## 2017-08-18 NOTE — OR SURGEON
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    Anesthesiologist/Type of Anesthesia:  Anesthesiologist: Sergey Lockett M.D.  Anesthesia Technician: Magdalena Perez/General    Surgical Staff:  Circulator: Madhavi Hernandez R.N.  Scrub Person: López Dubose    Specimens: none    Estimated Blood Loss: minimal    Findings: see dictation    Complications: none known    PLAN:  --d/c to home from PACU  --NWB LLE x 8 weeks  --keep splint clean and dry  --fu 2 weeks postop        8/18/2017 11:39 AM Aguilar Schroeder

## 2017-08-18 NOTE — IP AVS SNAPSHOT
8/18/2017    Jerica Romaine  [address]  [place] [address]    Dear Cortney:    [facility] wants to ensure your discharge home is safe and you or your loved ones have had all of your questions answered regarding your care after you leave the hospital.    Below is a list of resources and contact information should you have any questions regarding your hospital stay, follow-up instructions, or active medical symptoms.    Questions or Concerns Regarding… Contact   Medical Questions Related to Your Discharge  (7 days a week, 8am-5pm) Contact a Nurse Care Coordinator   [phone]   Medical Questions Not Related to Your Discharge  (24 hours a day / 7 days a week)  Contact the Nurse Health Line   [phone]    Medications or Discharge Instructions Refer to your discharge packet   or contact your [facility] Primary Care Provider   [phone]   Follow-up Appointment(s) Schedule your appointment via [employer]   or contact Scheduling [phone]   Billing Review your statement via [employer]  or contact Billing [phone]   Medical Records Review your records via [employer]   or contact Medical Records [phone]     You may receive a telephone call within two days of discharge. This call is to make certain you understand your discharge instructions and have the opportunity to have any questions answered. You can also easily access your medical information, test results and upcoming appointments via the [employer] free online health management tool. You can learn more and sign up at [employer]. For assistance setting up your [employer] account, please call [phone].    Once again, we want to ensure your discharge home is safe and that you have a clear understanding of any next steps in your care. If you have any questions or concerns, please do not hesitate to contact us, we are here for you. Thank you for choosing [facility] for your healthcare needs.    Sincerely,    Your [facility] Healthcare Team

## 2017-08-18 NOTE — DISCHARGE INSTRUCTIONS
ACTIVITY: Rest and take it easy for the first 24 hours.  A responsible adult is recommended to remain with you during that time.  It is normal to feel sleepy.  We encourage you to not do anything that requires balance, judgment or coordination.    MILD FLU-LIKE SYMPTOMS ARE NORMAL. YOU MAY EXPERIENCE GENERALIZED MUSCLE ACHES, THROAT IRRITATION, HEADACHE AND/OR SOME NAUSEA.    FOR 24 HOURS DO NOT:  Drive, operate machinery or run household appliances.  Drink beer or alcoholic beverages.   Make important decisions or sign legal documents.    SPECIAL INSTRUCTIONS:   --okay to discharge to home with crutches and/or walker if needed to maintain non weight bearing left lower extremity   --non weight bearing Left lower extremity in splint   --keep splint clean and dry   --Rx for percocet as needed.  --elevate extremity when not ambulating   --Follow up in 2 weeks postop for staple removal, bring boot for appointment    DIET: To avoid nausea, slowly advance diet as tolerated, avoiding spicy or greasy foods for the first day.  Add more substantial food to your diet according to your physician's instructions.  INCREASE FLUIDS AND FIBER TO AVOID CONSTIPATION.    FOLLOW-UP APPOINTMENT:  A follow-up appointment should be arranged with your doctor in office in 2 weeks; call to schedule.    You should CALL YOUR PHYSICIAN if you develop:  Fever greater than 101 degrees F.  Pain not relieved by medication, or persistent nausea or vomiting.  Excessive bleeding (blood soaking through dressing) or unexpected drainage from the wound.  Extreme redness or swelling around the incision site, drainage of pus or foul smelling drainage.  Inability to urinate or empty your bladder within 8 hours.  Problems with breathing or chest pain.    You should call 911 if you develop problems with breathing or chest pain.  If you are unable to contact your doctor or surgical center, you should go to the nearest emergency room or urgent care center.    [facility]'s telephone #: [phone]    If any questions arise, call your doctor.  If your doctor is not available, please feel free to call the Surgical Center at [phone].  The Center is open Monday through Friday from 7AM to 7PM.  You can also call the HEALTH HOTLINE open 24 hours/day, 7 days/week and speak to a nurse at [phone], or toll free at [phone].    A registered nurse may call you a few days after your surgery to see how you are doing after your procedure.    MEDICATIONS: Resume taking daily medication.  Take prescribed pain medication with food.  If no medication is prescribed, you may take non-aspirin pain medication if needed.  PAIN MEDICATION CAN BE VERY CONSTIPATING.  Take a stool softener or laxative such as senokot, pericolace, or milk of magnesia if needed.    Prescription given for Percocet.  Last pain medication given at none.    If your physician has prescribed pain medication that includes Acetaminophen (Tylenol), do not take additional Acetaminophen (Tylenol) while taking the prescribed medication.    Depression / Suicide Risk    As you are discharged from this [facility] facility, it is important to learn how to keep safe from harming yourself.    Recognize the warning signs:  · Abrupt changes in personality, positive or negative- including increase in energy   · Giving away possessions  · Change in eating patterns- significant weight changes-  positive or negative  · Change in sleeping patterns- unable to sleep or sleeping all the time   · Unwillingness or inability to communicate  · Depression  · Unusual sadness, discouragement and loneliness  · Talk of wanting to die  · Neglect of personal appearance   · Rebelliousness- reckless behavior  · Withdrawal from people/activities they love  · Confusion- inability to concentrate     If you or a loved one observes any of these behaviors or has concerns about self-harm, here's what you can do:  · Talk about it- your feelings and  reasons for harming yourself  · Remove any means that you might use to hurt yourself (examples: pills, rope, extension cords, firearm)  · Get professional help from the community (Mental Health, Substance Abuse, psychological counseling)  · Do not be alone:Call your Safe Contact- someone whom you trust who will be there for you.  · Call your local CRISIS HOTLINE 843-8480 or 881-746-5350  · Call your local Children's Mobile Crisis Response Team [place] (025) 507-2254 or www.FotoSwipe  · Call the toll free National Suicide Prevention Hotlines   · National Suicide Prevention Lifeline 368-305-UNQU (9993)  · National Hope Line Network 800-SUICIDE (389-0102)

## 2017-08-18 NOTE — OR NURSING
"1138- Pt to pacu via gurney with side rails up.  VSS.  Pt arouses to voice, respirations unlabored.  L ankle dressing cdi.  Cap refill brisk to L toes, able to wiggle, sensation decreased.    1150- Pt c/o pain 8/10 to L ankle. See Mar.  1205- demerol given for shivering.  1210-left message on Bianca's pt's roommate's voice mail-ok per pt.  Pt was planning to go home via RTC without a responsible adult with her.  Trying to reach one of pt's roommates to  pt. Dr Meza's aware.  1213- unable to reach Pt's 2nd roommate via phone.  1220- vss.  Pt rates pain 8/10.  Pt calm, resting quietly.  Pt's friend Frank able to  pt, to call when ready. Shivering resolved.  Pt complaining her \"stomach hurts\" but denies nausea.  States \"sometimes my stomach hurts\", does not know cause.  1235- report to April RN.  O2 DC'd, April to monitor RA sat.    "

## 2017-08-18 NOTE — OR NURSING
"1235 Assumed care of patient; pain rated as 10/10 but \"I normally live at a 12 so it's better.\" Pt agrees that her pain is tolerable to go home. Titrating oxygen to RA. Pt denies nausea but reports \"my stomach hurts, it does this at home when I haven't eaten.   1240 Given crackers, fruit cup and juice on request for comfort. Pulse ox 88-90% on RA. Denies nausea.   1255 Instructed to DB/C; demonstrated understanding.   1300 Started on IS x 10 every 2 minutes x 3; IS max 2000. Pain rated as tolerable. Denies nausea; tolerated juice/crackers.   1315 Report to RICH Cooper.   "

## 2017-08-18 NOTE — OR NURSING
Patient demonstrates good skills with crutches NWB operative leg, she states she has used crutches before.

## 2017-08-18 NOTE — OP REPORT
DATE OF SERVICE:  08/18/2017    PREOPERATIVE DIAGNOSES:  1.  Left ankle chronic syndesmosis instability.  2.  Retained deep implants, right distal fibula and right medial malleolus.    POSTOPERATIVE DIAGNOSES:  1.  Left ankle chronic syndesmosis instability.  2.  Retained deep implants, right distal fibula and right medial malleolus.    PROCEDURE PERFORMED:  1.  Open treatment with internal fixation of left chronic ankle syndesmosis   disruption.  2.  Removal of deep implants from left fibula.  3.  Removal of deep implants from left distal tibia (medial malleolus).  4.  Left ankle arthrotomy and debridement.    SURGEON:  Aguialr Schroeder MD    ANESTHESIOLOGIST:  Sergey Lockett MD    ANESTHESIA:  General.    ESTIMATED BLOOD LOSS:  Minimal.    TOURNIQUET TIME:  94 minutes at 250 mmHg to left thigh.    IMPLANTS:  Synthes 2-hole 1/3 tubular nonlocking plate with total of two 3.5   mm cortical screws.    INDICATION FOR PROCEDURE:  Patient is a 47-year-old female.  She had an ankle   fracture treated at an outside facility about 3 years ago and she was told   that she need another procedure, but did not have this done.  She presented to   my clinic about 2 weeks ago and I evaluate her radiographically.  She had   signs consistent with chronic ankle syndesmosis widening and instability with   pain.  I discussed with her treatment options.  We obtained a preoperative CT   scan to confirm the findings evaluate for any other potential abnormalities   contributing to her pain and it was felt to be consistent with this chronic   unstable ankle syndesmosis.  I discussed treatment options with the patient   and she elected to proceed with removal of deep implants and open reduction   and internal fixation of her ankle syndesmosis.  She signed informed consent   preoperatively and wished to proceed as outlined above.    DESCRIPTION OF PROCEDURE:  Patient was met in the preoperative holding area.    Her surgical site was  signed and consent was confirmed for accuracy.  She was   taken back to the operating room and general anesthesia was induced.  Ancef   was administered.  Tourniquet was applied to the left thigh.  The left lower   extremity was then prepped and draped in the usual sterile fashion.  A formal   time-out was performed to confirm patient's correct name, correct surgical   site, correct procedure and correct laterality.  The limb was then   exsanguinated with an Esmarch and the tourniquet was inflated to 250 mmHg.    Her lateral scar was reincised with knife down through skin directly down into   the bone and the plate, which was identified and all screws were removed in   their entirety and the plate was removed as well.  The button for the Arthrex   TightRope was identified at the posterior aspect of the fibula laterally.  I   then made medial incision, extended it more proximally in a curvilinear   fashion and dissected directly down into the medial malleolus.  I performed an   arthrotomy and debrided the medial gutter of fibrous tissue.  I then   identified the medial malleolus screw and removed it in its entirety at the   tip of the medial malleolus and identified the anchor for the Arthrex   TightRope and removed this button and at the fibula, I removed that portion of   it as well as significant portion of the FiberWire associated with the   TightRope.  I then made an anterolateral incision directly over the ankle   syndesmosis with a knife down through skin.  The superficial peroneal nerve   was identified and mobilized.  Extensor retinaculum was incised and the   anterior muscle was mobilized anteriorly.  A sharp dissection was carried down   to the ankle syndesmosis and was debrided of fibrous tissue.  There was gross   instability at the ankle syndesmosis and I did excise some more FiberWire at   the ankle syndesmosis with a pituitary rongeur during the debridement.  I then   used a periarticular reduction  clamp to reduce the ankle syndesmosis and   appeared anatomic through the open incision under direct visualization and   palpation and also during fluoroscopic imaging.  I applied a lateral 2-hole   1/3 tubular nonlocking plate and again reapplied the periarticular clamp   holding the ankle syndesmosis and ankle mortise in good alignment with   reduction and fixed it at the proximal hole with a quadricortical 3.5 mm   nonlocking screw and then removed the clamp and placed the second   quadricortical 3.5 mm screw through the distal hole of the plate.  Final   fluoroscopic imaging confirmed overall acceptable alignment of the fracture   and acceptable position of the implants.  There was some tilt of the talus   likely as a result of the open approach to the medial malleolus, which was   subsequently repaired.  The wounds were thoroughly irrigated with normal   saline.  I repaired the deep structures of the deltoid ligament and periosteum   medially and the medial arthrotomy with interrupted 0 Vicryl and I repaired   the anterior ankle syndesmosis ligaments with interrupted 0 Vicryl.  I   repaired the deep layers with 0 Vicryl, 2-0 Vicryl and skin edges with   staples.  The wound was thoroughly cleansed and dried.  A total of 50 mL of   0.5% Marcaine with epinephrine was injected into her various incisions as well   as in the field block around the ankle for postop analgesia as well as into   the ankle joint.  I then applied a sterile compressive dressing, the   tourniquet was deflated after 94 minutes, she was placed in a well-padded   short leg plaster splint.  She was then awoken from anesthesia, transferred on   the r[place] and taken to the postanesthesia care unit in stable condition.    PLAN:  1.  Patient will be discharged home from the PACU when she recovers from   anesthesia.  2.  She should be nonweightbearing to the left lower extremity in her splint   and I anticipate her to be the need for nonweightbearing  for about 8 weeks   postop.  3.  She will be given a prescription for Percocet as needed for pain.  4.  She will follow up in 2 weeks postop for routine wound check, staple   removal and conversion to a pneumatic boot to start early ankle range of   motion.       ____________________________________     MD ZHENG Moon / KYLEE    DD:  08/18/2017 11:50:26  DT:  08/18/2017 12:38:22    D#:  [id number]  Job#:  [id number]

## 2017-08-18 NOTE — OR NURSING
Respirations easy in PACU. Uses incentive spirometer to 1750 cc. Lungs sound clear. Saturations in 90s during PACU 2 stay. Posterior splint and wrap clean and intact to operative foot. Leg elevated with ice packs. Toes warm and pink with capillary refill less than 3 seconds.  Patient and her friend verbalize good understanding of discharge instructions.

## 2017-08-19 ENCOUNTER — HOSPITAL ENCOUNTER (EMERGENCY)
Facility: MEDICAL CENTER | Age: 47
End: 2017-08-19
Attending: EMERGENCY MEDICINE
Payer: MEDICARE

## 2017-08-19 VITALS
WEIGHT: 246 LBS | HEART RATE: 69 BPM | OXYGEN SATURATION: 98 % | HEIGHT: 68 IN | BODY MASS INDEX: 37.28 KG/M2 | RESPIRATION RATE: 16 BRPM | TEMPERATURE: 98.8 F | SYSTOLIC BLOOD PRESSURE: 124 MMHG | DIASTOLIC BLOOD PRESSURE: 76 MMHG

## 2017-08-19 DIAGNOSIS — G89.18 POST-OPERATIVE PAIN: ICD-10-CM

## 2017-08-19 LAB
ALBUMIN SERPL BCP-MCNC: 3.4 G/DL (ref 3.2–4.9)
ALBUMIN/GLOB SERPL: 1.2 G/DL
ALP SERPL-CCNC: 66 U/L (ref 30–99)
ALT SERPL-CCNC: 15 U/L (ref 2–50)
ANION GAP SERPL CALC-SCNC: 4 MMOL/L (ref 0–11.9)
AST SERPL-CCNC: 17 U/L (ref 12–45)
BASOPHILS # BLD AUTO: 0.3 % (ref 0–1.8)
BASOPHILS # BLD: 0.04 K/UL (ref 0–0.12)
BILIRUB SERPL-MCNC: 0.2 MG/DL (ref 0.1–1.5)
BUN SERPL-MCNC: 8 MG/DL (ref 8–22)
CALCIUM SERPL-MCNC: 8.8 MG/DL (ref 8.5–10.5)
CHLORIDE SERPL-SCNC: 107 MMOL/L (ref 96–112)
CO2 SERPL-SCNC: 29 MMOL/L (ref 20–33)
CREAT SERPL-MCNC: 0.63 MG/DL (ref 0.5–1.4)
EOSINOPHIL # BLD AUTO: 0.02 K/UL (ref 0–0.51)
EOSINOPHIL NFR BLD: 0.1 % (ref 0–6.9)
ERYTHROCYTE [DISTWIDTH] IN BLOOD BY AUTOMATED COUNT: 45.1 FL (ref 35.9–50)
GFR SERPL CREATININE-BSD FRML MDRD: >60 ML/MIN/1.73 M 2
GLOBULIN SER CALC-MCNC: 2.8 G/DL (ref 1.9–3.5)
GLUCOSE SERPL-MCNC: 93 MG/DL (ref 65–99)
HCT VFR BLD AUTO: 38 % (ref 37–47)
HGB BLD-MCNC: 12.4 G/DL (ref 12–16)
IMM GRANULOCYTES # BLD AUTO: 0.05 K/UL (ref 0–0.11)
IMM GRANULOCYTES NFR BLD AUTO: 0.4 % (ref 0–0.9)
LIPASE SERPL-CCNC: <3 U/L (ref 11–82)
LYMPHOCYTES # BLD AUTO: 1.81 K/UL (ref 1–4.8)
LYMPHOCYTES NFR BLD: 13.3 % (ref 22–41)
MCH RBC QN AUTO: 29.7 PG (ref 27–33)
MCHC RBC AUTO-ENTMCNC: 32.6 G/DL (ref 33.6–35)
MCV RBC AUTO: 91.1 FL (ref 81.4–97.8)
MONOCYTES # BLD AUTO: 1.02 K/UL (ref 0–0.85)
MONOCYTES NFR BLD AUTO: 7.5 % (ref 0–13.4)
NEUTROPHILS # BLD AUTO: 10.64 K/UL (ref 2–7.15)
NEUTROPHILS NFR BLD: 78.4 % (ref 44–72)
NRBC # BLD AUTO: 0 K/UL
NRBC BLD AUTO-RTO: 0 /100 WBC
PLATELET # BLD AUTO: 249 K/UL (ref 164–446)
PMV BLD AUTO: 9.6 FL (ref 9–12.9)
POTASSIUM SERPL-SCNC: 3.6 MMOL/L (ref 3.6–5.5)
PROT SERPL-MCNC: 6.2 G/DL (ref 6–8.2)
RBC # BLD AUTO: 4.17 M/UL (ref 4.2–5.4)
SODIUM SERPL-SCNC: 140 MMOL/L (ref 135–145)
WBC # BLD AUTO: 13.6 K/UL (ref 4.8–10.8)

## 2017-08-19 PROCEDURE — 99285 EMERGENCY DEPT VISIT HI MDM: CPT

## 2017-08-19 PROCEDURE — A9270 NON-COVERED ITEM OR SERVICE: HCPCS | Performed by: EMERGENCY MEDICINE

## 2017-08-19 PROCEDURE — 83690 ASSAY OF LIPASE: CPT

## 2017-08-19 PROCEDURE — 700102 HCHG RX REV CODE 250 W/ 637 OVERRIDE(OP): Performed by: EMERGENCY MEDICINE

## 2017-08-19 PROCEDURE — 96374 THER/PROPH/DIAG INJ IV PUSH: CPT

## 2017-08-19 PROCEDURE — 700111 HCHG RX REV CODE 636 W/ 250 OVERRIDE (IP): Performed by: EMERGENCY MEDICINE

## 2017-08-19 PROCEDURE — 80053 COMPREHEN METABOLIC PANEL: CPT

## 2017-08-19 PROCEDURE — 700105 HCHG RX REV CODE 258: Performed by: EMERGENCY MEDICINE

## 2017-08-19 PROCEDURE — 85025 COMPLETE CBC W/AUTO DIFF WBC: CPT

## 2017-08-19 RX ORDER — OXYCODONE HYDROCHLORIDE AND ACETAMINOPHEN 5; 325 MG/1; MG/1
1 TABLET ORAL ONCE
Status: DISCONTINUED | OUTPATIENT
Start: 2017-08-19 | End: 2017-08-19 | Stop reason: HOSPADM

## 2017-08-19 RX ORDER — ONDANSETRON 2 MG/ML
4 INJECTION INTRAMUSCULAR; INTRAVENOUS ONCE
Status: COMPLETED | OUTPATIENT
Start: 2017-08-19 | End: 2017-08-19

## 2017-08-19 RX ORDER — SODIUM CHLORIDE, SODIUM LACTATE, POTASSIUM CHLORIDE, CALCIUM CHLORIDE 600; 310; 30; 20 MG/100ML; MG/100ML; MG/100ML; MG/100ML
1000 INJECTION, SOLUTION INTRAVENOUS ONCE
Status: COMPLETED | OUTPATIENT
Start: 2017-08-19 | End: 2017-08-19

## 2017-08-19 RX ORDER — MORPHINE SULFATE 4 MG/ML
4 INJECTION, SOLUTION INTRAMUSCULAR; INTRAVENOUS ONCE
Status: DISCONTINUED | OUTPATIENT
Start: 2017-08-19 | End: 2017-08-19

## 2017-08-19 RX ADMIN — ONDANSETRON 4 MG: 2 INJECTION INTRAMUSCULAR; INTRAVENOUS at 02:14

## 2017-08-19 RX ADMIN — LIDOCAINE HYDROCHLORIDE 30 ML: 20 SOLUTION OROPHARYNGEAL at 02:14

## 2017-08-19 RX ADMIN — SODIUM CHLORIDE, POTASSIUM CHLORIDE, SODIUM LACTATE AND CALCIUM CHLORIDE 1000 ML: 600; 310; 30; 20 INJECTION, SOLUTION INTRAVENOUS at 02:15

## 2017-08-19 NOTE — ED NOTES
Chief Complaint   Patient presents with   • N/V     BIB EMS, pt had reconstructive surgery on her foot today, the last few hours she has felt nauseas but has not vomited, as well as shaky. She took two percocet at 1900. EMS gave 4mg zofran but pt states that hasn't helped     Pt resting, no distress noted, SP02 88% RA, 2l NC given.

## 2017-08-19 NOTE — ED AVS SNAPSHOT
TouchPal Access Code: [email or web address]  Expires: 8/30/2017 10:42 AM    TouchPal  A secure, online tool to manage your health information     [employer]’s TouchPal® is a secure, online tool that connects you to your personalized health information from the privacy of your home -- day or night - making it very easy for you to manage your healthcare. Once the activation process is completed, you can even access your medical information using the TouchPal shubham, which is available for free in the [employer] Shubham store or Google Play store.     TouchPal provides the following levels of access (as shown below):   My Chart Features   [facility] Primary Care Doctor [facility]  Specialists [facility]  Urgent  Care Non-[facility]  Primary Care  Doctor   Email your healthcare team securely and privately 24/7 X X X X   Manage appointments: schedule your next appointment; view details of past/upcoming appointments X      Request prescription refills. X      View recent personal medical records, including lab and immunizations X X X X   View health record, including health history, allergies, medications X X X X   Read reports about your outpatient visits, procedures, consult and ER notes X X X X   See your discharge summary, which is a recap of your hospital and/or ER visit that includes your diagnosis, lab results, and care plan. X X       How to register for TouchPal:  1. Go to  [email or web address].  2. Click on the Sign Up Now box, which takes you to the New Member Sign Up page. You will need to provide the following information:  a. Enter your TouchPal Access Code exactly as it appears at the top of this page. (You will not need to use this code after you’ve completed the sign-up process. If you do not sign up before the expiration date, you must request a new code.)   b. Enter your date of birth.   c. Enter your home email address.   d. Click Submit, and follow the next screen’s instructions.  3. Create a TouchPal ID. This will be your TouchPal  login ID and cannot be changed, so think of one that is secure and easy to remember.  4. Create a [employer] password. You can change your password at any time.  5. Enter your Password Reset Question and Answer. This can be used at a later time if you forget your password.   6. Enter your e-mail address. This allows you to receive e-mail notifications when new information is available in [employer].  7. Click Sign Up. You can now view your health information.    For assistance activating your [employer] account, call [phone]

## 2017-08-19 NOTE — ED AVS SNAPSHOT
Home Care Instructions                                                                                                                Cortney Gavin   MRN: [id number]    Department:  [facility], Emergency Dept   Date of Visit:  8/19/2017            [facility], Emergency Dept    [address] [address]    Phone:  [phone]      You were seen by     Forest Gaxiola M.D.      Your Diagnosis Was     Post-operative pain     G89.18       These are the medications you received during your hospitalization from 08/19/2017 0045 to 08/19/2017 0314     Date/Time Order Dose Route Action    08/19/2017 0215 LR infusion (bolus) 1,000 mL Intravenous New Bag    08/19/2017 0214 ondansetron (ZOFRAN) syringe/vial injection 4 mg 4 mg Intravenous Given    08/19/2017 0214 hyoscyamine-maalox plus-lidocaine viscous (GI COCKTAIL) oral susp 30 mL 30 mL Oral Given      Follow-up Information     1. Follow up with Aguilar Schroeder M.D..    Specialty:  Orthopaedics    Contact information    [address]  [address]  [facility] [address] [phone]          2. Follow up with [facility], Emergency Dept.    Specialty:  Emergency Medicine    Why:  If symptoms worsen    Contact information    [address] [place] [id number] [phone]      Medication Information     Review all of your home medications and newly ordered medications with your primary doctor and/or pharmacist as soon as possible. Follow medication instructions as directed by your doctor and/or pharmacist.     Please keep your complete medication list with you and share with your physician. Update the information when medications are discontinued, doses are changed, or new medications (including over-the-counter products) are added; and carry medication information at all times in the event of emergency situations.               Medication List      ASK your doctor about these  medications        Instructions    Morning Afternoon Evening Bedtime    ALEVE 220 MG tablet   Generic drug:  naproxen        Take 220 mg by mouth as needed.   Dose:  220 mg                        buPROPion  MG Tb12   Commonly known as:  WELLBUTRIN-SR        Take 100 mg by mouth 2 times a day.   Dose:  100 mg                        carbamazepine 200 MG Tabs   Commonly known as:  TEGRETOL        Take 200 mg by mouth every day.   Dose:  200 mg                        gabapentin 300 MG Caps   Commonly known as:  NEURONTIN        Take 300 mg by mouth 2 Times a Day. Two tabs in the am (600mg) one tab in the evening (300mg)   Dose:  300 mg                        ibuprofen 800 MG Tabs   Commonly known as:  MOTRIN        Take 800 mg by mouth every day.   Dose:  800 mg                        oxycodone-acetaminophen  MG Tabs   Commonly known as:  PERCOCET-10        Take 0.5-1 Tabs by mouth every 3 hours.   Dose:  0.5-1 Tab                        trazodone 150 MG Tabs   Commonly known as:  DESYREL        Take 150 mg by mouth every evening.   Dose:  150 mg                        ziprasidone 60 MG Caps   Commonly known as:  GEODON        Take 60 mg by mouth 2 Times a Day.   Dose:  60 mg                                Procedures and tests performed during your visit     CBC WITH DIFFERENTIAL    COMP METABOLIC PANEL    ESTIMATED GFR    IV Saline Lock    LIPASE        Discharge Instructions       Pain Relief Preoperatively and Postoperatively  If you have questions, problems, or concerns about the pain that you may feel after surgery, let your health care provider know. Patients have the right to assessment and management of pain. Severe pain after surgery--and the fear or anxiety associated with that pain--may cause extreme discomfort that:  · Prevents sleep.  · Decreases the ability to breathe deeply and to cough. This can result in pneumonia or other upper airway infections.  · Causes the heart to beat more quickly and  the blood pressure to be higher.  · Increases the risk for constipation and bloating.  · Decreases the ability of wounds to heal.  · May result in depression, increased anxiety, and feelings of helplessness.  Relieving pain before surgery (preoperatively) is also important because it lessens pain that you have after surgery (postoperatively). Patients who receive pain relief both before and after surgery experience greater pain relief than those who receive pain relief only after surgery. Let your health care provider know if you are having uncontrolled pain. This is very important. Pain after surgery is more difficult to manage if it is severe, so receiving prompt and adequate treatment of acute pain is necessary. If you become constipated after taking pain medicine, drink more liquids if you can. Your health care provider may have you take a mild laxative.  PAIN CONTROL METHODS  Your health care providers follow policies and procedures about the management of your pain. These guidelines should be explained to you before surgery. Plans for pain control after surgery must be decided upon by you and your health care provider and put into use with your full understanding and agreement. Do not be afraid to ask questions about the care that you are receiving.  Your health care providers will attempt to control your pain in various ways, and these methods may be used together (multimodal analgesia). Using this approach has many benefits for you, including being able to eat, move around, and leave the hospital sooner.  As-Needed Pain Control  · You may be given pain medicine through an IV tube or as a pill or liquid that you can swallow. Let your health care provider know when you are having pain, and he or she will give you the pain medicine that is ordered for you.  IV Patient-Controlled Analgesia (PCA) Pump  · You can receive your pain medicine through an IV tube that goes into one of your veins. You can control the  amount of pain medicine that you get. The pain medicine is controlled by a pump. When you push the button that is hooked up to this pump, you receive a specific amount of pain medicine. This button should be pushed only by you or by someone who is specifically assigned by you to do so. It is set up to keep you from accidentally giving yourself too much pain medicine. You will be able to start using your pain pump in the recovery room after your surgery. This method can be helpful for most types of surgery.  · Tell your health care provider:  ¨ If you are having too much pain.  ¨ If you are feeling too sleepy or nauseous.  Continuous Epidural Pain Control  · A thin, soft tube (catheter) is put into your back, outside the outer layer of your spinal cord. Pain medicine flows through the catheter to lessen pain in areas of your body that are below the level of catheter placement. Continuous epidural pain control may work best for you if you are having surgery on your abdomen, hip area, or legs. The epidural catheter is usually put into your back shortly before surgery. It is left in until you can eat, take medicine by mouth, pass urine, and have a bowel movement.  · Giving pain medicine through the epidural catheter may help you to heal more quickly because you can do these things sooner:  ¨ Regain normal bowel and bladder function.  ¨ Return to eating.  ¨ Get up and walk.  Medicine That Numbs the Area (Local Anesthetic)  You may be given pain medicine:  · As an injection near the area of the pain (local infiltration).  · As an injection near the nerve that controls the sensation to a specific part of your body (peripheral nerve block).  · In your spine to block pain (spinal block).  · Through a local anesthetic reservoir pump. If your surgeon or anesthesiologist selects this option as a part of your pain control, one or more thin, soft tubes will be inserted into your incision site(s) at the end of surgery. These tubes  will be connected to a device that is filled with a non-narcotic pain medicine. This medicine gradually empties into your incision site over the next several days. Usually, after all of the medicine is used, your health care provider will remove the tubes and throw away the device.  Opioids  · Moderate to moderately severe acute pain after surgery may respond to opioids. Opioids are narcotic pain medicine. Opioids are often combined with non-narcotic medicines to improve pain relief, lower the risk of side effects, and reduce the chance of addiction.  · If you follow your health care provider's directions about taking opioids and you do not have a history of substance abuse, your risk of becoming addicted is very small. To prevent addiction, opioids are given for short periods of time in careful doses.  Other Methods of Pain Control  · Steroids.  · Physical therapy.  · Heat and cold therapy.  · Compression, such as wrapping an elastic bandage around the area of the pain.  · Massage.     This information is not intended to replace advice given to you by your health care provider. Make sure you discuss any questions you have with your health care provider.     Document Released: 03/09/2004 Document Revised: 01/08/2016 Document Reviewed: 03/13/2012  [employer] Interactive Patient Education ©2016 [employer]            Patient Information     Patient Information    Following emergency treatment: all patient requiring follow-up care must return either to a private physician or a clinic if your condition worsens before you are able to obtain further medical attention, please return to the emergency room.     Billing Information    [facility] we work to make the billing process streamlined for our patients.  Our Representatives are here to answer any questions you may have regarding your hospital bill.  If you have insurance coverage and have supplied your insurance information to us, we will submit a claim to your  insurer on your behalf.  Should you have any questions regarding your bill, we can be reached online or by phone as follows:  Online: You are able pay your bills online or live chat with our representatives about any billing questions you may have. We are here to help Monday - Friday from 8:00am to 7:30pm and 9:00am - 12:00pm on Saturdays.  Please visit [email or web address]  for more information.   Phone:  [phone] or [phone]    Please note that your emergency physician, surgeon, pathologist, radiologist, anesthesiologist, and other specialists are not employed by [facility] and will therefore bill separately for their services.  Please contact them directly for any questions concerning their bills at the numbers below:     Emergency Physician Services:  [phone]  [facility]:  [phone]  [facility]:  [phone]  [facility]:  [phone]    1. Your final bill may vary from the amount quoted upon discharge if all procedures are not complete at that time, or if your doctor has additional procedures of which we are not aware. You will receive an additional bill if you return to the Emergency Department at [facility] for suture removal regardless of the facility of which the sutures were placed.     2. Please arrange for settlement of this account at the emergency registration.    3. All self-pay accounts are due in full at the time of treatment.  If you are unable to meet this obligation then payment is expected within 4-5 days.     4. If you have had radiology studies (CT, X-ray, Ultrasound, MRI), you have received a preliminary result during your emergency department visit. Please contact the radiology department [phone] to receive a copy of your final result. Please discuss the Final result with your primary physician or with the follow up physician provided.     Crisis Hotline:  National Crisis Hotline:   7-484-OQUWHXB or 1-180.661.5306  [place] Crisis Hotline:    1-195.656.3120 or 534-077-2996         ED Discharge Follow Up Questions    1. In order to provide you with very good care, we would like to follow up with a phone call in the next few days.  May we have your permission to contact you?     YES /  NO    2. What is the best phone number to call you? (       )_____-__________    3. What is the best time to call you?      Morning  /  Afternoon  /  Evening                   Patient Signature:  ____________________________________________________________    Date:  ____________________________________________________________      Your appointments     Sep 07, 2017 10:40 AM   Established Patient with Rubi Ruby M.D.   [facility] (--)    [address]  [address]  [place] [address] [phone]           You will be receiving a confirmation call a few days before your appointment from our automated call confirmation system.

## 2017-08-19 NOTE — ED NOTES
Pt discharged home. Assessment complete. Pt ambulates self. VS stable. Pt verbalized discharge instructions. Pt agitated regarding pain medication offered.

## 2017-08-19 NOTE — DISCHARGE INSTRUCTIONS
Pain Relief Preoperatively and Postoperatively  If you have questions, problems, or concerns about the pain that you may feel after surgery, let your health care provider know. Patients have the right to assessment and management of pain. Severe pain after surgery--and the fear or anxiety associated with that pain--may cause extreme discomfort that:  · Prevents sleep.  · Decreases the ability to breathe deeply and to cough. This can result in pneumonia or other upper airway infections.  · Causes the heart to beat more quickly and the blood pressure to be higher.  · Increases the risk for constipation and bloating.  · Decreases the ability of wounds to heal.  · May result in depression, increased anxiety, and feelings of helplessness.  Relieving pain before surgery (preoperatively) is also important because it lessens pain that you have after surgery (postoperatively). Patients who receive pain relief both before and after surgery experience greater pain relief than those who receive pain relief only after surgery. Let your health care provider know if you are having uncontrolled pain. This is very important. Pain after surgery is more difficult to manage if it is severe, so receiving prompt and adequate treatment of acute pain is necessary. If you become constipated after taking pain medicine, drink more liquids if you can. Your health care provider may have you take a mild laxative.  PAIN CONTROL METHODS  Your health care providers follow policies and procedures about the management of your pain. These guidelines should be explained to you before surgery. Plans for pain control after surgery must be decided upon by you and your health care provider and put into use with your full understanding and agreement. Do not be afraid to ask questions about the care that you are receiving.  Your health care providers will attempt to control your pain in various ways, and these methods may be used together (multimodal  analgesia). Using this approach has many benefits for you, including being able to eat, move around, and leave the hospital sooner.  As-Needed Pain Control  · You may be given pain medicine through an IV tube or as a pill or liquid that you can swallow. Let your health care provider know when you are having pain, and he or she will give you the pain medicine that is ordered for you.  IV Patient-Controlled Analgesia (PCA) Pump  · You can receive your pain medicine through an IV tube that goes into one of your veins. You can control the amount of pain medicine that you get. The pain medicine is controlled by a pump. When you push the button that is hooked up to this pump, you receive a specific amount of pain medicine. This button should be pushed only by you or by someone who is specifically assigned by you to do so. It is set up to keep you from accidentally giving yourself too much pain medicine. You will be able to start using your pain pump in the recovery room after your surgery. This method can be helpful for most types of surgery.  · Tell your health care provider:  ¨ If you are having too much pain.  ¨ If you are feeling too sleepy or nauseous.  Continuous Epidural Pain Control  · A thin, soft tube (catheter) is put into your back, outside the outer layer of your spinal cord. Pain medicine flows through the catheter to lessen pain in areas of your body that are below the level of catheter placement. Continuous epidural pain control may work best for you if you are having surgery on your abdomen, hip area, or legs. The epidural catheter is usually put into your back shortly before surgery. It is left in until you can eat, take medicine by mouth, pass urine, and have a bowel movement.  · Giving pain medicine through the epidural catheter may help you to heal more quickly because you can do these things sooner:  ¨ Regain normal bowel and bladder function.  ¨ Return to eating.  ¨ Get up and walk.  Medicine That  Numbs the Area (Local Anesthetic)  You may be given pain medicine:  · As an injection near the area of the pain (local infiltration).  · As an injection near the nerve that controls the sensation to a specific part of your body (peripheral nerve block).  · In your spine to block pain (spinal block).  · Through a local anesthetic reservoir pump. If your surgeon or anesthesiologist selects this option as a part of your pain control, one or more thin, soft tubes will be inserted into your incision site(s) at the end of surgery. These tubes will be connected to a device that is filled with a non-narcotic pain medicine. This medicine gradually empties into your incision site over the next several days. Usually, after all of the medicine is used, your health care provider will remove the tubes and throw away the device.  Opioids  · Moderate to moderately severe acute pain after surgery may respond to opioids. Opioids are narcotic pain medicine. Opioids are often combined with non-narcotic medicines to improve pain relief, lower the risk of side effects, and reduce the chance of addiction.  · If you follow your health care provider's directions about taking opioids and you do not have a history of substance abuse, your risk of becoming addicted is very small. To prevent addiction, opioids are given for short periods of time in careful doses.  Other Methods of Pain Control  · Steroids.  · Physical therapy.  · Heat and cold therapy.  · Compression, such as wrapping an elastic bandage around the area of the pain.  · Massage.     This information is not intended to replace advice given to you by your health care provider. Make sure you discuss any questions you have with your health care provider.     Document Released: 03/09/2004 Document Revised: 01/08/2016 Document Reviewed: 03/13/2012  [employer] Interactive Patient Education ©2016 [employer]

## 2017-08-19 NOTE — ED AVS SNAPSHOT
8/19/2017    Jerica Romaine  [address]  [place] [address]    Dear Cortney:    [facility] wants to ensure your discharge home is safe and you or your loved ones have had all of your questions answered regarding your care after you leave the hospital.    Below is a list of resources and contact information should you have any questions regarding your hospital stay, follow-up instructions, or active medical symptoms.    Questions or Concerns Regarding… Contact   Medical Questions Related to Your Discharge  (7 days a week, 8am-5pm) Contact a Nurse Care Coordinator   [phone]   Medical Questions Not Related to Your Discharge  (24 hours a day / 7 days a week)  Contact the Nurse Health Line   [phone]    Medications or Discharge Instructions Refer to your discharge packet   or contact your [facility] Primary Care Provider   [phone]   Follow-up Appointment(s) Schedule your appointment via [employer]   or contact Scheduling [phone]   Billing Review your statement via [employer]  or contact Billing [phone]   Medical Records Review your records via [employer]   or contact Medical Records [phone]     You may receive a telephone call within two days of discharge. This call is to make certain you understand your discharge instructions and have the opportunity to have any questions answered. You can also easily access your medical information, test results and upcoming appointments via the [employer] free online health management tool. You can learn more and sign up at [employer]. For assistance setting up your [employer] account, please call [phone].    Once again, we want to ensure your discharge home is safe and that you have a clear understanding of any next steps in your care. If you have any questions or concerns, please do not hesitate to contact us, we are here for you. Thank you for choosing [facility] for your healthcare needs.    Sincerely,    Your [facility] Healthcare Team

## 2017-08-19 NOTE — ED PROVIDER NOTES
ED Provider Note    ED Provider Note      Primary care provider: Rubi Ruby M.D.    CHIEF COMPLAINT  Chief Complaint   Patient presents with   • N/V     BIB EMS, pt had reconstructive surgery on her foot today, the last few hours she has felt nauseas but has not vomited, as well as shaky. She took two percocet at 1900. EMS gave 4mg zofran but pt states that hasn't helped       HPI  Cortney Gavin is a 47 y.o. female who presents to the Emergency Department chief complaint nausea vomiting also feels somewhat shaky and having a crampy type abdominal pain. Patient reconstructive surgery of her left foot. She states that she begin to feel this way as medications wore off after surgery. No fevers no chills no blood in vomit no diarrhea no chest pain or shortness of breath. Minor pain in the left lower extremity.    REVIEW OF SYSTEMS  10 systems reviewed and otherwise negative, pertinent positives and negatives listed in the history of present illness.       PAST MEDICAL HISTORY   has a past medical history of Chronic back pain; Schizophrenia (CMS-HCC); Bipolar 1 disorder (CMS-HCC); Back pain; Arthritis; and Seizure (CMS-HCC).    SURGICAL HISTORY   has past surgical history that includes ankle orif (Left, 2015); knee arthrotomy (Bilateral, 1980's); tubal ligation (1990's); hardware removal ortho (Left, 8/18/2017); and ankle orif (Left, 8/18/2017).    SOCIAL HISTORY  Social History   Substance Use Topics   • Smoking status: Never Smoker    • Smokeless tobacco: Never Used   • Alcohol Use: No      History   Drug Use No     Comment: thc=  last used 2/2017, tried once        FAMILY HISTORY  Non-Contributory    CURRENT MEDICATIONS  Home Medications     **Home medications have not yet been reviewed for this encounter**          ALLERGIES  Allergies   Allergen Reactions   • Norco [Apap-Fd&C Yellow #10 [place]-Hydrocodone] Itching       PHYSICAL EXAM  VITAL SIGNS: /76 mmHg  Pulse 83  Temp(Src) 37.1 °C (98.8 °F)  " Resp 16  Ht 1.727 m (5' 8\")  Wt 111.585 kg (246 lb)  BMI 37.41 kg/m2  SpO2 91%  LMP 08/04/2017  Pulse ox interpretation: I interpret this pulse ox as normal.  Constitutional: Alert and oriented x 3, minimal Distress  HEENT: Atraumatic normocephalic, pupils are equal round reactive to light extraocular movements are intact. The nares is clear, external ears are normal, mouth shows moist mucous membranes  Neck: Supple, no JVD no tracheal deviation  Cardiovascular: Regular rate and rhythm no murmur rub or gallop 2+ pulses peripherally x4  Thorax & Lungs: No respiratory distress, no wheezes rales or rhonchi, No chest tenderness.   GI: Soft nontender nondistended positive bowel sounds, no peritoneal signs  Skin: Warm dry no acute rash or lesion  Musculoskeletal: Bilateral upper and right lower extremity unremarkable left lower extremity in splint normal sensation and cap refill in toes  Neurologic: Cranial nerves III through XII are grossly intact, no sensory deficit, no cerebellar dysfunction   Psychiatric: Appropriate affect for situation at this time      DIAGNOSTIC STUDIES / PROCEDURES  LABS  /76 mmHg  Pulse 69  Temp(Src) 37.1 °C (98.8 °F)  Resp 16  Ht 1.727 m (5' 8\")  Wt 111.585 kg (246 lb)  BMI 37.41 kg/m2  SpO2 98%  LMP 08/04/2017    Results for orders placed or performed during the hospital encounter of 08/19/17   CBC WITH DIFFERENTIAL   Result Value Ref Range    WBC 13.6 (H) 4.8 - 10.8 K/uL    RBC 4.17 (L) 4.20 - 5.40 M/uL    Hemoglobin 12.4 12.0 - 16.0 g/dL    Hematocrit 38.0 37.0 - 47.0 %    MCV 91.1 81.4 - 97.8 fL    MCH 29.7 27.0 - 33.0 pg    MCHC 32.6 (L) 33.6 - 35.0 g/dL    RDW 45.1 35.9 - 50.0 fL    Platelet Count 249 164 - 446 K/uL    MPV 9.6 9.0 - 12.9 fL    Neutrophils-Polys 78.40 (H) 44.00 - 72.00 %    Lymphocytes 13.30 (L) 22.00 - 41.00 %    Monocytes 7.50 0.00 - 13.40 %    Eosinophils 0.10 0.00 - 6.90 %    Basophils 0.30 0.00 - 1.80 %    Immature Granulocytes 0.40 0.00 - 0.90 % " "   Nucleated RBC 0.00 /100 WBC    Neutrophils (Absolute) 10.64 (H) 2.00 - 7.15 K/uL    Lymphs (Absolute) 1.81 1.00 - 4.80 K/uL    Monos (Absolute) 1.02 (H) 0.00 - 0.85 K/uL    Eos (Absolute) 0.02 0.00 - 0.51 K/uL    Baso (Absolute) 0.04 0.00 - 0.12 K/uL    Immature Granulocytes (abs) 0.05 0.00 - 0.11 K/uL    NRBC (Absolute) 0.00 K/uL   COMP METABOLIC PANEL   Result Value Ref Range    Sodium 140 135 - 145 mmol/L    Potassium 3.6 3.6 - 5.5 mmol/L    Chloride 107 96 - 112 mmol/L    Co2 29 20 - 33 mmol/L    Anion Gap 4.0 0.0 - 11.9    Glucose 93 65 - 99 mg/dL    Bun 8 8 - 22 mg/dL    Creatinine 0.63 0.50 - 1.40 mg/dL    Calcium 8.8 8.5 - 10.5 mg/dL    AST(SGOT) 17 12 - 45 U/L    ALT(SGPT) 15 2 - 50 U/L    Alkaline Phosphatase 66 30 - 99 U/L    Total Bilirubin 0.2 0.1 - 1.5 mg/dL    Albumin 3.4 3.2 - 4.9 g/dL    Total Protein 6.2 6.0 - 8.2 g/dL    Globulin 2.8 1.9 - 3.5 g/dL    A-G Ratio 1.2 g/dL   LIPASE   Result Value Ref Range    Lipase <3 (L) 11 - 82 U/L   ESTIMATED GFR   Result Value Ref Range    GFR If African American >60 >60 mL/min/1.73 m 2    GFR If Non African American >60 >60 mL/min/1.73 m 2       All labs reviewed by me.          COURSE & MEDICAL DECISION MAKING  Pertinent Labs & Imaging studies reviewed. (See chart for details)        Medical Decision Making: Patient's vital signs are unremarkable labs unremarkable abdominal exam completely benign. Likely nausea and vomiting and induced by anesthesia. She's feeling better here abdominal exam vital signs benign discharged home in stable condition follow-up with primary care or concerns    /76 mmHg  Pulse 69  Temp(Src) 37.1 °C (98.8 °F)  Resp 16  Ht 1.727 m (5' 8\")  Wt 111.585 kg (246 lb)  BMI 37.41 kg/m2  SpO2 98%  LMP 08/04/2017    Aguilar Schroeder M.D.  [address]  [address]  [facility] [address]  [phone]          [facility], Emergency Dept  [address] [facility] [address] [phone]    If symptoms " worsen      FINAL IMPRESSION  1. Nausea and vomiting  2. Postanesthesia nausea  3. Postoperative pain       This dictation has been created using voice recognition software and/or scribes. The accuracy of the dictation is limited by the abilities of the software and the expertise of the scribes. I expect there may be some errors of grammar and possibly content. I made every attempt to manually correct the errors within my dictation. However, errors related to voice recognition software and/or scribes may still exist and should be interpreted within the appropriate context.

## 2017-08-20 ENCOUNTER — HOSPITAL ENCOUNTER (EMERGENCY)
Facility: MEDICAL CENTER | Age: 47
End: 2017-08-20
Attending: EMERGENCY MEDICINE
Payer: MEDICARE

## 2017-08-20 VITALS
OXYGEN SATURATION: 99 % | WEIGHT: 235 LBS | RESPIRATION RATE: 23 BRPM | HEART RATE: 76 BPM | SYSTOLIC BLOOD PRESSURE: 120 MMHG | BODY MASS INDEX: 35.74 KG/M2 | TEMPERATURE: 97 F | DIASTOLIC BLOOD PRESSURE: 73 MMHG

## 2017-08-20 DIAGNOSIS — M25.372 UNSTABLE LEFT ANKLE: ICD-10-CM

## 2017-08-20 DIAGNOSIS — M25.572 CHRONIC PAIN OF LEFT ANKLE: ICD-10-CM

## 2017-08-20 DIAGNOSIS — G89.29 CHRONIC PAIN OF LEFT ANKLE: ICD-10-CM

## 2017-08-20 PROCEDURE — 302874 HCHG BANDAGE ACE 2 OR 3""

## 2017-08-20 PROCEDURE — 93971 EXTREMITY STUDY: CPT

## 2017-08-20 PROCEDURE — 96372 THER/PROPH/DIAG INJ SC/IM: CPT

## 2017-08-20 PROCEDURE — 29515 APPLICATION SHORT LEG SPLINT: CPT

## 2017-08-20 PROCEDURE — 700111 HCHG RX REV CODE 636 W/ 250 OVERRIDE (IP): Performed by: EMERGENCY MEDICINE

## 2017-08-20 PROCEDURE — 99285 EMERGENCY DEPT VISIT HI MDM: CPT

## 2017-08-20 PROCEDURE — 93971 EXTREMITY STUDY: CPT | Mod: 26 | Performed by: SURGERY

## 2017-08-20 PROCEDURE — 302875 HCHG BANDAGE ACE 4 OR 6""

## 2017-08-20 PROCEDURE — 304561 HCHG STAT O2

## 2017-08-20 RX ORDER — ONDANSETRON 4 MG/1
4 TABLET, ORALLY DISINTEGRATING ORAL ONCE
Status: COMPLETED | OUTPATIENT
Start: 2017-08-20 | End: 2017-08-20

## 2017-08-20 RX ORDER — HYDROMORPHONE HYDROCHLORIDE 2 MG/ML
2 INJECTION, SOLUTION INTRAMUSCULAR; INTRAVENOUS; SUBCUTANEOUS ONCE
Status: COMPLETED | OUTPATIENT
Start: 2017-08-20 | End: 2017-08-20

## 2017-08-20 RX ORDER — OXYCODONE HYDROCHLORIDE AND ACETAMINOPHEN 5; 325 MG/1; MG/1
1-2 TABLET ORAL EVERY 4 HOURS PRN
Qty: 20 TAB | Refills: 0 | Status: SHIPPED | OUTPATIENT
Start: 2017-08-20 | End: 2019-08-22

## 2017-08-20 RX ADMIN — HYDROMORPHONE HYDROCHLORIDE 2 MG: 2 INJECTION INTRAMUSCULAR; INTRAVENOUS; SUBCUTANEOUS at 15:46

## 2017-08-20 RX ADMIN — ONDANSETRON 4 MG: 4 TABLET, ORALLY DISINTEGRATING ORAL at 15:46

## 2017-08-20 NOTE — ED AVS SNAPSHOT
Home Care Instructions                                                                                                                Cortney Gavin   MRN: [id number]    Department:  [facility], Emergency Dept   Date of Visit:  8/20/2017            [facility], Emergency Dept    [address]    [place] [address]    Phone:  [phone]      You were seen by     Markus Joel M.D.      Your Diagnosis Was     Unstable left ankle     M25.372       These are the medications you received during your hospitalization from 08/20/2017 1442 to 08/20/2017 1726     Date/Time Order Dose Route Action    08/20/2017 1546 HYDROmorphone (DILAUDID) injection 2 mg 2 mg Intramuscular Given    08/20/2017 1546 ondansetron (ZOFRAN ODT) dispertab 4 mg 4 mg Oral Given      Follow-up Information     1. Follow up with Aguilar Schroeder M.D. In 2 days.    Specialty:  Orthopaedics    Why:  for recheck    Contact information    [address]  [address]  [facility] [address] [phone]        Medication Information     Review all of your home medications and newly ordered medications with your primary doctor and/or pharmacist as soon as possible. Follow medication instructions as directed by your doctor and/or pharmacist.     Please keep your complete medication list with you and share with your physician. Update the information when medications are discontinued, doses are changed, or new medications (including over-the-counter products) are added; and carry medication information at all times in the event of emergency situations.               Medication List      START taking these medications        Instructions    Morning Afternoon Evening Bedtime    oxycodone-acetaminophen 5-325 MG Tabs   Commonly known as:  PERCOCET        Take 1-2 Tabs by mouth every four hours as needed.   Dose:  1-2 Tab                          ASK your doctor about these medications        Instructions    Morning  Afternoon Evening Bedtime    ALEVE 220 MG tablet   Generic drug:  naproxen        Take 220 mg by mouth as needed.   Dose:  220 mg                        buPROPion  MG Tb12   Commonly known as:  WELLBUTRIN-SR        Take 100 mg by mouth 2 times a day.   Dose:  100 mg                        carbamazepine 200 MG Tabs   Commonly known as:  TEGRETOL        Take 200 mg by mouth every day.   Dose:  200 mg                        gabapentin 300 MG Caps   Commonly known as:  NEURONTIN        Take 300 mg by mouth 2 Times a Day. Two tabs in the am (600mg) one tab in the evening (300mg)   Dose:  300 mg                        ibuprofen 800 MG Tabs   Commonly known as:  MOTRIN        Take 800 mg by mouth every day.   Dose:  800 mg                        trazodone 150 MG Tabs   Commonly known as:  DESYREL        Take 150 mg by mouth every evening.   Dose:  150 mg                        ziprasidone 60 MG Caps   Commonly known as:  GEODON        Take 60 mg by mouth 2 Times a Day.   Dose:  60 mg                             Where to Get Your Medications      You can get these medications from any pharmacy     Bring a paper prescription for each of these medications    - oxycodone-acetaminophen 5-325 MG Tabs            Procedures and tests performed during your visit     LE VENOUS DUPLEX (Specify in Comments Left, Right Or Bilateral)        Discharge Instructions       Pain Relief Preoperatively and Postoperatively  If you have questions, problems, or concerns about the pain that you may feel after surgery, let your health care provider know. Patients have the right to assessment and management of pain. Severe pain after surgery--and the fear or anxiety associated with that pain--may cause extreme discomfort that:  · Prevents sleep.  · Decreases the ability to breathe deeply and to cough. This can result in pneumonia or other upper airway infections.  · Causes the heart to beat more quickly and the blood pressure to be  higher.  · Increases the risk for constipation and bloating.  · Decreases the ability of wounds to heal.  · May result in depression, increased anxiety, and feelings of helplessness.  Relieving pain before surgery (preoperatively) is also important because it lessens pain that you have after surgery (postoperatively). Patients who receive pain relief both before and after surgery experience greater pain relief than those who receive pain relief only after surgery. Let your health care provider know if you are having uncontrolled pain. This is very important. Pain after surgery is more difficult to manage if it is severe, so receiving prompt and adequate treatment of acute pain is necessary. If you become constipated after taking pain medicine, drink more liquids if you can. Your health care provider may have you take a mild laxative.  PAIN CONTROL METHODS  Your health care providers follow policies and procedures about the management of your pain. These guidelines should be explained to you before surgery. Plans for pain control after surgery must be decided upon by you and your health care provider and put into use with your full understanding and agreement. Do not be afraid to ask questions about the care that you are receiving.  Your health care providers will attempt to control your pain in various ways, and these methods may be used together (multimodal analgesia). Using this approach has many benefits for you, including being able to eat, move around, and leave the hospital sooner.  As-Needed Pain Control  · You may be given pain medicine through an IV tube or as a pill or liquid that you can swallow. Let your health care provider know when you are having pain, and he or she will give you the pain medicine that is ordered for you.  IV Patient-Controlled Analgesia (PCA) Pump  · You can receive your pain medicine through an IV tube that goes into one of your veins. You can control the amount of pain medicine  that you get. The pain medicine is controlled by a pump. When you push the button that is hooked up to this pump, you receive a specific amount of pain medicine. This button should be pushed only by you or by someone who is specifically assigned by you to do so. It is set up to keep you from accidentally giving yourself too much pain medicine. You will be able to start using your pain pump in the recovery room after your surgery. This method can be helpful for most types of surgery.  · Tell your health care provider:  ¨ If you are having too much pain.  ¨ If you are feeling too sleepy or nauseous.  Continuous Epidural Pain Control  · A thin, soft tube (catheter) is put into your back, outside the outer layer of your spinal cord. Pain medicine flows through the catheter to lessen pain in areas of your body that are below the level of catheter placement. Continuous epidural pain control may work best for you if you are having surgery on your abdomen, hip area, or legs. The epidural catheter is usually put into your back shortly before surgery. It is left in until you can eat, take medicine by mouth, pass urine, and have a bowel movement.  · Giving pain medicine through the epidural catheter may help you to heal more quickly because you can do these things sooner:  ¨ Regain normal bowel and bladder function.  ¨ Return to eating.  ¨ Get up and walk.  Medicine That Numbs the Area (Local Anesthetic)  You may be given pain medicine:  · As an injection near the area of the pain (local infiltration).  · As an injection near the nerve that controls the sensation to a specific part of your body (peripheral nerve block).  · In your spine to block pain (spinal block).  · Through a local anesthetic reservoir pump. If your surgeon or anesthesiologist selects this option as a part of your pain control, one or more thin, soft tubes will be inserted into your incision site(s) at the end of surgery. These tubes will be connected to a  device that is filled with a non-narcotic pain medicine. This medicine gradually empties into your incision site over the next several days. Usually, after all of the medicine is used, your health care provider will remove the tubes and throw away the device.  Opioids  · Moderate to moderately severe acute pain after surgery may respond to opioids. Opioids are narcotic pain medicine. Opioids are often combined with non-narcotic medicines to improve pain relief, lower the risk of side effects, and reduce the chance of addiction.  · If you follow your health care provider's directions about taking opioids and you do not have a history of substance abuse, your risk of becoming addicted is very small. To prevent addiction, opioids are given for short periods of time in careful doses.  Other Methods of Pain Control  · Steroids.  · Physical therapy.  · Heat and cold therapy.  · Compression, such as wrapping an elastic bandage around the area of the pain.  · Massage.     This information is not intended to replace advice given to you by your health care provider. Make sure you discuss any questions you have with your health care provider.     Document Released: 03/09/2004 Document Revised: 01/08/2016 Document Reviewed: 03/13/2012  [employer] Patient Education ©2016 [employer]            Patient Information     Patient Information    Following emergency treatment: all patient requiring follow-up care must return either to a private physician or a clinic if your condition worsens before you are able to obtain further medical attention, please return to the emergency room.     Billing Information    At [facility], we work to make the billing process streamlined for our patients.  Our Representatives are here to answer any questions you may have regarding your hospital bill.  If you have insurance coverage and have supplied your insurance information to us, we will submit a claim to your insurer on your behalf.   Should you have any questions regarding your bill, we can be reached online or by phone as follows:  Online: You are able pay your bills online or live chat with our representatives about any billing questions you may have. We are here to help Monday - Friday from 8:00am to 7:30pm and 9:00am - 12:00pm on Saturdays.  Please visit [email or web address]  for more information.   Phone:  [phone] or [phone]    Please note that your emergency physician, surgeon, pathologist, radiologist, anesthesiologist, and other specialists are not employed by [facility] and will therefore bill separately for their services.  Please contact them directly for any questions concerning their bills at the numbers below:     Emergency Physician Services:  [phone]  [facility]:  [phone]  [facility]:  [phone]  [facility]:  [phone]    1. Your final bill may vary from the amount quoted upon discharge if all procedures are not complete at that time, or if your doctor has additional procedures of which we are not aware. You will receive an additional bill if you return to the Emergency Department at [facility] for suture removal regardless of the facility of which the sutures were placed.     2. Please arrange for settlement of this account at the emergency registration.    3. All self-pay accounts are due in full at the time of treatment.  If you are unable to meet this obligation then payment is expected within 4-5 days.     4. If you have had radiology studies (CT, X-ray, Ultrasound, MRI), you have received a preliminary result during your emergency department visit. Please contact the radiology department [phone] to receive a copy of your final result. Please discuss the Final result with your primary physician or with the follow up physician provided.     Crisis Hotline:  National Crisis Hotline:  8-926-ZGWTCVL or  1-965.808.8147  [place] Crisis Hotline:    1-162.261.2454 or 421-252-8497         ED Discharge Follow Up Questions    1. In order to provide you with very good care, we would like to follow up with a phone call in the next few days.  May we have your permission to contact you?     YES /  NO    2. What is the best phone number to call you? (       )_____-__________    3. What is the best time to call you?      Morning  /  Afternoon  /  Evening                   Patient Signature:  ____________________________________________________________    Date:  ____________________________________________________________      Your appointments     Sep 07, 2017 10:40 AM   Established Patient with Rubi Ruby M.D.   [facility] (--)    [address]  [address]  [place] [id number] [phone]           You will be receiving a confirmation call a few days before your appointment from our automated call confirmation system.

## 2017-08-20 NOTE — ED AVS SNAPSHOT
[employer] Access Code: [email or web address]  Expires: 8/30/2017 10:42 AM    [employer]  A secure, online tool to manage your health information     [employer]’s [employer]® is a secure, online tool that connects you to your personalized health information from the privacy of your home -- day or night - making it very easy for you to manage your healthcare. Once the activation process is completed, you can even access your medical information using the [employer] shubham, which is available for free in the [employer] Shubham store or Google Play store.     [employer] provides the following levels of access (as shown below):   My Chart Features   [facility] Primary Care Doctor [facility]  Specialists [facility]  Urgent  Care Non-[facility]  Primary Care  Doctor   Email your healthcare team securely and privately 24/7 X X X X   Manage appointments: schedule your next appointment; view details of past/upcoming appointments X      Request prescription refills. X      View recent personal medical records, including lab and immunizations X X X X   View health record, including health history, allergies, medications X X X X   Read reports about your outpatient visits, procedures, consult and ER notes X X X X   See your discharge summary, which is a recap of your hospital and/or ER visit that includes your diagnosis, lab results, and care plan. X X       How to register for [employer]:  1. Go to  [email or web address].  2. Click on the Sign Up Now box, which takes you to the New Member Sign Up page. You will need to provide the following information:  a. Enter your [employer] Access Code exactly as it appears at the top of this page. (You will not need to use this code after you’ve completed the sign-up process. If you do not sign up before the expiration date, you must request a new code.)   b. Enter your date of birth.   c. Enter your home email address.   d. Click Submit, and follow the next screen’s instructions.  3. Create a [employer] ID. This will be your [employer]  login ID and cannot be changed, so think of one that is secure and easy to remember.  4. Create a [email or web address] password. You can change your password at any time.  5. Enter your Password Reset Question and Answer. This can be used at a later time if you forget your password.   6. Enter your e-mail address. This allows you to receive e-mail notifications when new information is available in [email or web address].  7. Click Sign Up. You can now view your health information.    For assistance activating your [email or web address] account, call [phone]

## 2017-08-20 NOTE — ED NOTES
Chief Complaint   Patient presents with   • Post-Op Pain     pt states that she had surgery on L ankle x2 days ago, states that she started having pain started today, states that she has been taking percocet but has not been taking motrin, pt A&O x4, NAD.

## 2017-08-20 NOTE — DISCHARGE PLANNING
Per Dr. BURKETT pt is interested in HH, not sure if she will qualify but choice form signed and faxed to [facility].

## 2017-08-20 NOTE — ED PROVIDER NOTES
ED Provider Note    CHIEF COMPLAINT  Chief Complaint   Patient presents with   • Post-Op Pain     pt states that she had surgery on L ankle x2 days ago, states that she started having pain started today, states that she has been taking percocet but has not been taking motrin, pt A&O x4, NAD.       HPI  Cortney Gavin is a 47 y.o. female who presents for evaluation of pain in the left foot behind the calf. The patient just recently underwent the following procedures with Dr. Schroeder:  PREOPERATIVE DIAGNOSES:  1.  Left ankle chronic syndesmosis instability.  2.  Retained deep implants, right distal fibula and right medial malleolus.     POSTOPERATIVE DIAGNOSES:  1.  Left ankle chronic syndesmosis instability.  2.  Retained deep implants, right distal fibula and right medial malleolus.     PROCEDURE PERFORMED:  1.  Open treatment with internal fixation of left chronic ankle syndesmosis    disruption.  2.  Removal of deep implants from left fibula.  3.  Removal of deep implants from left distal tibia (medial malleolus).  4.  Left ankle arthrotomy and debridement.    She also reports some pain in the back of the calf. She was discharged with [facility] for stat it is not sufficient for her pain. She also feels like the splint is too tight.    REVIEW OF SYSTEMS  See HPI for further details. All other systems are negative.     PAST MEDICAL HISTORY  Past Medical History   Diagnosis Date   • Chronic back pain    • Schizophrenia (CMS-[facility])    • Bipolar 1 disorder (CMS-[facility])    • Back pain    • Arthritis    • Seizure (CMS-[facility])      as an infant       FAMILY HISTORY  Noncontributory    SOCIAL HISTORY  Social History     Social History   • Marital Status:      Spouse Name: N/A   • Number of Children: N/A   • Years of Education: N/A     Social History Main Topics   • Smoking status: Never Smoker    • Smokeless tobacco: Never Used   • Alcohol Use: No   • Drug Use: No      Comment: thc=  last used 2/2017, tried once    •  Sexual Activity: No     Other Topics Concern   • Not on file     Social History Narrative    nonsmoker no IV drugs    SURGICAL HISTORY  Past Surgical History   Procedure Laterality Date   • Ankle orif Left 2015   • Knee arthrotomy Bilateral 1980's   • Tubal ligation  1990's   • Hardware removal ortho Left 8/18/2017     Procedure: HARDWARE REMOVAL ORTHO - ANKLE;  Surgeon: Aguilar Schroeder M.D.;  Location: SURGERY [facility];  Service:    • Ankle orif Left 8/18/2017     Procedure: ANKLE ORIF - SYNDESMOSIS INJURY ;  Surgeon: Aguilar Schroeder M.D.;  Location: SURGERY [facility];  Service:        CURRENT MEDICATIONS  No current facility-administered medications for this encounter.    Current outpatient prescriptions:   •  oxycodone-acetaminophen (PERCOCET-10)  MG Tab, Take 0.5-1 Tabs by mouth every 3 hours., Disp: 60 Tab, Rfl: 0  •  naproxen (ALEVE) 220 MG tablet, Take 220 mg by mouth as needed., Disp: , Rfl:   •  trazodone (DESYREL) 150 MG Tab, Take 150 mg by mouth every evening., Disp: , Rfl:   •  ziprasidone (GEODON) 60 MG Cap, Take 60 mg by mouth 2 Times a Day., Disp: , Rfl:   •  buPROPion SR (WELLBUTRIN-SR) 100 MG TABLET SR 12 HR, Take 100 mg by mouth 2 times a day., Disp: , Rfl:   •  gabapentin (NEURONTIN) 300 MG Cap, Take 300 mg by mouth 2 Times a Day. Two tabs in the am (600mg) one tab in the evening (300mg), Disp: , Rfl:   •  ibuprofen (MOTRIN) 800 MG Tab, Take 800 mg by mouth every day., Disp: , Rfl:   •  carbamazepine (TEGRETOL) 200 MG Tab, Take 200 mg by mouth every day., Disp: , Rfl:       ALLERGIES  Allergies   Allergen Reactions   • Norco [Apap-Fd&C Yellow #10 [place]-Hydrocodone] Itching       PHYSICAL EXAM  VITAL SIGNS: /73 mmHg  Pulse 91  Temp(Src) 36.1 °C (97 °F)  Resp 16  Wt 106.595 kg (235 lb)  LMP 08/04/2017 Room air O2: 95    Constitutional: Well developed, Well nourished, No acute distress, Non-toxic appearance.   HENT: Normocephalic, Atraumatic, Bilateral external  ears normal, Oropharynx moist, No oral exudates, Nose normal.   Eyes: PERRLA, EOMI, Conjunctiva normal, No discharge.   Neck: Normal range of motion, No tenderness, Supple, No stridor.   Cardiovascular: Normal heart rate, Normal rhythm, No murmurs, No rubs, No gallops.   Thorax & Lungs: Normal breath sounds, No respiratory distress, No wheezing, No chest tenderness.   Abdomen: Bowel sounds normal, Soft, No tenderness, No masses, No pulsatile masses.   Skin: Warm, Dry, No erythema, No rash.   Extremities: Left lower extremity has a stirrup splint. We took the splint down, the anterior surgical incisions are clean dry and intact no erythema and no pus capillary refill is normal mild pain on the left calf no swelling noted  Neurologic: Alert & oriented x 3, Normal motor function, Normal sensory function, No focal deficits noted.   Psychiatric: Anxious        RADIOLOGY/PROCEDURES  LE VENOUS DUPLEX (Specify in Comments Left, Right Or Bilateral)   Preliminary Result               Vascular Laboratory   CONCLUSIONS       KYLE KWON     Exam Date:     2017 15:52     Room #:     Inpatient     Priority:     Stat     Ht (in):     69      Wt (lb):     235     Ordering Physician:        JOEY CORRALES     Referring Physician:       [id number]     Sonographer:               Ingrid Luna RDCS, MARIELAT     Study Type:                Complete Unilateral     Technical Quality:         Adequate     Age:    47    Gender:     F     MRN:    [id number]     :    1970      BSA:    2.21     Indications:     Swelling of Limb     CPT Codes:       78727     ICD Codes:       729.81     History:         Left lower extremity swelling     Limitations:     TDS due to obesity     PROCEDURES:   Left lower extremity venous duplex imaging.    The following venous structures were evaluated: common femoral, profunda    femoral, greater saphenous, femoral, popliteal , peroneal and posterior    tibial  veins.    Serial compression, augmentation maneuvers,  color and spectral Doppler    flow evaluations were performed.      FINDINGS:   Left lower extremity -.    Complete color filling and compressibility with normal venous flow dynamics    including spontaneous flow, response to augmentation maneuvers, and    respiratory phasicity.    No superficial or deep venous thrombosis.     COURSE & MEDICAL DECISION MAKING  Pertinent Labs & Imaging studies reviewed. (See chart for details)  Patient had no evidence of DVT. She is given intramuscular nausea and pain medication. I see no evidence of infection and her incisions are clean dry and intact. Her splint was slightly tight. We removed it and will apply a slightly looser splint. The patient did require submental oxygen for a brief period of time after she received pain medicine. The patient did request home health referral and we made a consult    FINAL IMPRESSION  1. Postoperative pain  2. Encounter for splint management      Electronically signed by: Markus Joel, 8/20/2017 3:11 PM

## 2017-08-20 NOTE — ED AVS SNAPSHOT
8/20/2017    Jerica Romaine  [address]  [place] [address]    Dear Cortney:    [facility] wants to ensure your discharge home is safe and you or your loved ones have had all of your questions answered regarding your care after you leave the hospital.    Below is a list of resources and contact information should you have any questions regarding your hospital stay, follow-up instructions, or active medical symptoms.    Questions or Concerns Regarding… Contact   Medical Questions Related to Your Discharge  (7 days a week, 8am-5pm) Contact a Nurse Care Coordinator   [phone]   Medical Questions Not Related to Your Discharge  (24 hours a day / 7 days a week)  Contact the Nurse Health Line   [phone]    Medications or Discharge Instructions Refer to your discharge packet   or contact your [facility] Primary Care Provider   [phone]   Follow-up Appointment(s) Schedule your appointment via [employer]   or contact Scheduling [phone]   Billing Review your statement via [employer]  or contact Billing [phone]   Medical Records Review your records via [employer]   or contact Medical Records [phone]     You may receive a telephone call within two days of discharge. This call is to make certain you understand your discharge instructions and have the opportunity to have any questions answered. You can also easily access your medical information, test results and upcoming appointments via the [employer] free online health management tool. You can learn more and sign up at [employer]. For assistance setting up your [employer] account, please call [phone].    Once again, we want to ensure your discharge home is safe and that you have a clear understanding of any next steps in your care. If you have any questions or concerns, please do not hesitate to contact us, we are here for you. Thank you for choosing [facility] for your healthcare needs.    Sincerely,    Your [facility] Healthcare Team

## 2017-08-20 NOTE — DISCHARGE INSTRUCTIONS
Pain Relief Preoperatively and Postoperatively  If you have questions, problems, or concerns about the pain that you may feel after surgery, let your health care provider know. Patients have the right to assessment and management of pain. Severe pain after surgery--and the fear or anxiety associated with that pain--may cause extreme discomfort that:  · Prevents sleep.  · Decreases the ability to breathe deeply and to cough. This can result in pneumonia or other upper airway infections.  · Causes the heart to beat more quickly and the blood pressure to be higher.  · Increases the risk for constipation and bloating.  · Decreases the ability of wounds to heal.  · May result in depression, increased anxiety, and feelings of helplessness.  Relieving pain before surgery (preoperatively) is also important because it lessens pain that you have after surgery (postoperatively). Patients who receive pain relief both before and after surgery experience greater pain relief than those who receive pain relief only after surgery. Let your health care provider know if you are having uncontrolled pain. This is very important. Pain after surgery is more difficult to manage if it is severe, so receiving prompt and adequate treatment of acute pain is necessary. If you become constipated after taking pain medicine, drink more liquids if you can. Your health care provider may have you take a mild laxative.  PAIN CONTROL METHODS  Your health care providers follow policies and procedures about the management of your pain. These guidelines should be explained to you before surgery. Plans for pain control after surgery must be decided upon by you and your health care provider and put into use with your full understanding and agreement. Do not be afraid to ask questions about the care that you are receiving.  Your health care providers will attempt to control your pain in various ways, and these methods may be used together (multimodal  analgesia). Using this approach has many benefits for you, including being able to eat, move around, and leave the hospital sooner.  As-Needed Pain Control  · You may be given pain medicine through an IV tube or as a pill or liquid that you can swallow. Let your health care provider know when you are having pain, and he or she will give you the pain medicine that is ordered for you.  IV Patient-Controlled Analgesia (PCA) Pump  · You can receive your pain medicine through an IV tube that goes into one of your veins. You can control the amount of pain medicine that you get. The pain medicine is controlled by a pump. When you push the button that is hooked up to this pump, you receive a specific amount of pain medicine. This button should be pushed only by you or by someone who is specifically assigned by you to do so. It is set up to keep you from accidentally giving yourself too much pain medicine. You will be able to start using your pain pump in the recovery room after your surgery. This method can be helpful for most types of surgery.  · Tell your health care provider:  ¨ If you are having too much pain.  ¨ If you are feeling too sleepy or nauseous.  Continuous Epidural Pain Control  · A thin, soft tube (catheter) is put into your back, outside the outer layer of your spinal cord. Pain medicine flows through the catheter to lessen pain in areas of your body that are below the level of catheter placement. Continuous epidural pain control may work best for you if you are having surgery on your abdomen, hip area, or legs. The epidural catheter is usually put into your back shortly before surgery. It is left in until you can eat, take medicine by mouth, pass urine, and have a bowel movement.  · Giving pain medicine through the epidural catheter may help you to heal more quickly because you can do these things sooner:  ¨ Regain normal bowel and bladder function.  ¨ Return to eating.  ¨ Get up and walk.  Medicine That  Numbs the Area (Local Anesthetic)  You may be given pain medicine:  · As an injection near the area of the pain (local infiltration).  · As an injection near the nerve that controls the sensation to a specific part of your body (peripheral nerve block).  · In your spine to block pain (spinal block).  · Through a local anesthetic reservoir pump. If your surgeon or anesthesiologist selects this option as a part of your pain control, one or more thin, soft tubes will be inserted into your incision site(s) at the end of surgery. These tubes will be connected to a device that is filled with a non-narcotic pain medicine. This medicine gradually empties into your incision site over the next several days. Usually, after all of the medicine is used, your health care provider will remove the tubes and throw away the device.  Opioids  · Moderate to moderately severe acute pain after surgery may respond to opioids. Opioids are narcotic pain medicine. Opioids are often combined with non-narcotic medicines to improve pain relief, lower the risk of side effects, and reduce the chance of addiction.  · If you follow your health care provider's directions about taking opioids and you do not have a history of substance abuse, your risk of becoming addicted is very small. To prevent addiction, opioids are given for short periods of time in careful doses.  Other Methods of Pain Control  · Steroids.  · Physical therapy.  · Heat and cold therapy.  · Compression, such as wrapping an elastic bandage around the area of the pain.  · Massage.     This information is not intended to replace advice given to you by your health care provider. Make sure you discuss any questions you have with your health care provider.     Document Released: 03/09/2004 Document Revised: 01/08/2016 Document Reviewed: 03/13/2012  [employer] Interactive Patient Education ©2016 [employer]

## 2017-08-20 NOTE — ED NOTES
"Upon arrival to room, pt with splint in place to LLE, CMS intact. Pt made statement, \"I wish I could get something stronger then Norco\".  "

## 2017-08-21 ENCOUNTER — PATIENT OUTREACH (OUTPATIENT)
Dept: HEALTH INFORMATION MANAGEMENT | Facility: OTHER | Age: 47
End: 2017-08-21

## 2017-08-21 NOTE — ED NOTES
Patient given discharge teaching and education. Verbalizes understanding. Given chance to ask questions, all questions answered. Educated patient of signs and symptoms to returned to ER, and educated patient they can return for any concerning symptoms. 1 prescriptions provided to patient. Education given to patient about medications.Patient states they have their belongings. Denies additional needs at this time. Reports pain is well controlled. Patient monitored every hour and PRN for safety and comfort. Patient out of department via wheel chair.

## 2017-09-01 ENCOUNTER — HOME HEALTH ADMISSION (OUTPATIENT)
Dept: HOME HEALTH SERVICES | Facility: HOME HEALTHCARE | Age: 47
End: 2017-09-01
Payer: MEDICARE

## 2017-09-01 ENCOUNTER — OFFICE VISIT (OUTPATIENT)
Dept: MEDICAL GROUP | Facility: PHYSICIAN GROUP | Age: 47
End: 2017-09-01
Payer: MEDICARE

## 2017-09-01 VITALS
OXYGEN SATURATION: 95 % | SYSTOLIC BLOOD PRESSURE: 98 MMHG | HEIGHT: 69 IN | HEART RATE: 75 BPM | RESPIRATION RATE: 16 BRPM | TEMPERATURE: 97.7 F | WEIGHT: 252 LBS | DIASTOLIC BLOOD PRESSURE: 68 MMHG | BODY MASS INDEX: 37.33 KG/M2

## 2017-09-01 DIAGNOSIS — G89.29 CHRONIC BILATERAL THORACIC BACK PAIN: ICD-10-CM

## 2017-09-01 DIAGNOSIS — M25.561 CHRONIC PAIN OF BOTH KNEES: ICD-10-CM

## 2017-09-01 DIAGNOSIS — E66.9 OBESITY (BMI 30-39.9): ICD-10-CM

## 2017-09-01 DIAGNOSIS — M25.562 CHRONIC PAIN OF BOTH KNEES: ICD-10-CM

## 2017-09-01 DIAGNOSIS — F31.9 BIPOLAR 1 DISORDER (HCC): ICD-10-CM

## 2017-09-01 DIAGNOSIS — F51.01 PRIMARY INSOMNIA: ICD-10-CM

## 2017-09-01 DIAGNOSIS — M25.572 CHRONIC PAIN OF LEFT ANKLE: ICD-10-CM

## 2017-09-01 DIAGNOSIS — G89.29 CHRONIC PAIN OF LEFT ANKLE: ICD-10-CM

## 2017-09-01 DIAGNOSIS — G89.29 CHRONIC PAIN OF BOTH KNEES: ICD-10-CM

## 2017-09-01 DIAGNOSIS — M54.6 CHRONIC BILATERAL THORACIC BACK PAIN: ICD-10-CM

## 2017-09-01 DIAGNOSIS — Z23 NEED FOR VACCINATION: ICD-10-CM

## 2017-09-01 DIAGNOSIS — M25.551 PAIN OF RIGHT HIP JOINT: ICD-10-CM

## 2017-09-01 PROBLEM — M25.372 UNSTABLE LEFT ANKLE: Status: RESOLVED | Noted: 2017-08-18 | Resolved: 2017-09-01

## 2017-09-01 PROBLEM — D64.9 NORMOCYTIC ANEMIA: Status: RESOLVED | Noted: 2017-07-26 | Resolved: 2017-09-01

## 2017-09-01 PROCEDURE — 99214 OFFICE O/P EST MOD 30 MIN: CPT | Mod: 25 | Performed by: INTERNAL MEDICINE

## 2017-09-01 PROCEDURE — G0008 ADMIN INFLUENZA VIRUS VAC: HCPCS | Performed by: INTERNAL MEDICINE

## 2017-09-01 PROCEDURE — 90686 IIV4 VACC NO PRSV 0.5 ML IM: CPT | Performed by: INTERNAL MEDICINE

## 2017-09-01 RX ORDER — SERTRALINE HYDROCHLORIDE 100 MG/1
100 TABLET, FILM COATED ORAL DAILY
COMMUNITY
End: 2021-06-04

## 2017-09-01 ASSESSMENT — PATIENT HEALTH QUESTIONNAIRE - PHQ9: CLINICAL INTERPRETATION OF PHQ2 SCORE: 0

## 2017-09-01 NOTE — PROGRESS NOTES
Subjective:   Cortney Gavin is a 47 y.o. female here today for     Chronic bilateral thoracic back pain  Chronic back pain for a long time. She had refused surgery in the past. She tells me that she needs pain meds to function. She was not happy with one of pain management, she would like to follow up with them. She has a referral    Bipolar 1 disorder (CMS-HCC)  Her psychiatry had stopped Tegretol and Geodon. She is not maniacal, she is not suicidal, doing well     Primary insomnia  Use trazodone nightly    Chronic pain of left ankle  She had a surgery 08/18/2017 for hardware removal. She is recovering well. She has a boot on. She was recommended to avoid weight baring. She is on percocet for pain., she is taking only if pain is very excruciating. Not able to examine due to boot        Current medicines (including changes today)  Current Outpatient Prescriptions   Medication Sig Dispense Refill   • sertraline (ZOLOFT) 25 MG tablet Take 25 mg by mouth every day.     • oxycodone-acetaminophen (PERCOCET) 5-325 MG Tab Take 1-2 Tabs by mouth every four hours as needed. 20 Tab 0   • trazodone (DESYREL) 150 MG Tab Take 150 mg by mouth every evening.     • buPROPion SR (WELLBUTRIN-SR) 100 MG TABLET SR 12 HR Take 100 mg by mouth 2 times a day.     • gabapentin (NEURONTIN) 300 MG Cap Take 300 mg by mouth 2 Times a Day. Two tabs in the am (600mg) one tab in the evening (300mg)     • ibuprofen (MOTRIN) 800 MG Tab Take 800 mg by mouth every day.       No current facility-administered medications for this visit.      She  has a past medical history of Arthritis; Back pain; Bipolar 1 disorder (CMS-HCC); Chronic back pain; Schizophrenia (CMS-HCC); and Seizure (CMS-HCC).    Current Outpatient Prescriptions   Medication Sig Dispense Refill   • sertraline (ZOLOFT) 25 MG tablet Take 25 mg by mouth every day.     • oxycodone-acetaminophen (PERCOCET) 5-325 MG Tab Take 1-2 Tabs by mouth every four hours as needed. 20 Tab 0   •  "trazodone (DESYREL) 150 MG Tab Take 150 mg by mouth every evening.     • buPROPion SR (WELLBUTRIN-SR) 100 MG TABLET SR 12 HR Take 100 mg by mouth 2 times a day.     • gabapentin (NEURONTIN) 300 MG Cap Take 300 mg by mouth 2 Times a Day. Two tabs in the am (600mg) one tab in the evening (300mg)     • ibuprofen (MOTRIN) 800 MG Tab Take 800 mg by mouth every day.       No current facility-administered medications for this visit.        Allergies as of 09/01/2017 - Reviewed 09/01/2017   Allergen Reaction Noted   • Norco [apap-fd&c yellow #10 [place]-hydrocodone] Itching 06/07/2017       Social History     Social History   • Marital status:      Spouse name: N/A   • Number of children: N/A   • Years of education: N/A     Occupational History   • Not on file.     Social History Main Topics   • Smoking status: Never Smoker   • Smokeless tobacco: Never Used   • Alcohol use No   • Drug use: No      Comment: thc=  last used 2/2017, tried once    • Sexual activity: No     Other Topics Concern   • Not on file     Social History Narrative   • No narrative on file        Family History   Problem Relation Age of Onset   • Heart Disease Mother      a fib   • Heart Disease Maternal Aunt      chf   • Cancer Maternal Grandfather      lung cancer, smoker   • Diabetes Neg Hx        Past Surgical History:   Procedure Laterality Date   • HARDWARE REMOVAL ORTHO Left 8/18/2017    Procedure: HARDWARE REMOVAL ORTHO - ANKLE;  Surgeon: Aguilar Schroeder M.D.;  Location: SURGERY [facility];  Service:    • ANKLE ORIF Left 8/18/2017    Procedure: ANKLE ORIF - SYNDESMOSIS INJURY ;  Surgeon: Aguilar Schroeder M.D.;  Location: SURGERY [facility];  Service:    • ANKLE ORIF Left 2015   • TUBAL LIGATION  1990's   • KNEE ARTHROTOMY Bilateral 1980's       ROS   All systems reviewed are negative except for HPI         Objective:     Blood pressure (!) 98/68, pulse 75, temperature 36.5 °C (97.7 °F), resp. rate 16, height 1.753 m (5' 9\"), " weight 114.3 kg (252 lb), last menstrual period 08/04/2017, SpO2 95 %. Body mass index is 37.21 kg/m².   Physical Exam:  Constitutional: Alert, no distress.  Skin: Warm, dry, good turgor, no rashes in visible areas.  Eye: Equal, round and reactive, conjunctiva clear, lids normal.  ENMT: Lips without lesions, good dentition, oropharynx clear.  Neck: Trachea midline, no masses, no thyromegaly. No cervical or supraclavicular lymphadenopathy  Respiratory: Unlabored respiratory effort, lungs clear to auscultation, no wheezes, no ronchi.  Cardiovascular: Normal S1, S2, no murmur, no edema.  Abdomen: Soft, non-tender, no masses, no hepatosplenomegaly.  Psych: Alert and oriented x3, normal affect and mood.        Assessment and Plan:   The following treatment plan was discussed    1. Obesity (BMI 30-39.9)  Counseling for a small portion, balanced diet, exercising 3-5 times per week. Continue to monitor, at least 8 more pounds weight loss on one more month      2. Chronic pain of left ankle  3. Chronic bilateral thoracic back pain  4. Chronic pain of both knees  5. Pain of right hip joint   referral for home health. Patient is asking help for cooking cleaning at her house due to her disabilities of chronic pain. She reports she has pain in her hands and back and knees and she is not able to, take care of cooking, clean her house. Referral placed. I will defer evaluation to all home health   REFERRAL TO HOME HEALTH    6. Bipolar 1 disorder (CMS-[facility])  Follow up with psychiatry. Continue same treatment    7. Primary insomnia  Continue trazodone    8. Need for vaccination    - INFLUENZA VACCINE QUAD INJ >3Y(PF)        Followup: Return in about 4 weeks (around 9/29/2017) for Short.

## 2017-09-01 NOTE — ASSESSMENT & PLAN NOTE
Chronic back pain for a long time. She had refused surgery in the past. She tells me that she needs pain meds to function. She was not happy with one of pain management, she would like to follow up with them. She has a referral

## 2017-09-01 NOTE — ASSESSMENT & PLAN NOTE
Her psychiatry had stopped Tegretol and Geodon. She is not maniacal, she is not suicidal, doing well

## 2017-09-01 NOTE — ASSESSMENT & PLAN NOTE
She had a surgery 08/18/2017 for hardware removal. She is recovering well. She has a boot on. She was recommended to avoid weight baring. She is on percocet for pain., she is taking only if pain is very excruciating. Not able to examine due to boot

## 2017-09-02 ENCOUNTER — HOME CARE VISIT (OUTPATIENT)
Dept: HOME HEALTH SERVICES | Facility: HOME HEALTHCARE | Age: 47
End: 2017-09-02

## 2017-09-07 ENCOUNTER — HOME CARE VISIT (OUTPATIENT)
Dept: HOME HEALTH SERVICES | Facility: HOME HEALTHCARE | Age: 47
End: 2017-09-07

## 2017-09-29 ENCOUNTER — HOSPITAL ENCOUNTER (EMERGENCY)
Facility: MEDICAL CENTER | Age: 47
End: 2017-09-30
Attending: EMERGENCY MEDICINE
Payer: MEDICARE

## 2017-09-29 ENCOUNTER — HOSPITAL ENCOUNTER (EMERGENCY)
Facility: MEDICAL CENTER | Age: 47
End: 2017-09-29
Attending: EMERGENCY MEDICINE
Payer: MEDICARE

## 2017-09-29 VITALS
HEART RATE: 79 BPM | SYSTOLIC BLOOD PRESSURE: 120 MMHG | WEIGHT: 250 LBS | OXYGEN SATURATION: 98 % | RESPIRATION RATE: 20 BRPM | HEIGHT: 69 IN | BODY MASS INDEX: 37.03 KG/M2 | DIASTOLIC BLOOD PRESSURE: 90 MMHG | TEMPERATURE: 97.9 F

## 2017-09-29 DIAGNOSIS — T50.905A MEDICATION SIDE EFFECT, INITIAL ENCOUNTER: ICD-10-CM

## 2017-09-29 DIAGNOSIS — R20.2 PARESTHESIA: ICD-10-CM

## 2017-09-29 DIAGNOSIS — E87.6 HYPOKALEMIA: ICD-10-CM

## 2017-09-29 DIAGNOSIS — F31.9 BIPOLAR 1 DISORDER (HCC): ICD-10-CM

## 2017-09-29 LAB
ALBUMIN SERPL BCP-MCNC: 3.4 G/DL (ref 3.2–4.9)
ALBUMIN/GLOB SERPL: 1.1 G/DL
ALP SERPL-CCNC: 77 U/L (ref 30–99)
ALT SERPL-CCNC: 24 U/L (ref 2–50)
ANION GAP SERPL CALC-SCNC: 10 MMOL/L (ref 0–11.9)
AST SERPL-CCNC: 18 U/L (ref 12–45)
BASOPHILS # BLD AUTO: 0.6 % (ref 0–1.8)
BASOPHILS # BLD: 0.04 K/UL (ref 0–0.12)
BILIRUB SERPL-MCNC: 0.5 MG/DL (ref 0.1–1.5)
BUN SERPL-MCNC: 5 MG/DL (ref 8–22)
CALCIUM SERPL-MCNC: 8.9 MG/DL (ref 8.5–10.5)
CHLORIDE SERPL-SCNC: 106 MMOL/L (ref 96–112)
CO2 SERPL-SCNC: 25 MMOL/L (ref 20–33)
CREAT SERPL-MCNC: 0.53 MG/DL (ref 0.5–1.4)
EKG IMPRESSION: NORMAL
EOSINOPHIL # BLD AUTO: 0.22 K/UL (ref 0–0.51)
EOSINOPHIL NFR BLD: 3.1 % (ref 0–6.9)
ERYTHROCYTE [DISTWIDTH] IN BLOOD BY AUTOMATED COUNT: 43.1 FL (ref 35.9–50)
GFR SERPL CREATININE-BSD FRML MDRD: >60 ML/MIN/1.73 M 2
GLOBULIN SER CALC-MCNC: 3.2 G/DL (ref 1.9–3.5)
GLUCOSE SERPL-MCNC: 89 MG/DL (ref 65–99)
HCT VFR BLD AUTO: 38.2 % (ref 37–47)
HGB BLD-MCNC: 13 G/DL (ref 12–16)
IMM GRANULOCYTES # BLD AUTO: 0.03 K/UL (ref 0–0.11)
IMM GRANULOCYTES NFR BLD AUTO: 0.4 % (ref 0–0.9)
LYMPHOCYTES # BLD AUTO: 2.52 K/UL (ref 1–4.8)
LYMPHOCYTES NFR BLD: 35.4 % (ref 22–41)
MCH RBC QN AUTO: 30.4 PG (ref 27–33)
MCHC RBC AUTO-ENTMCNC: 34 G/DL (ref 33.6–35)
MCV RBC AUTO: 89.5 FL (ref 81.4–97.8)
MONOCYTES # BLD AUTO: 0.53 K/UL (ref 0–0.85)
MONOCYTES NFR BLD AUTO: 7.5 % (ref 0–13.4)
NEUTROPHILS # BLD AUTO: 3.77 K/UL (ref 2–7.15)
NEUTROPHILS NFR BLD: 53 % (ref 44–72)
NRBC # BLD AUTO: 0 K/UL
NRBC BLD AUTO-RTO: 0 /100 WBC
PLATELET # BLD AUTO: 259 K/UL (ref 164–446)
PMV BLD AUTO: 9.8 FL (ref 9–12.9)
POTASSIUM SERPL-SCNC: 2.8 MMOL/L (ref 3.6–5.5)
PROT SERPL-MCNC: 6.6 G/DL (ref 6–8.2)
RBC # BLD AUTO: 4.27 M/UL (ref 4.2–5.4)
SODIUM SERPL-SCNC: 141 MMOL/L (ref 135–145)
WBC # BLD AUTO: 7.1 K/UL (ref 4.8–10.8)

## 2017-09-29 PROCEDURE — 80053 COMPREHEN METABOLIC PANEL: CPT

## 2017-09-29 PROCEDURE — 99284 EMERGENCY DEPT VISIT MOD MDM: CPT

## 2017-09-29 PROCEDURE — A9270 NON-COVERED ITEM OR SERVICE: HCPCS | Performed by: EMERGENCY MEDICINE

## 2017-09-29 PROCEDURE — 85025 COMPLETE CBC W/AUTO DIFF WBC: CPT

## 2017-09-29 PROCEDURE — 700102 HCHG RX REV CODE 250 W/ 637 OVERRIDE(OP): Performed by: EMERGENCY MEDICINE

## 2017-09-29 PROCEDURE — 93005 ELECTROCARDIOGRAM TRACING: CPT | Performed by: EMERGENCY MEDICINE

## 2017-09-29 RX ORDER — POTASSIUM CHLORIDE 20 MEQ/1
40 TABLET, EXTENDED RELEASE ORAL ONCE
Status: COMPLETED | OUTPATIENT
Start: 2017-09-29 | End: 2017-09-29

## 2017-09-29 RX ORDER — POTASSIUM CHLORIDE 20 MEQ/1
20 TABLET, EXTENDED RELEASE ORAL 2 TIMES DAILY
Qty: 60 TAB | Refills: 0 | Status: SHIPPED | OUTPATIENT
Start: 2017-09-29 | End: 2017-10-29

## 2017-09-29 RX ADMIN — POTASSIUM CHLORIDE 40 MEQ: 1500 TABLET, EXTENDED RELEASE ORAL at 18:39

## 2017-09-29 ASSESSMENT — PAIN SCALES - GENERAL: PAINLEVEL_OUTOF10: 0

## 2017-09-29 ASSESSMENT — LIFESTYLE VARIABLES: DO YOU DRINK ALCOHOL: NO

## 2017-09-29 NOTE — ED PROVIDER NOTES
"ED Provider Note    CHIEF COMPLAINT  Chief Complaint   Patient presents with   • Lightheadedness   • Numbness       [facility]  Cortney Gavin is a 47 y.o. female who presents For evaluation of numerous complaints including \"whole body tingliness \". Patient has a long medical history including underlying schizophrenia and bipolar. Apparently her psychiatrist Dr. Chatterjee recently increased her risperidone to twice daily dose. She noted that change was made a few days ago and ever since then she has felt \"tingly\" all over. She specifically denies any focal numbness to the arms legs or face. She still recovering from a left ankle surgery with Dr. Schroeder. No other change in medications. She denies any auditory or visual hallucinations. No high fevers or chills. No reported rash headache or neck stiffness. She reports lightheadedness but no syncope or palpitations.    REVIEW OF SYSTEMS  See HPI for further details. No night sweats weakness fevers chills headache rash All other systems are negative.     PAST MEDICAL HISTORY  Past Medical History:   Diagnosis Date   • Arthritis    • Back pain    • Bipolar 1 disorder (CMS-[facility])    • Chronic back pain    • Schizophrenia (CMS-[facility])    • Seizure (CMS-[facility])     as an infant       FAMILY HISTORY  No history of bleeding disorder  SOCIAL HISTORY  Social History     Social History   • Marital status:      Spouse name: N/A   • Number of children: N/A   • Years of education: N/A     Social History Main Topics   • Smoking status: Never Smoker   • Smokeless tobacco: Never Used   • Alcohol use No   • Drug use: No      Comment: thc=  last used 2/2017, tried once    • Sexual activity: No     Other Topics Concern   • Not on file     Social History Narrative   • No narrative on file     Patient denies cigarettes no IV drug use  SURGICAL HISTORY  Past Surgical History:   Procedure Laterality Date   • HARDWARE REMOVAL ORTHO Left 8/18/2017    Procedure: HARDWARE REMOVAL ORTHO - ANKLE;  " "Surgeon: Aguilar Schroeder M.D.;  Location: SURGERY [facility];  Service:    • ANKLE ORIF Left 8/18/2017    Procedure: ANKLE ORIF - SYNDESMOSIS INJURY ;  Surgeon: Aguilar Schroeder M.D.;  Location: SURGERY [facility];  Service:    • ANKLE ORIF Left 2015   • TUBAL LIGATION  1990's   • KNEE ARTHROTOMY Bilateral 1980's       CURRENT MEDICATIONS  No current facility-administered medications for this encounter.     Current Outpatient Prescriptions:   •  sertraline (ZOLOFT) 25 MG tablet, Take 25 mg by mouth every day., Disp: , Rfl:   •  oxycodone-acetaminophen (PERCOCET) 5-325 MG Tab, Take 1-2 Tabs by mouth every four hours as needed., Disp: 20 Tab, Rfl: 0  •  trazodone (DESYREL) 150 MG Tab, Take 150 mg by mouth every evening., Disp: , Rfl:   •  buPROPion SR (WELLBUTRIN-SR) 100 MG TABLET SR 12 HR, Take 100 mg by mouth 2 times a day., Disp: , Rfl:   •  gabapentin (NEURONTIN) 300 MG Cap, Take 300 mg by mouth 2 Times a Day. Two tabs in the am (600mg) one tab in the evening (300mg), Disp: , Rfl:   •  ibuprofen (MOTRIN) 800 MG Tab, Take 800 mg by mouth every day., Disp: , Rfl:       ALLERGIE none     PHYSICAL EXAM  VITAL SIGNS: /90   Pulse (!) 105   Temp 36.6 °C (97.9 °F)   Resp 20   Ht 1.753 m (5' 9\")   Wt 113.4 kg (250 lb)   SpO2 98%   BMI 36.92 kg/m²  Room air O2: 98    Constitutional: Well developed, Well nourished, No acute distress, Non-toxic appearance.   HENT: Normocephalic, Atraumatic, Bilateral external ears normal, Oropharynx moist, No oral exudates, Nose normal.   Eyes: PERRLA, EOMI, Conjunctiva normal, No discharge.   Neck: Normal range of motion, No tenderness, Supple, No stridor.   Cardiovascular: Minimal tachycardia, Normal rhythm, No murmurs, No rubs, No gallops.   Thorax & Lungs: Normal breath sounds, No respiratory distress, No wheezing, No chest tenderness.   Abdomen: Bowel sounds normal, Soft, No tenderness, No masses, No pulsatile masses.   Skin: Warm, Dry, No erythema, No rash. "   Back: No tenderness, No CVA tenderness.   Extremities: Intact distal pulses, No edema, left lower extremity is in a long leg orthopedic postoperative boot  Neurologic: Alert & oriented x 3, the ulnar 2 through 12 grossly intact. No pronator drift Normal motor function, Normal sensory function, No focal deficits noted.   Psychiatric: The patient is somewhat tangential but does not appear to be acutely psychotic or suicidal Affect normal, Judgment normal, Mood normal.     EKG  EKG Interpretation    Interpreted by me    Rhythm: normal sinus   Rate: normal  Axis: normal  Ectopy: none  Conduction: normal  ST Segments: no acute change  T Waves: no acute change  Q Waves: none    Clinical Impression: no acute changes and normal EKG    RADIOLOGY/PROCEDURES  Results for orders placed or performed during the hospital encounter of 09/29/17   CBC WITH DIFFERENTIAL   Result Value Ref Range    WBC 7.1 4.8 - 10.8 K/uL    RBC 4.27 4.20 - 5.40 M/uL    Hemoglobin 13.0 12.0 - 16.0 g/dL    Hematocrit 38.2 37.0 - 47.0 %    MCV 89.5 81.4 - 97.8 fL    MCH 30.4 27.0 - 33.0 pg    MCHC 34.0 33.6 - 35.0 g/dL    RDW 43.1 35.9 - 50.0 fL    Platelet Count 259 164 - 446 K/uL    MPV 9.8 9.0 - 12.9 fL    Neutrophils-Polys 53.00 44.00 - 72.00 %    Lymphocytes 35.40 22.00 - 41.00 %    Monocytes 7.50 0.00 - 13.40 %    Eosinophils 3.10 0.00 - 6.90 %    Basophils 0.60 0.00 - 1.80 %    Immature Granulocytes 0.40 0.00 - 0.90 %    Nucleated RBC 0.00 /100 WBC    Neutrophils (Absolute) 3.77 2.00 - 7.15 K/uL    Lymphs (Absolute) 2.52 1.00 - 4.80 K/uL    Monos (Absolute) 0.53 0.00 - 0.85 K/uL    Eos (Absolute) 0.22 0.00 - 0.51 K/uL    Baso (Absolute) 0.04 0.00 - 0.12 K/uL    Immature Granulocytes (abs) 0.03 0.00 - 0.11 K/uL    NRBC (Absolute) 0.00 K/uL   COMP METABOLIC PANEL   Result Value Ref Range    Sodium 141 135 - 145 mmol/L    Potassium 2.8 (L) 3.6 - 5.5 mmol/L    Chloride 106 96 - 112 mmol/L    Co2 25 20 - 33 mmol/L    Anion Gap 10.0 0.0 - 11.9     Glucose 89 65 - 99 mg/dL    Bun 5 (L) 8 - 22 mg/dL    Creatinine 0.53 0.50 - 1.40 mg/dL    Calcium 8.9 8.5 - 10.5 mg/dL    AST(SGOT) 18 12 - 45 U/L    ALT(SGPT) 24 2 - 50 U/L    Alkaline Phosphatase 77 30 - 99 U/L    Total Bilirubin 0.5 0.1 - 1.5 mg/dL    Albumin 3.4 3.2 - 4.9 g/dL    Total Protein 6.6 6.0 - 8.2 g/dL    Globulin 3.2 1.9 - 3.5 g/dL    A-G Ratio 1.1 g/dL   ESTIMATED GFR   Result Value Ref Range    GFR If African American >60 >60 mL/min/1.73 m 2    GFR If Non African American >60 >60 mL/min/1.73 m 2   EKG (ER)   Result Value Ref Range    Report       [facility] Emergency Dept.    Test Date:  2017  Pt Name:    KYLE KWON               Department: ER  MRN:        [id number]                      Room:       [facility]  Gender:     F                            Technician: [id number]  :        1970                   Requested By:JOEY CORRALES  Order #:    [id number]                    Reading MD:    Measurements  Intervals                                Axis  Rate:       67                           P:          21  NH:         172                          QRS:        34  QRSD:       112                          T:          47  QT:         440  QTc:        465    Interpretive Statements  SINUS RHYTHM  INCOMPLETE RIGHT BUNDLE BRANCH BLOCK  Compared to ECG 10/19/2016 02:26:48  Incomplete right bundle-branch block now present          COURSE & MEDICAL DECISION MAKING  Pertinent Labs & Imaging studies reviewed. (See chart for details)  Patient presented here with relatively vague symptoms including some tingling to her bilateral hands some generalized symptoms. Here she has no fever no vital sign abnormality and the symptoms do not appear to be consistent with stroke syndrome. I also worried about metabolic issue. Here she does have a moderately low potassium at 2.8 but no significant EKG changes or evidence of heart block or any significant conduction issues. She apparently is very  hard IV stick and she did not want an IV so we did not administer parenteral potassium. She did agree to taking 40 mEq of oral potassium I'll start her on 20 mEq twice a day. I recommended that she follow up with her PCP for potassium check and 3-4 days    FINAL IMPRESSION  1. Hypokalemia      Electronically signed by: Markus Joel, 9/29/2017 4:54 PM

## 2017-09-29 NOTE — ED NOTES
Pt to triage c/o lightheadedness and tingling to face and hands when sitting down awaiting for a routine appointment at 1315. AAO x 4  Pt advised to return to triage nurse for any changes or concerns.

## 2017-09-30 VITALS
RESPIRATION RATE: 16 BRPM | HEIGHT: 69 IN | BODY MASS INDEX: 37.03 KG/M2 | DIASTOLIC BLOOD PRESSURE: 55 MMHG | WEIGHT: 250 LBS | TEMPERATURE: 98.2 F | HEART RATE: 69 BPM | SYSTOLIC BLOOD PRESSURE: 116 MMHG | OXYGEN SATURATION: 95 %

## 2017-09-30 PROCEDURE — 99284 EMERGENCY DEPT VISIT MOD MDM: CPT

## 2017-09-30 ASSESSMENT — PAIN SCALES - GENERAL: PAINLEVEL_OUTOF10: 10

## 2017-09-30 NOTE — ED NOTES
".  Chief Complaint   Patient presents with   • Shortness of Breath     pt states was just released from er with diagnosis of anxiety, pt states this is not what is wrong with her co \"tingling in my head going down to my neck and I have been having some shortness of breath\" co not feeling well, resp are even and unlabored   • Headache     bib remsa for above complaint     Pt states been feeling this way for \"a couple of months if not longer\" pt appears to be in no distress resting in w/c resp are even and unlabored able to speak in complete sentences without difficulty.Pt Informed regarding triage process and verbalized understanding to inform triage tech or RN for any changes in condition.  Placed in lobby.      "

## 2017-09-30 NOTE — ED NOTES
Pt given d/c instructions/follow up instructions, pt verbalized understanding of POC, pt to ER lobby on scooter.

## 2017-09-30 NOTE — DISCHARGE INSTRUCTIONS
Hypokalemia  Hypokalemia means a low potassium level in the blood. Symptoms may include muscle weakness and cramping, fatigue, abdominal pain, vomiting, constipation, or irregularities of the heartbeat. Sometimes hypokalemia is discovered by your caregiver if you are taking certain medicines for high blood pressure or kidney disease.   Potassium is an electrolyte that helps regulate the amount of fluid in the body. It also stimulates muscle contraction and maintains a stable acid-base balance. If potassium levels go too low or too high, your health may be in danger. You are at risk for developing shock, heart, and lung problems. Hypokalemia can occur if you have excessive diarrhea, vomiting, or sweating. Potassium can be lost through your kidneys in the urine. Certain common medicines can also cause potassium loss, especially water pills (diuretics). The same is possible with cortisone medications or certain types of antibiotics. Low potassium can be dangerous if you are taking certain heart medicines. In diabetes, your potassium may fall after you take insulin, especially if your diabetes had been out of control for a while. In rare cases, potassium may be low because you are not getting enough in your diet.   In adults, a potassium level below 3.5 mEq/L is usually considered low.  Hypokalemia can be treated with potassium supplements taken by mouth and a diet that is high in potassium. Foods with high potassium content are:  · Peas, lentils, lima beans, nuts, and dried fruit.   · Whole grain and bran cereals and breads.   · Fresh fruit and vegetables. Examples include:   · Bananas.   · Cantaloupe.   · Grapefruit.   · Oranges.   · Tomatoes.   · Honeydew melons.   · Potatoes.   · Peaches.   · Orange and tomato juices.   · Meats.   See your caregiver as instructed for a follow-up blood test to be sure your potassium is back to normal.  SEEK MEDICAL CARE IF:   · You have nausea, vomiting, constipation, or abdominal  pain.   · You have palpitations or irregular heartbeats, chest pain or shortness of breath.   · You have muscle cramps or weakness or fatigue.   · You have lethargy.   SEEK IMMEDIATE MEDICAL CARE IF:   · You have paralysis.   · You have confusion or other mental status changes.   Document Released: 12/18/2006 Document Revised: 03/11/2013 Document Reviewed: 04/13/2011  [employer]® Patient Information ©2013 [employer], [employer].

## 2017-09-30 NOTE — ED NOTES
Connected to BP cuff, and continuous pulse ox upon arrival.  Pt in gown, call light in reach, bed in lowest position.  Chart up for ERP.

## 2017-09-30 NOTE — ED NOTES
D/C instructions given. Pt states understanding. No further questions received. Pt notified ride via phone. Pt states she wishes to wait for ride in front lobby. Pt left ambulatory with assistance from sckuldipter.

## 2017-09-30 NOTE — DISCHARGE PLANNING
[facility] Social Work     DILLAN received a call from the RN requesting assistance with homeless resources and a place to stay tonight. SW called the woman's homeless shelter and they stated there are no beds but they can stay at the day room. The shelter did state that this pt does have a home but she is still able to stay at the day room. DILLAN spoke with the pt at bedside and she stated that she would call her a person by the name by Frank [phone]. Frank spoke with the pt and then with the SW, Frank stated that she would come and  the pt from [facility]. DILLAN updated the bedside RN.

## 2017-09-30 NOTE — ED NOTES
"Pt states lab was just in room to draw. EKG was obtained by ZOHREH Irene. Pt states to be feeling much better than when she first arrived. Pt states to still have some \"spasms in the forearms\". No obvious signs of uncoordinated movements noted. Strong radial pulses bilaterally. Strong /push/pulls equal bilaterally. Pt requests food and drink.   "

## 2017-09-30 NOTE — ED PROVIDER NOTES
"ED Provider Note    Scribed for Dylon Villagomez M.D. by Adrienne Mejias. 9/29/2017, 11:38 PM.    Primary care provider: Rubi Ruby M.D.  Means of arrival: Ambulance  History obtained from: Patient  History limited by: None    CHIEF COMPLAINT  Chief Complaint   Patient presents with   • Shortness of Breath     pt states was just released from er with diagnosis of anxiety, pt states this is not what is wrong with her co \"tingling in my head going down to my neck and I have been having some shortness of breath\" co not feeling well, resp are even and unlabored   • Headache     bib remsa for above complaint       HPI  Cortney Gavin is a 47 y.o. female with a history of anxiety and schizophrenia brought in by ambulance to the Emergency Department with Generalized malaise and paresthesias. The patient reports she was seen in the ED earlier today for similar symptoms and was discharged home after treatment with potassium for low potassium levels. She reports tingling from the apex of the scalp down to her nose. She also complains of bilateral hand pain and numbness, and endorses generalized weakness. She tells me her risperidone dose was increased to BID 2-3 days ago by her psychiatrist and she only took one tablet today due to adverse side effects. The patient has some left ankle and foot pain, following a recent surgery on the left ankle with Dr. Schroeder. She denies fever, vision changes, focal weakness, chest pain, or other symptoms.     REVIEW OF SYSTEMS  See HPI,  Negative for fever, vision changes, focal weakness, chest pain. Remainder of ROS negative. C.    PAST MEDICAL HISTORY   has a past medical history of Arthritis; Back pain; Bipolar 1 disorder (CMS-[facility]); Chronic back pain; Schizophrenia (CMS-[facility]); and Seizure (CMS-[facility]).    SURGICAL HISTORY   has a past surgical history that includes ankle orif (Left, 2015); knee arthrotomy (Bilateral, 1980's); tubal ligation (1990's); hardware removal ortho (Left, " "8/18/2017); and ankle orif (Left, 8/18/2017).    SOCIAL HISTORY  Social History   Substance Use Topics   • Smoking status: Never Smoker   • Smokeless tobacco: Never Used   • Alcohol use No      History   Drug Use No     Comment: thc=  last used 2/2017, tried once        FAMILY HISTORY  Family History   Problem Relation Age of Onset   • Heart Disease Mother      a fib   • Heart Disease Maternal Aunt      chf   • Cancer Maternal Grandfather      lung cancer, smoker   • Diabetes Neg Hx        CURRENT MEDICATIONS  Reviewed.  See Encounter Summary.     ALLERGIES  Allergies   Allergen Reactions   • Norco [Apap-Fd&C Yellow #10 [place]-Hydrocodone] Itching       PHYSICAL EXAM  VITAL SIGNS: /55   Pulse 78   Temp 36.8 °C (98.2 °F)   Resp 16   Ht 1.753 m (5' 9\")   Wt 113.4 kg (250 lb)   SpO2 95%   BMI 36.92 kg/m²   Constitutional: Awake, alert in no apparent distress.  HENT: Normocephalic, Bilateral external ears normal. Nose normal.   Eyes: Conjunctiva normal, non-icteric, EOMI.    Thorax & Lungs: Easy unlabored respirations, Clear to ascultation bilaterally.  Cardiovascular: Regular rate, Regular rhythm, No murmurs, rubs or gallops.  Abdomen:  Soft, nontender, no masses   Skin: Visualized skin is  Dry, No erythema, No rash.   Extremities:   No cyanosis, clubbing or edema.  Neurologic: Alert, Grossly non-focal. CN II-XII intact. Sensation intact all extremities. 5/5 strength throughout. DTR 2+ at bilateral bicep, BR, patella. Negative rhonberg, no drift, no dysmetria. Heel to shin and rapid alternating movents executed normally. Normal speech.   Psychiatric: Normal affect, Normal mood      COURSE & MEDICAL DECISION MAKING  Pertinent Labs & Imaging studies reviewed. (See chart for details)    11:38 PM - Patient seen and examined at bedside. We discussed her symptoms are likely secondary to her risperidone dose change and her low potassium level. The patient had an otherwise negative workup earlier today and I do " not suspect cerebrovascular accident given the distribution of her symptom of numbness. Patient will be discharged home, and she was advised to follow up with her psychiatrist regarding her medication changes. She will return to the ED for chest pain, worsening symptoms, or other medical concerns.     Decision Making:  This is a 47 y.o. year old female who presents for the 2nd time tonight for vague complaints. She discussed paresthesias from the apex of her scalp down to the middle of her nose as well as the middle of the palms distally. This distribution is not concerning for CVA, peripheral nerve or Guillain-Barré. The symptoms are most likely related to either her schizophrenia, mild hypokalemia or recent increase in Risperdal. Based the presentation today, I do not feel that a CT of the head would be of any clinical utility. I also do not feel that laboratory evaluation is required as she pleaded laboratory evaluation less than 6 hours ago. There is unlikely any significant changes. She should continue the potassium as directed on her last discharge paperwork. Also I recommend she continue on the daily Risperdal.        DISPOSITION:  Patient will be discharged home in good condition.    The patient was discharged home (see d/c instructions) and told to return immediately for any signs or symptoms listed, or any worsening at all.  The patient verbally agreed to the discharge precautions and follow-up plan which is documented in EPIC.     FINAL IMPRESSION  1. Paresthesia    2. Hypokalemia    3. Medication side effect, initial encounter    4. Bipolar 1 disorder (CMS-[facility])          Adrienne RIVAS (Alex), am scribing for, and in the presence of, Dylon Villagomez M.D..    Electronically signed by: Adrienne Mejias (Alex), 9/29/2017    Dylon RIVAS M.D. personally performed the services described in this documentation, as scribed by Adrienne Mejias in my presence, and it is both accurate and  complete.    The note accurately reflects work and decisions made by me.  Dylon Villagomez  9/30/2017  1:35 AM

## 2017-10-01 ENCOUNTER — HOSPITAL ENCOUNTER (EMERGENCY)
Facility: MEDICAL CENTER | Age: 47
End: 2017-10-01
Attending: EMERGENCY MEDICINE
Payer: MEDICARE

## 2017-10-01 ENCOUNTER — HOSPITAL ENCOUNTER (EMERGENCY)
Facility: MEDICAL CENTER | Age: 47
End: 2017-10-02
Attending: EMERGENCY MEDICINE
Payer: MEDICARE

## 2017-10-01 VITALS
DIASTOLIC BLOOD PRESSURE: 91 MMHG | TEMPERATURE: 97.8 F | RESPIRATION RATE: 16 BRPM | HEART RATE: 89 BPM | SYSTOLIC BLOOD PRESSURE: 140 MMHG | BODY MASS INDEX: 37.33 KG/M2 | WEIGHT: 252 LBS | OXYGEN SATURATION: 96 % | HEIGHT: 69 IN

## 2017-10-01 VITALS
OXYGEN SATURATION: 95 % | WEIGHT: 252 LBS | BODY MASS INDEX: 37.33 KG/M2 | HEIGHT: 69 IN | RESPIRATION RATE: 15 BRPM | HEART RATE: 67 BPM | TEMPERATURE: 98.1 F | SYSTOLIC BLOOD PRESSURE: 125 MMHG | DIASTOLIC BLOOD PRESSURE: 85 MMHG

## 2017-10-01 DIAGNOSIS — M25.562 CHRONIC PAIN OF BOTH KNEES: ICD-10-CM

## 2017-10-01 DIAGNOSIS — M79.89 LEFT LEG SWELLING: ICD-10-CM

## 2017-10-01 DIAGNOSIS — M25.572 CHRONIC PAIN OF LEFT ANKLE: ICD-10-CM

## 2017-10-01 DIAGNOSIS — M25.561 CHRONIC PAIN OF BOTH KNEES: ICD-10-CM

## 2017-10-01 DIAGNOSIS — G89.29 CHRONIC PAIN OF BOTH KNEES: ICD-10-CM

## 2017-10-01 DIAGNOSIS — G89.29 CHRONIC PAIN OF LEFT ANKLE: ICD-10-CM

## 2017-10-01 DIAGNOSIS — R51.9 NONINTRACTABLE HEADACHE, UNSPECIFIED CHRONICITY PATTERN, UNSPECIFIED HEADACHE TYPE: ICD-10-CM

## 2017-10-01 DIAGNOSIS — F51.01 PRIMARY INSOMNIA: ICD-10-CM

## 2017-10-01 DIAGNOSIS — F31.9 BIPOLAR 1 DISORDER (HCC): ICD-10-CM

## 2017-10-01 LAB
ANION GAP SERPL CALC-SCNC: 8 MMOL/L (ref 0–11.9)
BUN SERPL-MCNC: 5 MG/DL (ref 8–22)
CALCIUM SERPL-MCNC: 9.1 MG/DL (ref 8.5–10.5)
CHLORIDE SERPL-SCNC: 106 MMOL/L (ref 96–112)
CO2 SERPL-SCNC: 23 MMOL/L (ref 20–33)
CREAT SERPL-MCNC: 0.57 MG/DL (ref 0.5–1.4)
GFR SERPL CREATININE-BSD FRML MDRD: >60 ML/MIN/1.73 M 2
GLUCOSE SERPL-MCNC: 86 MG/DL (ref 65–99)
POTASSIUM SERPL-SCNC: 3.6 MMOL/L (ref 3.6–5.5)
SODIUM SERPL-SCNC: 137 MMOL/L (ref 135–145)

## 2017-10-01 PROCEDURE — 700102 HCHG RX REV CODE 250 W/ 637 OVERRIDE(OP): Performed by: EMERGENCY MEDICINE

## 2017-10-01 PROCEDURE — 96375 TX/PRO/DX INJ NEW DRUG ADDON: CPT

## 2017-10-01 PROCEDURE — 93971 EXTREMITY STUDY: CPT

## 2017-10-01 PROCEDURE — 80048 BASIC METABOLIC PNL TOTAL CA: CPT

## 2017-10-01 PROCEDURE — 96374 THER/PROPH/DIAG INJ IV PUSH: CPT

## 2017-10-01 PROCEDURE — 99284 EMERGENCY DEPT VISIT MOD MDM: CPT

## 2017-10-01 PROCEDURE — 96361 HYDRATE IV INFUSION ADD-ON: CPT

## 2017-10-01 PROCEDURE — 700111 HCHG RX REV CODE 636 W/ 250 OVERRIDE (IP): Performed by: EMERGENCY MEDICINE

## 2017-10-01 PROCEDURE — 700105 HCHG RX REV CODE 258: Performed by: EMERGENCY MEDICINE

## 2017-10-01 PROCEDURE — A9270 NON-COVERED ITEM OR SERVICE: HCPCS | Performed by: EMERGENCY MEDICINE

## 2017-10-01 PROCEDURE — 36415 COLL VENOUS BLD VENIPUNCTURE: CPT

## 2017-10-01 RX ORDER — METOCLOPRAMIDE HYDROCHLORIDE 5 MG/ML
10 INJECTION INTRAMUSCULAR; INTRAVENOUS ONCE
Status: COMPLETED | OUTPATIENT
Start: 2017-10-01 | End: 2017-10-01

## 2017-10-01 RX ORDER — DIPHENHYDRAMINE HYDROCHLORIDE 50 MG/ML
12.5 INJECTION INTRAMUSCULAR; INTRAVENOUS ONCE
Status: COMPLETED | OUTPATIENT
Start: 2017-10-01 | End: 2017-10-01

## 2017-10-01 RX ORDER — SODIUM CHLORIDE 9 MG/ML
1000 INJECTION, SOLUTION INTRAVENOUS ONCE
Status: COMPLETED | OUTPATIENT
Start: 2017-10-01 | End: 2017-10-01

## 2017-10-01 RX ORDER — GABAPENTIN 300 MG/1
300 CAPSULE ORAL ONCE
Status: COMPLETED | OUTPATIENT
Start: 2017-10-01 | End: 2017-10-01

## 2017-10-01 RX ADMIN — METOCLOPRAMIDE 10 MG: 5 INJECTION, SOLUTION INTRAMUSCULAR; INTRAVENOUS at 22:05

## 2017-10-01 RX ADMIN — GABAPENTIN 300 MG: 300 CAPSULE ORAL at 22:05

## 2017-10-01 RX ADMIN — DIPHENHYDRAMINE HYDROCHLORIDE 12.5 MG: 50 INJECTION, SOLUTION INTRAMUSCULAR; INTRAVENOUS at 22:05

## 2017-10-01 RX ADMIN — SODIUM CHLORIDE 1000 ML: 9 INJECTION, SOLUTION INTRAVENOUS at 22:04

## 2017-10-01 ASSESSMENT — PAIN SCALES - GENERAL
PAINLEVEL_OUTOF10: 10
PAINLEVEL_OUTOF10: ASSUMED PAIN PRESENT

## 2017-10-01 NOTE — ED NOTES
"Chief Complaint   Patient presents with   • Leg Pain     left   • Diarrhea     Left leg pain X last night.  States, \"I took too many of my medications and I am now out.\"  Diarrhea X 1 week.  Triage process explained to patient.  Pt back to waiting room.  Pt instructed to inform RN if any changes or questions arise.    "

## 2017-10-01 NOTE — DISCHARGE PLANNING
RN reported that the pt stated she has no where to go, as she is recently homeless. Per notes, pt was here last night and spoke with PM SW who provided her with resources. SW met with pt and provided education on self pay packet. SW educated pt on overflow shelter location. Pt stated understanding and reported having no questions or further needs at this time. SW encouraged follow up and will remain available. RN updated.

## 2017-10-01 NOTE — ED PROVIDER NOTES
ED Provider Note    Scribed for Tianna Almeida M.D. by Twyla Toth. 10/1/2017  8:23 AM    Primary care provider: Rubi Ruby M.D.; Dr. Aceves, Orthopaedics   Means of arrival: Ambulance  History obtained from: Patient  History limited by: None    CHIEF COMPLAINT  Chief Complaint   Patient presents with   • Ankle Swelling       HPI  Cortney Gavin is a 47 y.o. female who presents to the Emergency Department for ankle swelling with an onset of 7 days.  Patient had a left ankle ORIF completed in 2015 with removal of the hardware on 08/18/17.  She is currently wearing an ankle boot and has been unable to ambulate without difficulty.  She began to experience increased swelling to her left ankle over the past seven days. Symptoms are constant and worsened today.  Negative for falls, trauma, injury, erythema, left ankle pain, warmth, fever, chills, vomiting, chest pain or shortness of breath.      REVIEW OF SYSTEMS  HEENT:  No ear pain, congestion, or sore throat   EYES: no discharge, redness, or vision changes  CARDIAC: no chest pain, no palpitations    PULMONARY: no shortness of breath, cough, or congestion   GI: no vomiting, diarrhea, or abdominal pain   : no dysuria, back pain, or hematuria   Neuro: no weakness, numbness, aphasia, or headache  Musculoskeletal: Positive left ankle swelling. No left ankle pain, falls, trauma or injury.  Endocrine: no fevers or chills.  SKIN: No erythema or warmth to left ankle.    See history of present illness. All other systems are negative. C.      PAST MEDICAL HISTORY  Patient has a past medical history of Arthritis; Back pain; Bipolar 1 disorder (CMS-HCC); Chronic back pain; Schizophrenia (CMS-HCC); and Seizure (CMS-HCC).      SURGICAL HISTORY  Patient has a past surgical history that includes ankle orif (Left, 2015); knee arthrotomy (Bilateral, 1980's); tubal ligation (1990's); hardware removal ortho (Left, 8/18/2017); and ankle orif (Left, 8/18/2017).      SOCIAL  "HISTORY  Social History   Substance Use Topics   • Smoking status: Never Smoker   • Smokeless tobacco: Never Used   • Alcohol use No      History   Drug Use No     Comment: thc=  last used 2/2017, tried once        FAMILY HISTORY  Family History   Problem Relation Age of Onset   • Heart Disease Mother      a fib   • Heart Disease Maternal Aunt      chf   • Cancer Maternal Grandfather      lung cancer, smoker   • Diabetes Neg Hx        CURRENT MEDICATIONS  Home Medications     Reviewed by Miya Davila R.N. (Registered Nurse) on 10/01/17 at 0821  Med List Status: <None>   Medication Last Dose Status   buPROPion SR (WELLBUTRIN-SR) 100 MG TABLET SR 12 HR  Active   gabapentin (NEURONTIN) 300 MG Cap  Active   ibuprofen (MOTRIN) 800 MG Tab  Active   oxycodone-acetaminophen (PERCOCET) 5-325 MG Tab  Active   potassium chloride SA (KDUR) 20 MEQ Tab CR  Active   sertraline (ZOLOFT) 25 MG tablet  Active   trazodone (DESYREL) 150 MG Tab  Active                ALLERGIES  Allergies   Allergen Reactions   • Norco [Apap-Fd&C Yellow #10 [place]-Hydrocodone] Itching       PHYSICAL EXAM  VITAL SIGNS: /80   Pulse 64   Temp 36.7 °C (98.1 °F) (Temporal)   Resp 16   Ht 1.753 m (5' 9\")   Wt 114.3 kg (252 lb)   SpO2 94%   BMI 37.21 kg/m²       Constitutional: Well developed, Well nourished, No acute distress, Non-toxic appearance.   HEENT: Normocephalic, Atraumatic,  external ears normal, pharynx pink,  Mucous  Membranes moist, No rhinorrhea or mucosal edema  Lymphatic: No lymphadenopathy    Cardiovascular: Regular Rate and Rhythm, No murmurs,  rubs, or gallops.   Thorax & Lungs: Lungs clear to auscultation bilaterally, No respiratory distress, No wheezes, rhales or rhonchi, No chest wall tenderness.   Skin: Warm, Dry.  Back:  No CVA tenderness,  No spinal tenderness, bony crepitance, step offs, or instability.   Neurologic: Alert & oriented x 3, Normal motor function, Normal sensory function, No focal deficits noted. Normal " reflexes. Normal Cranial Nerves.  Extremities: Equal, intact distal pulses, No cyanosis, clubbing, No tenderness. Scar to left ankle.  Left ankle with diffuse swelling, Normal range of motion of left ankle.  No warmth, erythema, trauma, bruising; does not appear infected.  Musculoskeletal: Good range of motion in all major joints. No tenderness to palpation or major deformities noted.       DIAGNOSTIC STUDIES / PROCEDURES    RADIOLOGY  LE VENOUS DUPLEX (Specify in Comments Left, Right Or Bilateral)         no dvt  The radiologist's interpretation of all radiological studies have been reviewed by me.      COURSE & MEDICAL DECISION MAKING  Pertinent Labs & Imaging studies reviewed. (See chart for details)    Differential Diagnosis include but are not limited to: DVT or post-operative swelling after overuse of left ankle.    8:20 AM Reviewed patient's electronic medical record which indicates two ED visits on 09/29/17 for shortness of breath, headache, lightheaded and numbness.    8:23 AM Patient seen and examined at bedside. Patient presents for ankle swelling.  Exam indicates diffuse swelling to left ankle with normal range of motion; no evidence of trauma or infection.      Initial orders in the Emergency Department included LLE venous duplex.  Patient verbalized their understanding and agreement to this plan.    8:59 AM ED testing reveals no DVT.  She will be administered compression socks.    9:08 AM On repeat evaluation, patient is doing well. Ultrasound results were discussed which show no evidence of thrombosis.  Patient will be given compression socks.  Patient states she does not have anywhere to go once discharged.  She was staying at a behavioral health facility in [place] prior to ED arrival.   will provide resources to her.    Discharge plan was discussed with the patient and includes following up with Dr. Ruby in one day.  The patient will return for new or persisting symptoms including  "extremity pain.  The patient verbalizes understanding and will comply.      Patient is stable at the time of discharge.  Vital signs were reviewed: /80   Pulse 64   Temp 36.7 °C (98.1 °F) (Temporal)   Resp 16   Ht 1.753 m (5' 9\")   Wt 114.3 kg (252 lb)   SpO2 94%   BMI 37.21 kg/m²          DISPOSITION  Patient will be discharged home in stable condition.      FOLLOW UP  Rubi Ruby M.D.  [address]  [facility] [address] [phone]    Call in 1 day      The patient is referred to a primary physician for blood pressure management, diabetic screening, and for all other preventative health concerns.      DIAGNOSIS  1. Left leg swelling    2. Chronic pain of left ankle           The note accurately reflects work and decisions made by me.  Tianna Almeida  10/1/2017  10:48 AM     Twyla RIVAS (Scribe), am scribing for, and in the presence of, Tianna Almeida M.D.    Electronically signed by: Twyla Toth (Scribkike), 10/1/2017    Tianna RIVAS M.D. personally performed the services described in this documentation, as scribed by Twyla Toth in my presence, and it is both accurate and complete.      "

## 2017-10-01 NOTE — ED NOTES
Pt given discharge and follow up instructions.  Pt wrote on signed copy that she will be contacting an , states at a previous visit, while transferring from [facility] to bed she fell and that is why her foot hurts. Offered pt opportunity to discuss other needs for this visit, pt stated no.  Pt refused anti-embolism stocking.  Pt discharged with self. Copy of discharge provided to pt. Signed copy in chart.  Pt states that all personal belongings are in possession. Pt off unit with ortho scooter.

## 2017-10-01 NOTE — DISCHARGE INSTRUCTIONS
Please follow up with a primary physician for blood pressure management, diabetic screening, and all other preventive health concerns.

## 2017-10-02 ENCOUNTER — HOSPITAL ENCOUNTER (EMERGENCY)
Facility: MEDICAL CENTER | Age: 47
End: 2017-10-02
Attending: EMERGENCY MEDICINE
Payer: MEDICARE

## 2017-10-02 ENCOUNTER — APPOINTMENT (OUTPATIENT)
Dept: MEDICAL GROUP | Facility: PHYSICIAN GROUP | Age: 47
End: 2017-10-02
Payer: MEDICARE

## 2017-10-02 VITALS
OXYGEN SATURATION: 97 % | HEART RATE: 89 BPM | WEIGHT: 252 LBS | SYSTOLIC BLOOD PRESSURE: 124 MMHG | DIASTOLIC BLOOD PRESSURE: 84 MMHG | BODY MASS INDEX: 37.21 KG/M2 | RESPIRATION RATE: 18 BRPM | TEMPERATURE: 97.9 F

## 2017-10-02 DIAGNOSIS — M25.572 CHRONIC PAIN OF LEFT ANKLE: ICD-10-CM

## 2017-10-02 DIAGNOSIS — G89.29 CHRONIC PAIN OF LEFT ANKLE: ICD-10-CM

## 2017-10-02 DIAGNOSIS — Z91.148 NONCOMPLIANCE WITH MEDICATIONS: ICD-10-CM

## 2017-10-02 DIAGNOSIS — F41.9 ANXIETY: ICD-10-CM

## 2017-10-02 PROCEDURE — 99284 EMERGENCY DEPT VISIT MOD MDM: CPT

## 2017-10-02 PROCEDURE — A9270 NON-COVERED ITEM OR SERVICE: HCPCS | Performed by: EMERGENCY MEDICINE

## 2017-10-02 PROCEDURE — 700102 HCHG RX REV CODE 250 W/ 637 OVERRIDE(OP): Performed by: EMERGENCY MEDICINE

## 2017-10-02 RX ORDER — BUPROPION HYDROCHLORIDE 100 MG/1
100 TABLET ORAL ONCE
Status: COMPLETED | OUTPATIENT
Start: 2017-10-02 | End: 2017-10-02

## 2017-10-02 RX ORDER — ACETAMINOPHEN 325 MG/1
1000 TABLET ORAL ONCE
Status: COMPLETED | OUTPATIENT
Start: 2017-10-02 | End: 2017-10-02

## 2017-10-02 RX ORDER — IBUPROFEN 800 MG/1
800 TABLET ORAL ONCE
Status: COMPLETED | OUTPATIENT
Start: 2017-10-02 | End: 2017-10-02

## 2017-10-02 RX ADMIN — IBUPROFEN 800 MG: 800 TABLET, FILM COATED ORAL at 02:50

## 2017-10-02 RX ADMIN — BUPROPION HYDROCHLORIDE 100 MG: 100 TABLET, FILM COATED ORAL at 02:55

## 2017-10-02 RX ADMIN — ACETAMINOPHEN 975 MG: 325 TABLET, FILM COATED ORAL at 02:50

## 2017-10-02 NOTE — ED NOTES
Discharge instructions provided, pt verbalizes understanding. Pt awake, alert, VSS. Pt ambulatory with scooter out of ER.

## 2017-10-02 NOTE — DISCHARGE INSTRUCTIONS
Arthritis, Nonspecific  Arthritis is inflammation of a joint. This usually means pain, redness, warmth or swelling are present. One or more joints may be involved. There are a number of types of arthritis. Your caregiver may not be able to tell what type of arthritis you have right away.  CAUSES   The most common cause of arthritis is the wear and tear on the joint (osteoarthritis). This causes damage to the cartilage, which can break down over time. The knees, hips, back and neck are most often affected by this type of arthritis.  Other types of arthritis and common causes of joint pain include:  · Sprains and other injuries near the joint. Sometimes minor sprains and injuries cause pain and swelling that develop hours later.  · Rheumatoid arthritis. This affects hands, feet and knees. It usually affects both sides of your body at the same time. It is often associated with chronic ailments, fever, weight loss and general weakness.  · Crystal arthritis. Gout and pseudo gout can cause occasional acute severe pain, redness and swelling in the foot, ankle, or knee.  · Infectious arthritis. Bacteria can get into a joint through a break in overlying skin. This can cause infection of the joint. Bacteria and viruses can also spread through the blood and affect your joints.  · Drug, infectious and allergy reactions. Sometimes joints can become mildly painful and slightly swollen with these types of illnesses.  SYMPTOMS   · Pain is the main symptom.  · Your joint or joints can also be red, swollen and warm or hot to the touch.  · You may have a fever with certain types of arthritis, or even feel overall ill.  · The joint with arthritis will hurt with movement. Stiffness is present with some types of arthritis.  DIAGNOSIS   Your caregiver will suspect arthritis based on your description of your symptoms and on your exam. Testing may be needed to find the type of arthritis:  · Blood and sometimes urine tests.  · X-ray tests  and sometimes CT or MRI scans.  · Removal of fluid from the joint (arthrocentesis) is done to check for bacteria, crystals or other causes. Your caregiver (or a specialist) will numb the area over the joint with a local anesthetic, and use a needle to remove joint fluid for examination. This procedure is only minimally uncomfortable.  · Even with these tests, your caregiver may not be able to tell what kind of arthritis you have. Consultation with a specialist (rheumatologist) may be helpful.  TREATMENT   Your caregiver will discuss with you treatment specific to your type of arthritis. If the specific type cannot be determined, then the following general recommendations may apply.  Treatment of severe joint pain includes:  · Rest.  · Elevation.  · Anti-inflammatory medication (for example, ibuprofen) may be prescribed. Avoiding activities that cause increased pain.  · Only take over-the-counter or prescription medicines for pain and discomfort as recommended by your caregiver.  · Cold packs over an inflamed joint may be used for 10 to 15 minutes every hour. Hot packs sometimes feel better, but do not use overnight. Do not use hot packs if you are diabetic without your caregiver's permission.  · A cortisone shot into arthritic joints may help reduce pain and swelling.  · Any acute arthritis that gets worse over the next 1 to 2 days needs to be looked at to be sure there is no joint infection.  Long-term arthritis treatment involves modifying activities and lifestyle to reduce joint stress jarring. This can include weight loss. Also, exercise is needed to nourish the joint cartilage and remove waste. This helps keep the muscles around the joint strong.  HOME CARE INSTRUCTIONS   · Do not take aspirin to relieve pain if gout is suspected. This elevates uric acid levels.  · Only take over-the-counter or prescription medicines for pain, discomfort or fever as directed by your caregiver.  · Rest the joint as much as  possible.  · If your joint is swollen, keep it elevated.  · Use crutches if the painful joint is in your leg.  · Drinking plenty of fluids may help for certain types of arthritis.  · Follow your caregiver's dietary instructions.  · Try low-impact exercise such as:  ¨ Swimming.  ¨ Water aerobics.  ¨ Biking.  ¨ Walking.  · Morning stiffness is often relieved by a warm shower.  · Put your joints through regular range-of-motion.  SEEK MEDICAL CARE IF:   · You do not feel better in 24 hours or are getting worse.  · You have side effects to medications, or are not getting better with treatment.  SEEK IMMEDIATE MEDICAL CARE IF:   · You have a fever.  · You develop severe joint pain, swelling or redness.  · Many joints are involved and become painful and swollen.  · There is severe back pain and/or leg weakness.  · You have loss of bowel or bladder control.     This information is not intended to replace advice given to you by your health care provider. Make sure you discuss any questions you have with your health care provider.     Document Released: 01/25/2006 Document Revised: 01/08/2016 Document Reviewed: 03/14/2016  [employer] Interactive Patient Education ©2016 [employer]

## 2017-10-02 NOTE — ED NOTES
Chief Complaint   Patient presents with   • Foot Swelling   • Nausea   • Medication Refill     Patient to triage. States that she has been here multiple times for the same complaints. Still has swelling to left foot. Continuously nauseated. And is out of her bipolar medications. /84   Pulse (!) 107   Temp 36.6 °C (97.9 °F)   Resp 17   Wt 114.3 kg (252 lb)   SpO2 96%   BMI 37.21 kg/m²

## 2017-10-02 NOTE — DISCHARGE INSTRUCTIONS
You were seen and evaluated in the Emergency Department at [facility] [place] for:     Headache and chronic pain.     You had the following tests and studies:    Blood tests were stable today!    You received the following medications:    Reglan, benadryl, gabapentin, and IV fluids.     -------------------    Please make sure to follow up with:    Your primary care doctor TOMORROW.   ----------------------------    We always encourage patients to return IMMEDIATELY if they have:  Increased or changing pain, passing out, fevers over 100.4 (taken in your mouth or rectally) for more than 2 days, redness or swelling of skin or tissues, feeling like your heart is beating fast, chest pain that is new or worsening, trouble breathing, feeling like your throat is closing up and can not breath, inability to walk, weakness of any part of your body, new dizziness, severe bleeding that won't stop from any part of your body, if you can't eat or drink, or if you have any other concerns.   If you feel worse, please know that you can always return with any questions, concerns, worse symptoms, or you are feeling unsafe. We certainly cannot say for sure that we have ruled out every illness or dangerous disease, but we feel that at this specific time, your exam, tests, and vital signs like heart rate and blood pressure are safe for discharge.         Headache   A headache is pain or discomfort felt around the head or neck area. The specific cause of a headache may not be found. There are many causes and types of headaches. A few common ones are:  · Tension headaches.  · Migraine headaches.  · Cluster headaches.  · Chronic daily headaches.  HOME CARE INSTRUCTIONS   · Keep all follow-up appointments with your health care provider or any specialist referral.  · Only take over-the-counter or prescription medicines for pain or discomfort as directed by your health care provider.  · Lie down in a dark, quiet room when you have a  headache.  · Keep a headache journal to find out what may trigger your migraine headaches. For example, write down:  ¨ What you eat and drink.  ¨ How much sleep you get.  ¨ Any change to your diet or medicines.  · Try massage or other relaxation techniques.  · Put ice packs or heat on the head and neck. Use these 3 to 4 times per day for 15 to 20 minutes each time, or as needed.  · Limit stress.  · Sit up straight, and do not tense your muscles.  · Quit smoking if you smoke.  · Limit alcohol use.  · Decrease the amount of caffeine you drink, or stop drinking caffeine.  · Eat and sleep on a regular schedule.  · Get 7 to 9 hours of sleep, or as recommended by your health care provider.  · Keep lights dim if bright lights bother you and make your headaches worse.  SEEK MEDICAL CARE IF:   · You have problems with the medicines you were prescribed.  · Your medicines are not working.  · You have a change from the usual headache.  · You have nausea or vomiting.  SEEK IMMEDIATE MEDICAL CARE IF:   · Your headache becomes severe.  · You have a fever.  · You have a stiff neck.  · You have loss of vision.  · You have muscular weakness or loss of muscle control.  · You start losing your balance or have trouble walking.  · You feel faint or pass out.  · You have severe symptoms that are different from your first symptoms.     This information is not intended to replace advice given to you by your health care provider. Make sure you discuss any questions you have with your health care provider.     Document Released: 12/18/2006 Document Revised: 05/03/2016 Document Reviewed: 01/02/2013  [employer] Patient Education ©2016 [employer]      Chronic Pain  Chronic pain can be defined as pain that is off and on and lasts for 3-6 months or longer. Many things cause chronic pain, which can make it difficult to make a diagnosis. There are many treatment options available for chronic pain. However, finding a treatment that works  well for you may require trying various approaches until the right one is found. Many people benefit from a combination of two or more types of treatment to control their pain.  SYMPTOMS   Chronic pain can occur anywhere in the body and can range from mild to very severe. Some types of chronic pain include:  · Headache.  · Low back pain.  · Cancer pain.  · Arthritis pain.  · Neurogenic pain. This is pain resulting from damage to nerves.   People with chronic pain may also have other symptoms such as:  · Depression.  · Anger.  · Insomnia.  · Anxiety.  DIAGNOSIS   Your health care provider will help diagnose your condition over time. In many cases, the initial focus will be on excluding possible conditions that could be causing the pain. Depending on your symptoms, your health care provider may order tests to diagnose your condition. Some of these tests may include:   · Blood tests.    · CT scan.    · MRI.    · X-rays.    · Ultrasounds.    · Nerve conduction studies.    You may need to see a specialist.   TREATMENT   Finding treatment that works well may take time. You may be referred to a pain specialist. He or she may prescribe medicine or therapies, such as:   · Mindful meditation or yoga.  · Shots (injections) of numbing or pain-relieving medicines into the spine or area of pain.  · Local electrical stimulation.  · Acupuncture.    · Massage therapy.    · Aroma, color, light, or sound therapy.    · Biofeedback.    · Working with a physical therapist to keep from getting stiff.    · Regular, gentle exercise.    · Cognitive or behavioral therapy.    · Group support.    Sometimes, surgery may be recommended.   HOME CARE INSTRUCTIONS   · Take all medicines as directed by your health care provider.    · Lessen stress in your life by relaxing and doing things such as listening to calming music.    · Exercise or be active as directed by your health care provider.    · Eat a healthy diet and include things such as  vegetables, fruits, fish, and lean meats in your diet.    · Keep all follow-up appointments with your health care provider.    · Attend a support group with others suffering from chronic pain.  SEEK MEDICAL CARE IF:   · Your pain gets worse.    · You develop a new pain that was not there before.    · You cannot tolerate medicines given to you by your health care provider.    · You have new symptoms since your last visit with your health care provider.    SEEK IMMEDIATE MEDICAL CARE IF:   · You feel weak.    · You have decreased sensation or numbness.    · You lose control of bowel or bladder function.    · Your pain suddenly gets much worse.    · You develop shaking.  · You develop chills.  · You develop confusion.  · You develop chest pain.  · You develop shortness of breath.    MAKE SURE YOU:  · Understand these instructions.  · Will watch your condition.  · Will get help right away if you are not doing well or get worse.     This information is not intended to replace advice given to you by your health care provider. Make sure you discuss any questions you have with your health care provider.     Document Released: 09/09/2003 Document Revised: 08/20/2014 Document Reviewed: 06/13/2014  [employer] Interactive Patient Education ©2016 [employer]

## 2017-10-02 NOTE — ED NOTES
VSS.  Discharge instructions and prescriptions- verbalizes understanding.   Ambulatory to lobby with steady gait.

## 2017-10-02 NOTE — ED PROVIDER NOTES
ED Provider Note    CHIEF COMPLAINT  Chief Complaint   Patient presents with   • Foot Swelling   • Nausea   • Medication Refill       HPI  Cortney Gavin is a 47 y.o. female who presents To the emergency department with multiple complaints including not feeling well left ankle pain and headaches on and off for at least 6 months. This is the 3rd time the patient and seen in the emergency department in the last 36 hours. She's had a full workup including 2 sets of laboratory analysis as well as an ultrasound of her left lower extremity to evaluate for DVT. I asked the patient where she's gone in between his visits and she states she is homeless so she just wanted around until she decided to come back. She does have follow-up with her primary care today at 1 PM. She denies any chest pain nausea vomiting shortness of breath syncope weakness numbness or tingling.    REVIEW OF SYSTEMS  See HPI for further details. All other systems are negative.     PAST MEDICAL HISTORY   has a past medical history of Arthritis; Back pain; Bipolar 1 disorder (CMS-HCC); Chronic back pain; Schizophrenia (CMS-HCC); and Seizure (CMS-HCC).    SOCIAL HISTORY  Social History     Social History Main Topics   • Smoking status: Never Smoker   • Smokeless tobacco: Never Used   • Alcohol use No   • Drug use:      Types: Inhaled      Comment: thc=  last used 1 month ago   • Sexual activity: No       SURGICAL HISTORY   has a past surgical history that includes ankle orif (Left, 2015); knee arthrotomy (Bilateral, 1980's); tubal ligation (1990's); hardware removal ortho (Left, 8/18/2017); and ankle orif (Left, 8/18/2017).    CURRENT MEDICATIONS  Home Medications    **Home medications have not yet been reviewed for this encounter**         ALLERGIES  Allergies   Allergen Reactions   • Norco [Apap-Fd&C Yellow #10 [place]-Hydrocodone] Itching       PHYSICAL EXAM  VITAL SIGNS: /84   Pulse (!) 107   Temp 36.6 °C (97.9 °F)   Resp 17   Wt  114.3 kg (252 lb)   LMP 09/30/2017   SpO2 96%   BMI 37.21 kg/m²     Pulse ox interpretation: I interpret this pulse ox as normal.  Constitutional: Alert in no apparent distress.  HENT: Normocephalic, Atraumatic  Eyes: PERRL. Conjunctiva normal, non-icteric.   Heart: Regular rate and rythm, no murmurs.    Lungs: Clear to auscultation bilaterally. No resp distress, breath sounds equal  Abdomen: Non-tender, non-distended, normal bowel sounds  EXT: Left ankle multiple old healed scars with some mild swelling and tenderness to palpation and no erythema no warmth she does have limited range of motion secondary to pain DP pulses 2+ sensation intact to light touch  Skin: Warm, Dry, No erythema, No rash.   Neurologic: Alert and oriented, Grossly non-focal. mildly anxious         COURSE & MEDICAL DECISION MAKING  Pertinent Labs & Imaging studies reviewed. (See chart for details)    Patient is 47-year-old female with bipolar disorder and chronic joint pain likely secondary to her old fracture and addition and then removal of hardware and there is no evidence of infection at this time. I discussed with her she will not be receiving any narcotics for me as she has a primary care appointment today she can't get her medications refilled and discussed pain management with her primary care doctor. She understands and feels comfortable with the plan    /70   Pulse 89   Temp 36.6 °C (97.9 °F)   Resp 18   Wt 114.3 kg (252 lb)   LMP 09/30/2017   SpO2 97%   BMI 37.21 kg/m²      The patient will return for new or worsening symptoms and is stable at the time of discharge.    The patient is referred to a primary physician for blood pressure management, diabetic screening, and for all other preventative health concerns.    DISPOSITION:  Patient will be discharged home in stable condition.    FOLLOW UP:  Rubi Ruby M.D.  [address]  [place] [address] [phone]    Go on 10/2/2017  at 1pm      OUTPATIENT  MEDICATIONS:  Discharge Medication List as of 10/2/2017  2:59 AM            FINAL IMPRESSION  1. Chronic pain of left ankle    2. Noncompliance with medications    3. Anxiety                 Electronically signed by: Ania Rosa, 10/2/2017 2:26 AM    This dictation has been created using voice recognition software and/or scribes. The accuracy of the dictation is limited by the abilities of the software and the expertise of the scribes. I expect there may be some errors of grammar and possibly content. I made every attempt to manually correct the errors within my dictation. However, errors related to voice recognition software and/or scribes may still exist and should be interpreted within the appropriate context.

## 2017-10-02 NOTE — ED NOTES
"Pt. Visibly upset in triage. Asked Pt. Was was wrong and pt. Stated that her foot was swollen and that she was in significant pain from a \"bad surgery.\" Triage RN notified about this new complaint.  "

## 2017-10-02 NOTE — ED NOTES
Pt ambulatory to room 14 with scooter. Agree with triage note. Pt has walking boot to LLE PTA. Hx of surgery to left ankle August 18th per pt. Mild swelling noted, no s/s of infection.

## 2017-10-02 NOTE — ED NOTES
Chief Complaint   Patient presents with   • Headache     Pt reports symptoms to be on and off for 6 mos/ pt reports self medicated with ibuprophen not working/ pt states HA to be diffuse   • Nausea     Explained to pt triage process, made pt aware to tell this RN of any changes/concerns, pt verbalized understanding of process and instructions given. Pt to ER lobby.

## 2017-10-02 NOTE — ED PROVIDER NOTES
"ED PROVIDER NOTE     Scribed for Caleb Watts M.D. by Ashleigh Mcintyre. 10/1/2017, 9:22 PM.    CHIEF COMPLAINT  Chief Complaint   Patient presents with   • Headache     Pt reports symptoms to be on and off for 6 mos/ pt reports self medicated with ibuprophen not working/ pt states HA to be diffuse   • Nausea     Primary care provider: Rubi Ruby M.D.  Means of arrival: walk-in  History obtained from: patient  History limited by: none    HPI   Cortney Gavin is a 47 y.o. Female with a history of psychiatric disorders and chronic pain who presents with intermittent headaches onset 6 months ago with associated occasional nausea. The headaches are not triggered by anything specific or improved by anything. The length of the headache varies each time, with them occurring every other day and no exacerbations to the headache have been noticed. Patient reports no recent traumas or falls where she might have struck her head. She is describing a \"warmth\" to the right side of her face, however, she would not describe this as a fever, just a sensation that occurs along with her headaches.      At this time patient is also describing extreme shooting pains in her lower extremities which have been present for several weeks with associated numbness.She has undergone several orthopedic surgeries in her lower extremities and takes daily medications to control her chronic pain including percocet and gabapentin.   Patient is also describing sores on her back that she believes are bites of some sort which are causing discomfort. She was evaluated here yesterday for these sores and headaches and foot pain, and states that the hospital would not do as she wished and admit her, as nothing acute was found. Patient is currently homeless and trying to find somewhere to stay.     Patient takes gabapentin and trazodone daily, however, she ran out of the gabapentin a few days ago and has not been able to refill them.  No " complaints of fever or chills, vision changes.    REVIEW OF SYSTEMS  Constitutional: Negative for fever and chills.   HENT: Positive headache. No sore throat.  Eyes: Negative for vision changes   Gastrointestinal: positive for nausea or vomiting.   Musculoskeletal: positive for shooting leg pains and chronic ankle/foot/knee pain, no new falls or injuries.   Skin: positive for sores along her back causing discomfort  Neurological: positive for headaches and bilateral LE numbness  Psych: No Si/HI/AVH  All other systems reviewed and are negative.    PAST MEDICAL HISTORY   has a past medical history of Arthritis; Back pain; Bipolar 1 disorder (CMS-[facility]); Chronic back pain; Schizophrenia (CMS-[facility]); and Seizure (CMS-HCC).    PAST FAMILY HISTORY  Family History   Problem Relation Age of Onset   • Heart Disease Mother      a fib   • Heart Disease Maternal Aunt      chf   • Cancer Maternal Grandfather      lung cancer, smoker   • Diabetes Neg Hx        SOCIAL HISTORY  Social History     Social History Main Topics   • Smoking status: Never Smoker   • Smokeless tobacco: Never Used   • Alcohol use No   • Drug use: No      Comment: thc=  last used 2/2017, tried once    • Sexual activity: No       SURGICAL HISTORY   has a past surgical history that includes ankle orif (Left, 2015); knee arthrotomy (Bilateral, 1980's); tubal ligation (1990's); hardware removal ortho (Left, 8/18/2017); and ankle orif (Left, 8/18/2017).    CURRENT MEDICATIONS  Home Medications     Reviewed by Miya Vail R.N. (Registered Nurse) on 10/01/17 at 2119  Med List Status: Not Addressed   Medication Last Dose Status   buPROPion SR (WELLBUTRIN-SR) 100 MG TABLET SR 12 HR  Active   gabapentin (NEURONTIN) 300 MG Cap  Active   ibuprofen (MOTRIN) 800 MG Tab  Active   oxycodone-acetaminophen (PERCOCET) 5-325 MG Tab  Active   potassium chloride SA (KDUR) 20 MEQ Tab CR  Active   sertraline (ZOLOFT) 25 MG tablet  Active   trazodone (DESYREL) 150 MG Tab   "Active                ALLERGIES  Allergies   Allergen Reactions   • Norco [Apap-Fd&C Yellow #10 [place]-Hydrocodone] Itching       PHYSICAL EXAM  VITAL SIGNS: /95   Pulse 98   Temp 36.6 °C (97.8 °F)   Resp 18   Ht 1.753 m (5' 9\")   Wt 114.3 kg (252 lb)   SpO2 96%   BMI 37.21 kg/m²      Pulse ox interpretation: I interpret this pulse ox as normal.  Constitutional: Well developed, unkempt, moderate distress.  HEENT: Normocephalic, atraumatic. Posterior pharynx clear, mucous membranes moist.  Eyes:  EOMI. Normal sclera. PERRL 3-2.  Neck: Supple, Full range of motion, nontender.  Chest/Pulmonary: Clear to ausculation bilaterally, no wheezes or rhonchi.  Cardio: Regular rate and rhythm, no murmur.   Abdomen: Soft, nontender, no rebound, guarding, or masses.  Back: No CVA tenderness, nontender midline, no step offs.  Musculoskeletal: swelling to left foot, with described shooting pains to bilateral LE with palpation, surgical scars present to bilateral knees, clean, dry and intact,  Neurovascularly intact with 2+ dp/pt pulses.   Neuro: Clear speech, appropriate, cooperative, cranial nerves II-XII grossly intact. [place], sensation intact.   Psych: Normal mood and affect.    DIAGNOSTIC STUDIES / PROCEDURES    LABS & EKG  Results for orders placed or performed during the hospital encounter of 10/01/17   BASIC METABOLIC PANEL   Result Value Ref Range    Sodium 137 135 - 145 mmol/L    Potassium 3.6 3.6 - 5.5 mmol/L    Chloride 106 96 - 112 mmol/L    Co2 23 20 - 33 mmol/L    Glucose 86 65 - 99 mg/dL    Bun 5 (L) 8 - 22 mg/dL    Creatinine 0.57 0.50 - 1.40 mg/dL    Calcium 9.1 8.5 - 10.5 mg/dL    Anion Gap 8.0 0.0 - 11.9   ESTIMATED GFR   Result Value Ref Range    GFR If African American >60 >60 mL/min/1.73 m 2    GFR If Non African American >60 >60 mL/min/1.73 m 2     COURSE & MEDICAL DECISION MAKING    This is a 47 y.o. female who presents with Headache and chronic the pain. Seen multiple times in the last several " days for this, most recently this morning with a negative DVT ultrasound.    Differential Diagnosis includes but is not limited to:  Tension migraine, mass, chronic pain, postop pain, occult infection, lyte abnormality    Review of past medical records shows a DVT US completed this morning which was negative. She was also evaluated during 2 separate visits on September 29th.    ED Course:  9:44 PM Patient was evaluated at bedside. I explained that medications would be ordered to try and resolve her symptoms and informed her that I would evaluate her most recent ED visits as well as evaluate today with a BMP. Symptoms will be treated with 10mg IV Reglan, 12.5 IV Benadryl, 300mg Gabapentin capsule and IV fluids to treat presumed dehydration since she's had minimal PO intake today.    Labs with no e/o acidosis, stable lytes doubt hypoK.     10:45 PM I re-evaluated patient at bedside. Patient reports feeling improved at this time. I explained that we would let her finish the fluids and discharge her. Patient understands and agrees with discharge. Offered social work but pt declined, report she already has shelter resources from prior ED visits this weekend.    I have concern for malingering for housing, as well as poorly managed chronic pain. The patient at present does not merit involuntary mental health hold and understands the need to f/u with her doctor tomorrow to reassess her pain management.     No fever, no redness doubt occult infection or bacteremia. No new trauma doubt fracture or dislocation. The patient has an assistive device and is stable for dc.     Medications   metoclopramide (REGLAN) injection 10 mg (10 mg Intravenous Given 10/1/17 2205)   diphenhydrAMINE (BENADRYL) injection 12.5 mg (12.5 mg Intravenous Given 10/1/17 2205)   NS infusion 1,000 mL (0 mL Intravenous Stopped 10/1/17 2300)   gabapentin (NEURONTIN) capsule 300 mg (300 mg Oral Given 10/1/17 2205)       FINAL IMPRESSION  1. Nonintractable  headache, unspecified chronicity pattern, unspecified headache type    2. Chronic pain of left ankle    3. Bipolar 1 disorder (CMS-HCC)    4. Primary insomnia    5. Chronic pain of both knees        PRESCRIPTIONS  Discharge Medication List as of 10/1/2017 11:38 PM          FOLLOW UP  Rbui Ruby M.D.  [address]  [place] [address] [phone]    Schedule an appointment as soon as possible for a visit in 1 day      [facility], Emergency Dept  [address] [address] [phone]  Today  If symptoms worsen    -DISCHARGE-    Pertinent Lab studies reviewed and verified by myself, as well as nursing notes and the patient's past medical, family, and social histories (See chart for details).    Results, exam findings, clinical impression, presumed diagnosis, treatment options, and strict return precautions were discussed with the patient, and they verbalized understanding, agreed with, and appreciated the plan of care.    IAshleigh (Scribe), am scribing for, and in the presence of, Caleb Watts M.D..    Electronically signed by: Ashleigh Mcintyre (Scribe), 10/1/2017    Caleb RIVAS M.D. personally performed the services described in this documentation, as scribed by Ashleigh Mcintyre in my presence, and it is both accurate and complete.    The note accurately reflects work and decisions made by me.  Caleb Watts  10/2/2017  1:06 AM

## 2017-10-02 NOTE — ED NOTES
"Asked pt if she was here earlier today and she states \"today or yesterday, I don't remember\"    "

## 2017-10-07 VITALS
DIASTOLIC BLOOD PRESSURE: 75 MMHG | WEIGHT: 239 LBS | OXYGEN SATURATION: 97 % | TEMPERATURE: 98.7 F | HEIGHT: 69 IN | SYSTOLIC BLOOD PRESSURE: 117 MMHG | BODY MASS INDEX: 35.4 KG/M2 | RESPIRATION RATE: 18 BRPM | HEART RATE: 81 BPM

## 2017-10-07 PROCEDURE — 99284 EMERGENCY DEPT VISIT MOD MDM: CPT

## 2017-10-07 ASSESSMENT — PAIN SCALES - GENERAL: PAINLEVEL_OUTOF10: 10

## 2017-10-08 ENCOUNTER — HOSPITAL ENCOUNTER (EMERGENCY)
Facility: MEDICAL CENTER | Age: 47
End: 2017-10-08
Attending: EMERGENCY MEDICINE
Payer: MEDICARE

## 2017-10-08 DIAGNOSIS — F99 PSYCHIATRIC DISORDER: ICD-10-CM

## 2017-10-08 DIAGNOSIS — Z76.0 MEDICATION REFILL: ICD-10-CM

## 2017-10-08 DIAGNOSIS — G89.29 OTHER CHRONIC PAIN: ICD-10-CM

## 2017-10-08 NOTE — ED NOTES
Pt Given discharge instructions/ home care instructions, Pt verbalized understanding of instructions given, pt wheeled to  to ER lobby with cab voucher.

## 2017-10-08 NOTE — DISCHARGE INSTRUCTIONS
Follow-up with primary care Monday for reevaluation, medication management and refills and close blood pressure monitoring. Primary care can also assist with  and placement.  [facility] may assist with  and placement as well.    Return to the emergency department for altered mental status, suicidal or homicidal ideation, hallucinations, intractable pain or other new concerns.    Pain Management, Chronic  You have a painful condition that has required frequent use of narcotic-type pain medicine. We would like to see that you receive the best possible care for your problem. To achieve this, you must have a personal physician who can supervise a treatment plan for you. You may locate a personal physician on your own or contact one of the doctors whose name has been given to you.  If your physician determines that you need to visit the Emergency Department for pain control, that doctor should provide you with a pain contract. This is a letter from your doctor which describes what pain medicine you may receive, how much and how often. You sign it agreeing to the terms of the treatment plan. Bring this with each time you come to this facility. It will help the Emergency Physician provide the proper treatment for you with minimal delay.  Please Note: In the future you may not be able to receive narcotic pain medicine from this facility without a pain contract or telephone approval from your personal physician.  Document Released: 08/10/2005 Document Revised: 03/11/2013 Document Reviewed: 12/14/2009  [employer]® Patient Information ©2013 [employer].    Bipolar Disorder  Bipolar disorder is a mental illness. The term bipolar disorder actually is used to describe a group of disorders that all share varying degrees of emotional highs and lows that can interfere with daily functioning, such as work, school, or relationships. Bipolar disorder also can lead to drug abuse,  hospitalization, and suicide.  The emotional highs of bipolar disorder are periods of elation or irritability and high energy. These highs can range from a mild form (hypomania) to a severe form (alvarado). People experiencing episodes of hypomania may appear energetic, excitable, and highly productive. People experiencing alvarado may behave impulsively or erratically. They often make poor decisions. They may have difficulty sleeping. The most severe episodes of alvarado can involve having very distorted beliefs or perceptions about the world and seeing or hearing things that are not real (psychotic delusions and hallucinations).   The emotional lows of bipolar disorder (depression) also can range from mild to severe. Severe episodes of bipolar depression can involve psychotic delusions and hallucinations.  Sometimes people with bipolar disorder experience a state of mixed mood. Symptoms of hypomania or alvarado and depression are both present during this mixed-mood episode.  SIGNS AND SYMPTOMS  There are signs and symptoms of the episodes of hypomania and alvarado as well as the episodes of depression. The signs and symptoms of hypomania and alvarado are similar but vary in severity. They include:  · Inflated self-esteem or feeling of increased self-confidence.  · Decreased need for sleep.  · Unusual talkativeness (rapid or pressured speech) or the feeling of a need to keep talking.  · Sensation of racing thoughts or constant talking, with quick shifts between topics that may or may not be related (flight of ideas).  · Decreased ability to focus or concentrate.  · Increased purposeful activity, such as work, studies, or social activity, or nonproductive activity, such as pacing, squirming and fidgeting, or finger and toe tapping.  · Impulsive behavior and use of poor judgment, resulting in high-risk activities, such as having unprotected sex or spending excessive amounts of money.  Signs and symptoms of depression include the  following:   · Feelings of sadness, hopelessness, or helplessness.  · Frequent or uncontrollable episodes of crying.  · Lack of feeling anything or caring about anything.  · Difficulty sleeping or sleeping too much.   · Inability to enjoy the things you used to enjoy.    · Desire to be alone all the time.    · Feelings of guilt or worthlessness.   · Lack of energy or motivation.    · Difficulty concentrating, remembering, or making decisions.   · Change in appetite or weight beyond normal fluctuations.  · Thoughts of death or the desire to harm yourself.  DIAGNOSIS   Bipolar disorder is diagnosed through an assessment by your caregiver. Your caregiver will ask questions about your emotional episodes. There are two main types of bipolar disorder. People with type I bipolar disorder have manic episodes with or without depressive episodes. People with type II bipolar disorder have hypomanic episodes and major depressive episodes, which are more serious than mild depression. The type of bipolar disorder you have can make an important difference in how your illness is monitored and treated.  Your caregiver may ask questions about your medical history and use of alcohol or drugs, including prescription medication. Certain medical conditions and substances also can cause emotional highs and lows that resemble bipolar disorder (secondary bipolar disorder).   TREATMENT   Bipolar disorder is a long-term illness. It is best controlled with continuous treatment rather than treatment only when symptoms occur. The following treatments can be prescribed for bipolar disorders:  · Medication--Medication can be prescribed by a doctor that is an expert in treating mental disorders (psychiatrists). Medications called mood stabilizers are usually prescribed to help control the illness. Other medications are sometimes added if symptoms of alvarado, depression, or psychotic delusions and hallucinations occur despite the use of a mood  stabilizer.  · Talk therapy--Some forms of talk therapy are helpful in providing support, education, and guidance.  A combination of medication and talk therapy is best for managing the disorder over time. A procedure in which electricity is applied to your brain through your scalp (electroconvulsive therapy) is used in cases of severe alvarado when medication and talk therapy do not work or work too slowly.     This information is not intended to replace advice given to you by your health care provider. Make sure you discuss any questions you have with your health care provider.     Document Released: 03/26/2002 Document Revised: 01/08/2016 Document Reviewed: 01/13/2014  [employer] Interactive Patient Education ©2016 [employer]

## 2017-10-08 NOTE — ED NOTES
"Pt brought to room in wheel chair. Pt states 'The only thing that is wrong with me is I want my medications\" Pt states that she is out of her tegretol, trazadone, wellbutrin, and needs it refilled \"now\" Pt states that she is angry, but isn't  Going to hurt any one. Pt denies SI/HI. Ankit continue to monitor.   "

## 2017-10-08 NOTE — DISCHARGE PLANNING
Medical Social Work     Referral: Pt requesting to speak to SW     Dr. Mei called [facility] stating that the pt is requesting to speak to SW about group homes and resources. SW spoke with pt at bedside, the pt stated she lives at a group home but is refusing to go back because she did not like the group home. SW explained that when discharging her option are to go back to the group home or go to the shelter. The pt stated she wanted to go to the shelter. [facility]ILLAN called and Mariangel spoke with Mariangel. Mariangel stated that there are no beds be the pt could stay in the day room. DILLAN spoke with the pt and explain this to her. The pt stated she would go to the shelter. [facility] called [facility] and the pt does not have the transportation benefit at this time but could call during business hours to set it up. SW provided a cab voucher but did explain to the pt how to set up the transportation benefit and also explained that next SW may not be able to provided a cab voucher to the pt. The pt acknowledged understanding. RN and Dr. Mei updated about the pt.     Plan: SW will remain available for support.

## 2017-10-08 NOTE — ED PROVIDER NOTES
"ED Provider Note    CHIEF COMPLAINT  Chief Complaint   Patient presents with   • Leg Pain   • Medication Refill       [facility] [place]  Cortney Gavin is a 47 y.o. female who presents To the emergency department by ambulance through triage in a wheelchair for medication refill and social service consult. Patient requested Tegretol, Wellbutrin and trazodone but admits that she has been out of these medications for months. She cannot remember if she saw her primary care physician as she was scheduled to do so earlier this week, and cannot remember why she did not ask for medication refill at that time. Patient denies seizure disorder, states Tegretol \"from my psychiatrist.\" Patient also requesting to see  \"because I need a new group home or an apartment on my own tonight.\" Patient states she does not feel safe in her current group home is less been homeless and wandering around town the last few days.     Patient denies any suicidal or homicidal ideations. Denies hallucinations.    Patient with chronic pain, chronic low leg pain after hardware and subsequent removal, however she does not mention this nor current discomfort during this evaluation.    REVIEW OF SYSTEMS  See HPI for further details.     PAST MEDICAL HISTORY   has a past medical history of Arthritis; Back pain; Bipolar 1 disorder (CMS-[facility]); Chronic back pain; Schizophrenia (CMS-[facility]); and Seizure (CMS-HCC).    SOCIAL HISTORY  Social History     Social History Main Topics   • Smoking status: Never Smoker   • Smokeless tobacco: Never Used   • Alcohol use No   • Drug use:      Types: Inhaled      Comment: thc=  last used 1 month ago   • Sexual activity: No       SURGICAL HISTORY   has a past surgical history that includes ankle orif (Left, 2015); knee arthrotomy (Bilateral, 1980's); tubal ligation (1990's); hardware removal ortho (Left, 8/18/2017); and ankle orif (Left, 8/18/2017).    CURRENT MEDICATIONS  Home Medications    **Home medications " "have not yet been reviewed for this encounter**         ALLERGIES  Allergies   Allergen Reactions   • Norco [Apap-Fd&C Yellow #10 [place]-Hydrocodone] Itching       PHYSICAL EXAM  VITAL SIGNS: /75   Pulse 81   Temp 37.1 °C (98.7 °F) (Temporal)   Resp 18   Ht 1.753 m (5' 9\")   Wt 108.4 kg (239 lb)   LMP 09/30/2017   SpO2 97%   BMI 35.29 kg/m²   Pulse ox interpretation: I interpret this pulse ox as normal.  Constitutional: Alert in no apparent distress.  HENT: Normocephalic, atraumatic. Bilateral external ears normal, Nose normal. Moist mucous membranes.    Eyes: Conjunctiva normal.   Neck: Normal range of motion, Supple.  Cardiovascular:Normal peripheral perfusion.  Thorax & Lungs: Nonlabored respirations.  Skin: Warm, Dry.  Musculoskeletal: Left lower leg and walking boot. Patient transfers from wheelchair and walks around exam room without difficulty.  Neurologic: Alert , no gross focal deficit noted.  Psychiatric: Odd affect. Pressured speech. Histrionic. Denies suicidal or homicidal ideation.      COURSE & MEDICAL DECISION MAKING  Nursing notes and vital signs were reviewed. (See chart for details)  The patients records were reviewed, history was obtained from the patient;     Medical record review: Labs normal twice in the last week. Ultrasound left lower extremity negative for DVT. Medication refill and narcotic refill refused on previous visits this patient was supposed to see her primary care physician this week.    Evaluation for medication refill is declined. Patient requesting Tegretol, Wellbutrin and trazodone of which she has been noncompliant for more than 2 months. Strongly recommended that she follow-up with primary care or psychiatry for this medication if still indicated him for close monitoring and titration while taking it. Denies suicidal or homicidal ideations. Odd affect but appears to be able to care for herself. No indication for legal hold.    Patient requesting  " evaluation. She has been seen at bedside by , Colette, was made her aware that new group home placement or apartment finding is not an option at this time, patient has agreed to go to the women's shelter will be sent there by [employer]      Patient is stable for discharge at this time, anticipatory guidance provided,close follow-up is strongly encouraged this week with primary care, and strict ED return instructions have been detailed. Patient is agreeable to the disposition and plan.    Patient's blood pressure was elevated in the emergency department, and has been referred to primary care for close monitoring.    FINAL IMPRESSION  (Z76.0) Medication refill  (F99) Psychiatric disorder  (G89.29) Other chronic pain      Electronically signed by: Ania Mei, 10/8/2017 1:19 AM      This dictation was created using voice recognition software. The accuracy of the dictation is limited to the abilities of the software. I expect there may be some errors of grammar and possibly content. The nursing notes were reviewed and certain aspects of this information were incorporated into this note.

## 2017-10-08 NOTE — ED NOTES
"Chief Complaint   Patient presents with   • Leg Pain   • Medication Refill     Patient BIB [facility] to triage. Patient fractured her left lower extremity 1.5 months ago, ran out of oxycodone is having continuous pain. Patient ran out of all her medications and would also like some of them to be changed. Patient has been staying at the [facility] and would like to have a  get her an apartment and home health to come and take care of her. /75   Pulse 81   Temp 37.1 °C (98.7 °F) (Temporal)   Resp 18   Ht 1.753 m (5' 9\")   Wt 108.4 kg (239 lb)   LMP 09/30/2017   SpO2 97%   BMI 35.29 kg/m²     "

## 2017-11-01 ENCOUNTER — TELEPHONE (OUTPATIENT)
Dept: MEDICAL GROUP | Facility: PHYSICIAN GROUP | Age: 47
End: 2017-11-01

## 2017-11-01 NOTE — TELEPHONE ENCOUNTER
ANNUAL WELLNESS VISIT PRE-VISIT PLANNING     1.  Reviewed note from last office visit with PCP: YES    2.  If any orders were placed at last visit, do we have Results/Consult Notes?        •  Labs - Labs ordered, completed on 08/01/17 and results are in chart.   Note: If patient appointment is for lab review and patient did not complete labs,                check with provider if OK to reschedule patient until labs completed.       •  Imaging - Imaging ordered, completed and results are in chart.       •  Referrals - Referral ordered, patient was seen and consult notes are in chart. Care Teams updated  YES.    3.  Immunizations were updated in [email or web address] using WebIZ?: Yes       •  WebIZ Recommendations: TDAP       •  Is patient due for Tdap? YES. Patient was notified of copay/out of pocket cost.       •  Is patient due for Shingles? NO     4.  Patient is due for the following Health Maintenance Topics:   Health Maintenance Due   Topic Date Due   • Annual Wellness Visit  1970   • IMM DTaP/Tdap/Td Vaccine (1 - Tdap) 07/06/1990   • PAP SMEAR  07/06/1992   • MAMMOGRAM  07/06/2011       - Patient has completed FLU Immunization(s) per WebIZ. Chart has been updated.      5.  Reviewed/Updated the following with patient:       •   Preferred Pharmacy? YES       •   Preferred Lab? YES       •   Preferred Communication? YES       •   Allergies? YES       •   Medications? YES. Was Abstract Encounter opened and chart updated? YES       •   Social History? YES. Was Abstract Encounter opened and chart updated? YES       •   Family History (document living status of immediate family members and if + hx of cancer, diabetes, hypertension, hyperlipidemia, heart attack, stroke) YES. Was Abstract Encounter opened and chart updated? YES    6.  Care Team Updated:       •   DME Company (gait device, O2, CPAP, etc.): YES [employer]        •   Other Specialists (eye doctor, derm, GYN, cardiology, endo, etc): YES    7.  Patient has the following  Care Path diagnoses on Problem List:  NONE    8.  Specialty Comments was updated with diagnosis information provided by SCP: NO    9.  Patient was advised: “This is a free wellness visit. The provider will screen for medical conditions to help you stay healthy. If you have other concerns to address you may be asked to discuss these at a separate visit or there may be an additional fee.”     6.  Patient was informed to arrive 15 min prior to their scheduled appointment and bring in their medication bottles.

## 2017-11-08 ENCOUNTER — OFFICE VISIT (OUTPATIENT)
Dept: MEDICAL GROUP | Facility: PHYSICIAN GROUP | Age: 47
End: 2017-11-08
Payer: MEDICARE

## 2017-11-08 VITALS
SYSTOLIC BLOOD PRESSURE: 110 MMHG | TEMPERATURE: 98.4 F | RESPIRATION RATE: 16 BRPM | HEIGHT: 68 IN | HEART RATE: 79 BPM | WEIGHT: 249.12 LBS | OXYGEN SATURATION: 93 % | DIASTOLIC BLOOD PRESSURE: 65 MMHG | BODY MASS INDEX: 37.76 KG/M2

## 2017-11-08 DIAGNOSIS — G89.29 CHRONIC PAIN OF LEFT ANKLE: ICD-10-CM

## 2017-11-08 DIAGNOSIS — M25.572 CHRONIC PAIN OF LEFT ANKLE: ICD-10-CM

## 2017-11-08 DIAGNOSIS — Z12.31 ENCOUNTER FOR SCREENING MAMMOGRAM FOR BREAST CANCER: ICD-10-CM

## 2017-11-08 DIAGNOSIS — M25.551 PAIN OF RIGHT HIP JOINT: ICD-10-CM

## 2017-11-08 DIAGNOSIS — N95.9 POST MENOPAUSAL PROBLEMS: ICD-10-CM

## 2017-11-08 DIAGNOSIS — F51.01 PRIMARY INSOMNIA: ICD-10-CM

## 2017-11-08 DIAGNOSIS — G89.29 CHRONIC PAIN OF BOTH KNEES: ICD-10-CM

## 2017-11-08 DIAGNOSIS — R41.3 MEMORY PROBLEM: ICD-10-CM

## 2017-11-08 DIAGNOSIS — M54.6 CHRONIC BILATERAL THORACIC BACK PAIN: ICD-10-CM

## 2017-11-08 DIAGNOSIS — Z00.00 MEDICARE ANNUAL WELLNESS VISIT, INITIAL: ICD-10-CM

## 2017-11-08 DIAGNOSIS — E66.9 OBESITY (BMI 30-39.9): ICD-10-CM

## 2017-11-08 DIAGNOSIS — G89.29 CHRONIC BILATERAL THORACIC BACK PAIN: ICD-10-CM

## 2017-11-08 DIAGNOSIS — F31.9 BIPOLAR 1 DISORDER (HCC): ICD-10-CM

## 2017-11-08 DIAGNOSIS — M25.561 CHRONIC PAIN OF BOTH KNEES: ICD-10-CM

## 2017-11-08 DIAGNOSIS — M25.562 CHRONIC PAIN OF BOTH KNEES: ICD-10-CM

## 2017-11-08 PROCEDURE — G0402 INITIAL PREVENTIVE EXAM: HCPCS | Performed by: INTERNAL MEDICINE

## 2017-11-08 ASSESSMENT — PAIN SCALES - GENERAL: PAINLEVEL: 8=MODERATE-SEVERE PAIN

## 2017-11-08 ASSESSMENT — PATIENT HEALTH QUESTIONNAIRE - PHQ9: CLINICAL INTERPRETATION OF PHQ2 SCORE: 0

## 2017-11-08 NOTE — PROGRESS NOTES
Chief Complaint   Patient presents with   • Annual Wellness Visit         HPI:  Cortney is a 47 y.o. here for Medicare Annual Wellness Visit    Patient Active Problem List    Diagnosis Date Noted   • Chronic pain of left ankle 08/04/2017   • Pain of right hip joint 07/26/2017   • Chronic pain of both knees 07/26/2017   • Obesity (BMI 30-39.9) 07/19/2017   • Chronic bilateral thoracic back pain 07/19/2017   • Bipolar 1 disorder (CMS-HCC) 07/19/2017   • Primary insomnia 07/19/2017   • Memory problem 07/19/2017       Current Outpatient Prescriptions   Medication Sig Dispense Refill   • sertraline (ZOLOFT) 25 MG tablet Take 25 mg by mouth every day.     • oxycodone-acetaminophen (PERCOCET) 5-325 MG Tab Take 1-2 Tabs by mouth every four hours as needed. 20 Tab 0   • trazodone (DESYREL) 150 MG Tab Take 150 mg by mouth every evening.     • buPROPion SR (WELLBUTRIN-SR) 100 MG TABLET SR 12 HR Take 100 mg by mouth 2 times a day.     • gabapentin (NEURONTIN) 300 MG Cap Take 300 mg by mouth 2 Times a Day. Two tabs in the am (600mg) one tab in the evening (300mg)     • ibuprofen (MOTRIN) 800 MG Tab Take 800 mg by mouth every day.       No current facility-administered medications for this visit.         Patient is taking medications as noted in medication list.  Current supplements as per medication list.     Allergies: Norco [apap-fd&c yellow #10 [place]-hydrocodone]    Current social contact/activities: NONE    Is patient current with immunizations? No, due for TDAP. Patient is interested in receiving NONE today.    She  reports that she has never smoked. She has never used smokeless tobacco. She reports that she uses drugs, including Inhaled. She reports that she does not drink alcohol.  Counseling given: Not Answered        DPA/Advanced directive: Patient has Advanced Directive on file.     ROS:    Gait: Uses a cane  And want a order for a 4 wheel walker with seat  Ostomy: no   Other tubes: no   Amputations: no   Chronic oxygen  "use no   Last eye exam 11/17   Wears hearing aids: no   : Denies incontinence.       Little interest or pleasure in doing things?  0 - not at all  Feeling down, depressed, or hopeless? 0 - not at all  Patient Health Questionnaire Score: 0    If depressive symptoms identified deferred to follow up visit unless specifically addressed in assessment and plan.    Interpretation of PHQ-9 Total Score   Score Severity   1-4 No Depression   5-9 Mild Depression   10-14 Moderate Depression   15-19 Moderately Severe Depression   20-27 Severe Depression    Screening for Cognitive Impairment    Three Minute Recall (apple, watch, hanna)  2/3    Draw clock face with all 12 numbers set to the hand to show 10 minutes past 11 o'clock  1 5/5  If cognitive concerns identified, deferred for follow up unless specifically addressed in assessment and plan.    Fall Risk Assessment    Has the patient had two or more falls in the last year or any fall with injury in the last year?  No  If fall risk identified, deferred for follow up unless specifically addressed in assessment and plan.    Safety Assessment    Throw rugs on floor.  No  Handrails on all stairs.  Yes  Good lighting in all hallways.  Yes  Difficulty hearing.  Yes  Patient counseled about all safety risks that were identified.    Functional Assessment ADLs    Are there any barriers preventing you from cooking for yourself or meeting nutritional needs?  Yes. Cant stand for long periods of time  Are there any barriers preventing you from driving safely or obtaining transportation?  No.    Are there any barriers preventing you from using a telephone or calling for help?  No.    Are there any barriers preventing you from shopping?  Yes. Hard time walking \"legs are bad\"  Are there any barriers preventing you from taking care of your own finances?  No.    Are there any barriers preventing you from managing your medications?  No.    Are you currently engaging any exercise or physical " "activity?  Yes.  Pain while walking knee and ankle pain    Health Maintenance Summary                Annual Wellness Visit Overdue 1970     IMM DTaP/Tdap/Td Vaccine Overdue 7/6/1990     PAP SMEAR Overdue 7/6/1992     MAMMOGRAM Overdue 7/6/2011           Patient Care Team:  Rubi Ruby M.D. as PCP - General (Internal Medicine)  Nickolas Platt M.D. (Pain Management)  Judd Danielle M.D. (Psychiatry)    Social History   Substance Use Topics   • Smoking status: Never Smoker   • Smokeless tobacco: Never Used   • Alcohol use No     Family History   Problem Relation Age of Onset   • Heart Disease Mother      a fib   • Heart Disease Maternal Aunt      chf   • Cancer Maternal Grandfather      lung cancer, smoker   • Diabetes Neg Hx      She  has a past medical history of Arthritis; Back pain; Bipolar 1 disorder (CMS-HCC); Chronic back pain; Schizophrenia (CMS-HCC); and Seizure (CMS-[facility]).   Past Surgical History:   Procedure Laterality Date   • HARDWARE REMOVAL ORTHO Left 8/18/2017    Procedure: HARDWARE REMOVAL ORTHO - ANKLE;  Surgeon: Aguilar Schreoder M.D.;  Location: [facility];  Service:    • ANKLE ORIF Left 8/18/2017    Procedure: ANKLE ORIF - SYNDESMOSIS INJURY ;  Surgeon: Aguilar Schroeder M.D.;  Location: [facility];  Service:    • ANKLE ORIF Left 2015   • TUBAL LIGATION  1990's   • KNEE ARTHROTOMY Bilateral 1980's           Exam:     Blood pressure 110/65, pulse 79, temperature 36.9 °C (98.4 °F), resp. rate 16, height 1.727 m (5' 8\"), weight 113 kg (249 lb 1.9 oz), SpO2 93 %. Body mass index is 37.88 kg/m².    Hearing fair.    Dentition fair  Alert, oriented in no acute distress.  Eye contact is good, speech goal directed, affect calm      Assessment and Plan. The following treatment and monitoring plan is recommended:     Primary insomnia  Chronic, Use trazodone nightly    Pain of right hip joint  Chronic, no previous trauma. She reports that she is significant pain, but she " is smiling, communicate fluently and does not look in significant pain . XR 08/2017, normal. Follow up with pain specialist . She is askign for walker. She can well, no limping when she was getting out the office . I will refer to occupational therapy if she is indicated for walker . Main problem she states that she can walk, but she cannot stand on her feet for long time, and she needs a walker with sitting, when she can sit, when she is tired     Obesity (BMI 30-39.9)  Continues to loose weight. I encourage to continue to eat healthy, be active     Memory problem  She scores 2/3 on  Recalling names, able to draw clock. She has more trouble concentrating due to bipolar.     Chronic pain of left ankle  She had a surgery 08/18/2017 for hardware removal. She is recovering well. Off the boot.     Chronic pain of both knees  Chronic. Take advil. She tells me that knee pain, ankle pain is detrimental for her. She cannot walk because of pain. No trauma, constant pain, sharp pain. Worse on walking.   Knee XR 08/2017:  1.  Minor degenerative changes of the right knee.  2.  No acute right knee fracture or dislocation.    Chronic bilateral thoracic back pain  Chronic back pain for a long time. She had refused surgery in the past. She tells me that she needs pain meds to function.she was not happy with previous pain management.     Cervical MRI 08/15/2017:   Mild degenerative disease in the cervical spine as described above.    Thoracic MRI 08/15/2017:   Mild degenerative change at T3-T4 without impingement on the neural axis    MRI lumbar 08/15/2017  1.  Multilevel multifactorial degenerative changes  2.  BILATERAL L5 pars defects with 8 mm of anterolisthesis of L5 on S1  3.  Neural foraminal narrowing worst bilaterally at L5-S1 as described above    Bipolar 1 disorder (CMS-[facility])  Follows with psychiatry. Off tegretol. Not maniacial. Not suicidal         Services suggested: No services needed at this time  Health Care  Screening recommendations as per orders if indicated.  Referrals offered: PT/OT/Nutrition counseling/Behavioral Health/Smoking cessation as per orders if indicated.    Discussion today about general wellness and lifestyle habits:    · Prevent falls and reduce trip hazards; Cautioned about securing or removing rugs.  · Have a working fire alarm and carbon monoxide detector;   · Engage in regular physical activity and social activities       Follow-up: Return in about 3 months (around 2/8/2018) for Short.

## 2017-11-09 NOTE — ASSESSMENT & PLAN NOTE
Chronic, no previous trauma. She reports that she is significant pain, but she is smiling, communicate fluently and does not look in significant pain . XR 08/2017, normal. Follow up with pain specialist . She is askign for walker. She can well, no limping when she was getting out the office . I will refer to occupational therapy if she is indicated for walker . Main problem she states that she can walk, but she cannot stand on her feet for long time, and she needs a walker with sitting, when she can sit, when she is tired

## 2017-11-09 NOTE — ASSESSMENT & PLAN NOTE
Chronic. Take advil. She tells me that knee pain, ankle pain is detrimental for her. She cannot walk because of pain. No trauma, constant pain, sharp pain. Worse on walking.   Knee XR 08/2017:  1.  Minor degenerative changes of the right knee.  2.  No acute right knee fracture or dislocation.

## 2017-11-09 NOTE — ASSESSMENT & PLAN NOTE
She scores 2/3 on  Recalling names, able to draw clock. She has more trouble concentrating due to bipolar.

## 2017-11-09 NOTE — ASSESSMENT & PLAN NOTE
Chronic back pain for a long time. She had refused surgery in the past. She tells me that she needs pain meds to function.she was not happy with previous pain management.     Cervical MRI 08/15/2017:   Mild degenerative disease in the cervical spine as described above.    Thoracic MRI 08/15/2017:   Mild degenerative change at T3-T4 without impingement on the neural axis    MRI lumbar 08/15/2017  1.  Multilevel multifactorial degenerative changes  2.  BILATERAL L5 pars defects with 8 mm of anterolisthesis of L5 on S1  3.  Neural foraminal narrowing worst bilaterally at L5-S1 as described above

## 2017-11-15 ENCOUNTER — TELEPHONE (OUTPATIENT)
Dept: MEDICAL GROUP | Facility: PHYSICIAN GROUP | Age: 47
End: 2017-11-15

## 2017-11-15 DIAGNOSIS — M25.561 CHRONIC PAIN OF BOTH KNEES: ICD-10-CM

## 2017-11-15 DIAGNOSIS — M25.551 PAIN OF RIGHT HIP JOINT: ICD-10-CM

## 2017-11-15 DIAGNOSIS — G89.29 CHRONIC PAIN OF LEFT ANKLE: ICD-10-CM

## 2017-11-15 DIAGNOSIS — G89.29 CHRONIC PAIN OF BOTH KNEES: ICD-10-CM

## 2017-11-15 DIAGNOSIS — M25.562 CHRONIC PAIN OF BOTH KNEES: ICD-10-CM

## 2017-11-15 DIAGNOSIS — M25.572 CHRONIC PAIN OF LEFT ANKLE: ICD-10-CM

## 2019-08-22 ENCOUNTER — APPOINTMENT (OUTPATIENT)
Dept: RADIOLOGY | Facility: MEDICAL CENTER | Age: 49
End: 2019-08-22
Attending: EMERGENCY MEDICINE
Payer: MEDICARE

## 2019-08-22 ENCOUNTER — HOSPITAL ENCOUNTER (EMERGENCY)
Facility: MEDICAL CENTER | Age: 49
End: 2019-08-22
Attending: EMERGENCY MEDICINE
Payer: MEDICARE

## 2019-08-22 VITALS
TEMPERATURE: 97.9 F | OXYGEN SATURATION: 96 % | DIASTOLIC BLOOD PRESSURE: 70 MMHG | HEIGHT: 68 IN | RESPIRATION RATE: 16 BRPM | BODY MASS INDEX: 43.27 KG/M2 | HEART RATE: 76 BPM | WEIGHT: 285.5 LBS | SYSTOLIC BLOOD PRESSURE: 114 MMHG

## 2019-08-22 DIAGNOSIS — R07.89 CHEST WALL PAIN: ICD-10-CM

## 2019-08-22 DIAGNOSIS — F41.8 SITUATIONAL ANXIETY: ICD-10-CM

## 2019-08-22 LAB
ALBUMIN SERPL BCP-MCNC: 4.1 G/DL (ref 3.2–4.9)
ALBUMIN/GLOB SERPL: 1.2 G/DL
ALP SERPL-CCNC: 74 U/L (ref 30–99)
ALT SERPL-CCNC: 16 U/L (ref 2–50)
ANION GAP SERPL CALC-SCNC: 8 MMOL/L (ref 0–11.9)
APTT PPP: 30.7 SEC (ref 24.7–36)
AST SERPL-CCNC: 20 U/L (ref 12–45)
BASOPHILS # BLD AUTO: 0.6 % (ref 0–1.8)
BASOPHILS # BLD: 0.05 K/UL (ref 0–0.12)
BILIRUB SERPL-MCNC: 0.4 MG/DL (ref 0.1–1.5)
BUN SERPL-MCNC: 6 MG/DL (ref 8–22)
CALCIUM SERPL-MCNC: 8.8 MG/DL (ref 8.5–10.5)
CHLORIDE SERPL-SCNC: 104 MMOL/L (ref 96–112)
CO2 SERPL-SCNC: 25 MMOL/L (ref 20–33)
CREAT SERPL-MCNC: 0.59 MG/DL (ref 0.5–1.4)
EKG IMPRESSION: NORMAL
EOSINOPHIL # BLD AUTO: 0.12 K/UL (ref 0–0.51)
EOSINOPHIL NFR BLD: 1.5 % (ref 0–6.9)
ERYTHROCYTE [DISTWIDTH] IN BLOOD BY AUTOMATED COUNT: 47.5 FL (ref 35.9–50)
GLOBULIN SER CALC-MCNC: 3.3 G/DL (ref 1.9–3.5)
GLUCOSE SERPL-MCNC: 91 MG/DL (ref 65–99)
HCT VFR BLD AUTO: 40.4 % (ref 37–47)
HGB BLD-MCNC: 12.6 G/DL (ref 12–16)
IMM GRANULOCYTES # BLD AUTO: 0.02 K/UL (ref 0–0.11)
IMM GRANULOCYTES NFR BLD AUTO: 0.3 % (ref 0–0.9)
INR PPP: 1.11 (ref 0.87–1.13)
LYMPHOCYTES # BLD AUTO: 2.1 K/UL (ref 1–4.8)
LYMPHOCYTES NFR BLD: 26.8 % (ref 22–41)
MCH RBC QN AUTO: 27.5 PG (ref 27–33)
MCHC RBC AUTO-ENTMCNC: 31.2 G/DL (ref 33.6–35)
MCV RBC AUTO: 88.2 FL (ref 81.4–97.8)
MONOCYTES # BLD AUTO: 0.64 K/UL (ref 0–0.85)
MONOCYTES NFR BLD AUTO: 8.2 % (ref 0–13.4)
NEUTROPHILS # BLD AUTO: 4.91 K/UL (ref 2–7.15)
NEUTROPHILS NFR BLD: 62.6 % (ref 44–72)
NRBC # BLD AUTO: 0 K/UL
NRBC BLD-RTO: 0 /100 WBC
PLATELET # BLD AUTO: 268 K/UL (ref 164–446)
PMV BLD AUTO: 10 FL (ref 9–12.9)
POTASSIUM SERPL-SCNC: 3.6 MMOL/L (ref 3.6–5.5)
PROT SERPL-MCNC: 7.4 G/DL (ref 6–8.2)
PROTHROMBIN TIME: 14.5 SEC (ref 12–14.6)
RBC # BLD AUTO: 4.58 M/UL (ref 4.2–5.4)
SODIUM SERPL-SCNC: 137 MMOL/L (ref 135–145)
TROPONIN T SERPL-MCNC: <6 NG/L (ref 6–19)
WBC # BLD AUTO: 7.8 K/UL (ref 4.8–10.8)

## 2019-08-22 PROCEDURE — 93005 ELECTROCARDIOGRAM TRACING: CPT

## 2019-08-22 PROCEDURE — 85610 PROTHROMBIN TIME: CPT

## 2019-08-22 PROCEDURE — 85025 COMPLETE CBC W/AUTO DIFF WBC: CPT

## 2019-08-22 PROCEDURE — 96374 THER/PROPH/DIAG INJ IV PUSH: CPT | Mod: XU

## 2019-08-22 PROCEDURE — 99284 EMERGENCY DEPT VISIT MOD MDM: CPT

## 2019-08-22 PROCEDURE — 84484 ASSAY OF TROPONIN QUANT: CPT

## 2019-08-22 PROCEDURE — 71275 CT ANGIOGRAPHY CHEST: CPT

## 2019-08-22 PROCEDURE — 36415 COLL VENOUS BLD VENIPUNCTURE: CPT

## 2019-08-22 PROCEDURE — 80053 COMPREHEN METABOLIC PANEL: CPT

## 2019-08-22 PROCEDURE — 96375 TX/PRO/DX INJ NEW DRUG ADDON: CPT | Mod: XU

## 2019-08-22 PROCEDURE — 700111 HCHG RX REV CODE 636 W/ 250 OVERRIDE (IP): Performed by: EMERGENCY MEDICINE

## 2019-08-22 PROCEDURE — 700117 HCHG RX CONTRAST REV CODE 255: Performed by: EMERGENCY MEDICINE

## 2019-08-22 PROCEDURE — 85730 THROMBOPLASTIN TIME PARTIAL: CPT

## 2019-08-22 PROCEDURE — 93005 ELECTROCARDIOGRAM TRACING: CPT | Performed by: EMERGENCY MEDICINE

## 2019-08-22 RX ORDER — HYDROMORPHONE HYDROCHLORIDE 1 MG/ML
.5-1 INJECTION, SOLUTION INTRAMUSCULAR; INTRAVENOUS; SUBCUTANEOUS
Status: DISCONTINUED | OUTPATIENT
Start: 2019-08-22 | End: 2019-08-22 | Stop reason: HOSPADM

## 2019-08-22 RX ORDER — ATORVASTATIN CALCIUM 10 MG/1
10 TABLET, FILM COATED ORAL DAILY
COMMUNITY
End: 2021-06-04 | Stop reason: SDUPTHER

## 2019-08-22 RX ORDER — CEFDINIR 300 MG/1
300 CAPSULE ORAL 2 TIMES DAILY
COMMUNITY
Start: 2019-08-07 | End: 2021-06-04

## 2019-08-22 RX ORDER — CYCLOBENZAPRINE HCL 5 MG
5-10 TABLET ORAL 3 TIMES DAILY PRN
Qty: 30 TAB | Refills: 0 | Status: SHIPPED | OUTPATIENT
Start: 2019-08-22 | End: 2021-06-04

## 2019-08-22 RX ORDER — FERROUS SULFATE 325(65) MG
325 TABLET ORAL
Status: SHIPPED | COMMUNITY
End: 2022-06-08

## 2019-08-22 RX ORDER — RISPERIDONE 2 MG/1
2 TABLET ORAL
Status: SHIPPED | COMMUNITY
End: 2022-06-08

## 2019-08-22 RX ORDER — LORAZEPAM 2 MG/ML
0.5 INJECTION INTRAMUSCULAR ONCE
Status: COMPLETED | OUTPATIENT
Start: 2019-08-22 | End: 2019-08-22

## 2019-08-22 RX ORDER — LIDOCAINE 50 MG/G
1 PATCH TOPICAL EVERY 24 HOURS
Qty: 30 PATCH | Refills: 0 | Status: SHIPPED | OUTPATIENT
Start: 2019-08-22 | End: 2021-06-04

## 2019-08-22 RX ORDER — ONDANSETRON 2 MG/ML
4 INJECTION INTRAMUSCULAR; INTRAVENOUS ONCE
Status: COMPLETED | OUTPATIENT
Start: 2019-08-22 | End: 2019-08-22

## 2019-08-22 RX ADMIN — HYDROMORPHONE HYDROCHLORIDE 1 MG: 1 INJECTION, SOLUTION INTRAMUSCULAR; INTRAVENOUS; SUBCUTANEOUS at 10:55

## 2019-08-22 RX ADMIN — HYDROMORPHONE HYDROCHLORIDE 0.5 MG: 1 INJECTION, SOLUTION INTRAMUSCULAR; INTRAVENOUS; SUBCUTANEOUS at 11:19

## 2019-08-22 RX ADMIN — LORAZEPAM 0.5 MG: 2 INJECTION INTRAMUSCULAR; INTRAVENOUS at 11:19

## 2019-08-22 RX ADMIN — IOHEXOL 100 ML: 350 INJECTION, SOLUTION INTRAVENOUS at 11:14

## 2019-08-22 RX ADMIN — ONDANSETRON 4 MG: 2 INJECTION INTRAMUSCULAR; INTRAVENOUS at 10:55

## 2019-08-22 NOTE — ED NOTES
Pt to CT dept per [facility].  Pt has been crying out in severe pain.  Pain is sharp into left chest/breast area and radiating to her back.  Has been medicated for pain.

## 2019-08-22 NOTE — ED TRIAGE NOTES
Chief Complaint   Patient presents with   • Chest Pain     since last night. left sided. Seen at [facility] for similar last week.     To triage by wheelchair for above. VSS. Explained triage process, to waiting room. Asked to inform RN if questions or concerns arise.

## 2019-08-22 NOTE — ED NOTES
Med rec completed per pt and home pharmacy ([facility])  Allergies reviewed    Pt completed a 9 day course of Cefdinir on 8/16/19

## 2019-08-22 NOTE — ED PROVIDER NOTES
"ED Provider Note    Scribed for Luis Almanza M.D. by Ellie Hanna. 8/22/2019  10:31 AM    Primary care provider: Rubi Ruby M.D.  Means of arrival: wheel chair  History obtained from: patient  History limited by: none    CHIEF COMPLAINT  Chief Complaint   Patient presents with   • Chest Pain     since last night. left sided. Seen at [facility] for similar last week.       HPI  Cortney Gavin is a 49 y.o. Female with a history of schizophrenia who presents to the Emergency Department with complaints of ongoing left sided chest pain onset last night. She states that pain is constant. Patient describes chest pain as \"tight\" and \"sharp\" in quality. She adds that the pain radiates down her spine, but denies radiation of pain to neck or bilateral arms. She notes that the pain is exacerbated with deep respirations. The patients endorses mild shortness or breath, but denies any associated abdominal pain, nausea, or vomiting. She states that she was seen at [facility] yesterday where a chest x-ray, troponin, and EKG was done. She was discharged home when labs and imaging indicated results were unremarkable. The patient notes last bowel movement was yesterday morning. She denies any trauma to the chest. The patients states at 15 years old she had a mitral valve prolapse, and at 20 years old she was diagnosed with a connective tissues disorder. She denies any history of pulmonary embolism or DVT. Denies history of diabetes mellitus or hypertension. Patient notes history of hyperlipidemia.      REVIEW OF SYSTEMS  Pertinent positives include left sided chest pain and shortness of breath. Pertinent negatives include no abdominal pain, nausea, or vomiting.  All other systems reviewed and negative.    PAST MEDICAL HISTORY   has a past medical history of Arthritis, Back pain, Bipolar 1 disorder (HCC), Chronic back pain, Schizophrenia (HCC), and Seizure (HCC).    SURGICAL HISTORY   has a past surgical history that " "includes ankle orif (Left, 2015); knee arthrotomy (Bilateral, 1980's); tubal ligation (1990's); hardware removal ortho (Left, 8/18/2017); and ankle orif (Left, 8/18/2017).    SOCIAL HISTORY  Social History     Tobacco Use   • Smoking status: Passive Smoke Exposure - Never Smoker   • Smokeless tobacco: Never Used   Substance Use Topics   • Alcohol use: No   • Drug use: Yes     Types: Inhaled     Comment: thc=  last used 1 month ago      Social History     Substance and Sexual Activity   Drug Use Yes   • Types: Inhaled    Comment: thc=  last used 1 month ago       FAMILY HISTORY  Family History   Problem Relation Age of Onset   • Heart Disease Mother         a fib   • Heart Disease Maternal Aunt         chf   • Cancer Maternal Grandfather         lung cancer, smoker   • Diabetes Neg Hx        CURRENT MEDICATIONS  Home Medications     Reviewed by Maite Wayne (Pharmacy Tech) on 08/22/19 at 1143  Med List Status: Complete   Medication Last Dose Status   atorvastatin (LIPITOR) 10 MG Tab 8/22/2019 Active   cefdinir (OMNICEF) 300 MG Cap 8/16/2019 Active   ferrous sulfate 325 (65 Fe) MG tablet 8/20/2019 Active   ibuprofen (MOTRIN) 200 MG Tab 8/22/2019 Active   risperiDONE (RISPERDAL) 2 MG Tab 8/21/2019 Active   sertraline (ZOLOFT) 100 MG Tab 8/22/2019 Active   trazodone (DESYREL) 150 MG Tab 8/21/2019 Active                ALLERGIES  Allergies   Allergen Reactions   • Morphine Swelling     Lip swelling with blisters on the lips    • Norco [Apap-Fd&C Yellow #10 [place]-Hydrocodone] Itching       PHYSICAL EXAM  VITAL SIGNS: /93   Pulse 90   Temp 36.6 °C (97.9 °F) (Temporal)   Resp 18   Ht 1.727 m (5' 8\")   Wt (!) 129.5 kg (285 lb 7.9 oz)   SpO2 96%   BMI 43.41 kg/m²     Constitutional: Well developed, Well nourished, moderate to severe distress, Non-toxic appearance.   HENT: Normocephalic, Atraumatic, Bilateral external ears normal, Oropharynx moist, No oral exudates.   Eyes: PERRLA, EOMI, Conjunctiva " normal, No discharge.   Neck: No tenderness, Supple, No stridor.   Lymphatic: No lymphadenopathy noted.   Cardiovascular: Normal heart rate, Normal rhythm.   Thorax & Lungs:Tenderness to anterior inferior left chest wall. Clear to auscultation bilaterally, No respiratory distress, No wheezing, No crackles.   Abdomen: Soft, No tenderness, No masses, No pulsatile masses.   Skin: Warm, Dry, No erythema, No rash.   Extremities:, No edema No cyanosis.   Musculoskeletal: No tenderness to palpation or major deformities noted.  Intact distal pulses  Neurologic: Awake, alert. Moves all extremities spontaneously.  Psychiatric: Crying, tearful, anxious.        LABS  Labs Reviewed   CBC WITH DIFFERENTIAL - Abnormal; Notable for the following components:       Result Value    MCHC 31.2 (*)     All other components within normal limits    Narrative:     Indicate which anticoagulants the patient is on:->UNKNOWN   COMP METABOLIC PANEL - Abnormal; Notable for the following components:    Bun 6 (*)     All other components within normal limits    Narrative:     Indicate which anticoagulants the patient is on:->UNKNOWN   TROPONIN    Narrative:     Indicate which anticoagulants the patient is on:->UNKNOWN   APTT    Narrative:     Indicate which anticoagulants the patient is on:->UNKNOWN   PROTHROMBIN TIME    Narrative:     Indicate which anticoagulants the patient is on:->UNKNOWN   ESTIMATED GFR    Narrative:     Indicate which anticoagulants the patient is on:->UNKNOWN     All labs reviewed by me.    EKG  12 lead EKG interpreted by me.  Results for orders placed or performed during the hospital encounter of 19   EKG (NOW)   Result Value Ref Range    Report       [facility] Emergency Dept.    Test Date:  2019  Pt Name:    KYLE KWON               Department: ER  MRN:        [id number]                      Room:  Gender:     Female                       Technician: [id number]  :        1970                    Requested By:ER TRIAGE PROTOCOL  Order #:    [id number]                    Reading MD: KAYLYNN VELA MD    Measurements  Intervals                                Axis  Rate:       71                           P:          34  GA:         164                          QRS:        88  QRSD:       102                          T:          49  QT:         408  QTc:        444    Interpretive Statements  SINUS RHYTHM  Compared to ECG 09/29/2017 17:23:49  Incomplete right bundle-branch block no longer present    Electronically Signed On 8- 10:32:44 PDT by KAYLYNN VELA MD           RADIOLOGY  CT-CTA COMPLETE THORACOABDOMINAL AORTA   Final Result      1.  No aortic aneurysm or dissection. No acute abnormality in the visualized chest or abdomen/pelvis.   2.  Borderline cardiomegaly.              The radiologist's interpretation of all radiological studies have been reviewed by me.      COURSE & MEDICAL DECISION MAKING  Pertinent Labs & Imaging studies reviewed. (See chart for details)    I reviewed the patient's medical records which showed history of schizophrenia.     10:31 AM - Patient seen and examined at bedside. Patient will be treated with ativan, zofran, and dilaudid. Ordered CT-CTA complete thoracoabdominal aorta, CBC with differential, CMP, troponin, APTT, PT/INR, and EKG to evaluate her symptoms. The differential diagnoses include but are not limited to: Thoracic aortic dissection, aneurysm, chest wall pain, anxiety, ACS.    12:29 - Patient was reevaluated at bedside. Patient is resting comfortably in bed with stable vital signs. Discussed lab and radiology results with the patient and informed them that labs were unremarkable. Labs indicated negative pneumonia and pain is likely secondary to chest wall. I prescribed muscle relaxors and lidocaine patches. I advised patient to take tylonel and ibuprofen every 6 hours as needed for pain. The patient will be discharge home at this time and  will return to the ED for any new or worsening symptoms.        Decision Making:  Patient is coming into the ER secondary to chest pain.  She states that she has a history of connective tissue disorder although she does not know if she has Marfan's, she has sharp shooting pain that radiates to her back with associated short of breath.  Patient is writhing in pain, give the patient multiple doses of pain medicine without any improvement of the patient's symptoms, give the patient some Ativan.  Got a CT aortogram secondary to the patient's questionable history of connective tissue disorder, this was negative.  I got medical records from [facility] looks like the patient is on chronic pain medicines, has not picked them up recently, had a recent cardiac catheterization within the last 1 to 2 years, normal stress test earlier this month.  At this point time I believe the patient's symptoms are likely secondary to chest wall pain.  We will discharge patient home, give the patient Flexeril, lidocaine patches, diclofenac gel, have the patient follow-up with her primary care physician or pain management specialist.     The patient will return for new or worsening symptoms and is stable at the time of discharge    DISPOSITION:  Patient will be discharged home in stable condition.    FOLLOW UP:  [facility], Emergency Dept  [address] [id number] [phone]    If symptoms worsen    Rubi Ruby M.D.  [address] [id number]  [phone]            OUTPATIENT MEDICATIONS:  Discharge Medication List as of 8/22/2019 12:43 PM      START taking these medications    Details   lidocaine (LIDODERM) 5 % Patch Apply 1 Patch to skin as directed every 24 hours., Disp-30 Patch, R-0, Normal      cyclobenzaprine (FLEXERIL) 5 MG tablet Take 1-2 Tabs by mouth 3 times a day as needed., Disp-30 Tab, R-0, Normal      Diclofenac Sodium 1 % Gel Apply 2 g to skin as directed 3 times a day  as needed., Disp-100 g, R-0, Normal               FINAL IMPRESSION  1. Chest wall pain    2. Situational anxiety          I, Ellie Hanna (Scribe), am scribing for, and in the presence of, Luis Almanza M.D..    Electronically signed by: Ellie Hanna (Scribe), 8/22/2019    I, Luis Almanza M.D. personally performed the services described in this documentation, as scribed by Ellie Hanna in my presence, and it is both accurate and complete. C    The note accurately reflects work and decisions made by me.  Luis Almanza  8/22/2019  6:03 PM

## 2021-06-03 ENCOUNTER — TELEPHONE (OUTPATIENT)
Dept: SCHEDULING | Facility: IMAGING CENTER | Age: 51
End: 2021-06-03

## 2021-06-04 ENCOUNTER — OFFICE VISIT (OUTPATIENT)
Dept: MEDICAL GROUP | Facility: CLINIC | Age: 51
End: 2021-06-04
Payer: MEDICARE

## 2021-06-04 DIAGNOSIS — M25.561 CHRONIC PAIN OF BOTH KNEES: ICD-10-CM

## 2021-06-04 DIAGNOSIS — G89.29 CHRONIC PAIN OF BOTH KNEES: ICD-10-CM

## 2021-06-04 DIAGNOSIS — E78.2 MIXED HYPERLIPIDEMIA: ICD-10-CM

## 2021-06-04 DIAGNOSIS — G89.29 CHRONIC PAIN OF LEFT ANKLE: ICD-10-CM

## 2021-06-04 DIAGNOSIS — Z00.00 BLOOD TESTS FOR ROUTINE GENERAL PHYSICAL EXAMINATION: ICD-10-CM

## 2021-06-04 DIAGNOSIS — R73.01 ELEVATED FASTING BLOOD SUGAR: ICD-10-CM

## 2021-06-04 DIAGNOSIS — E55.9 VITAMIN D DEFICIENCY: ICD-10-CM

## 2021-06-04 DIAGNOSIS — D50.8 IRON DEFICIENCY ANEMIA SECONDARY TO INADEQUATE DIETARY IRON INTAKE: ICD-10-CM

## 2021-06-04 DIAGNOSIS — M25.572 CHRONIC PAIN OF LEFT ANKLE: ICD-10-CM

## 2021-06-04 DIAGNOSIS — M25.562 CHRONIC PAIN OF BOTH KNEES: ICD-10-CM

## 2021-06-04 DIAGNOSIS — M25.551 PAIN OF RIGHT HIP JOINT: ICD-10-CM

## 2021-06-04 PROCEDURE — 99204 OFFICE O/P NEW MOD 45 MIN: CPT | Performed by: PHYSICIAN ASSISTANT

## 2021-06-04 RX ORDER — ATORVASTATIN CALCIUM 10 MG/1
10 TABLET, FILM COATED ORAL DAILY
Qty: 90 TABLET | Refills: 3 | Status: SHIPPED | OUTPATIENT
Start: 2021-06-04 | End: 2022-06-08

## 2021-06-04 RX ORDER — OXYCODONE HYDROCHLORIDE 15 MG/1
1 TABLET, FILM COATED, EXTENDED RELEASE ORAL 3 TIMES DAILY
COMMUNITY
End: 2022-06-08

## 2021-06-04 ASSESSMENT — FIBROSIS 4 INDEX: FIB4 SCORE: 0.91

## 2021-06-04 NOTE — PROGRESS NOTES
Chief Complaint   Patient presents with   • Establish Care     would like to have a order for a mammogram.    • Referral Needed     spine lilli sevilla, [place] pain and fracture for chronic pain.        HPI:  Cortney is a 50 y.o. female new patient presenting with the following:    Chronic pain of both knees  Patient has a history of bilateral knee surgeries with retained hardware in Left. Left ankle surgery with retained hardware. With chronic pain. Moved from [place] and needs to reestablish with pain management. Referral placed today.    Chronic pain of left ankle  See note above for knee pain.    Blood tests for routine general physical examination  Patient due for routine fasting blood work for physical exam. Will follow up with results.      Patient Active Problem List    Diagnosis Date Noted   • Mixed hyperlipidemia 06/04/2021   • Elevated fasting blood sugar 06/04/2021   • Iron deficiency anemia secondary to inadequate dietary iron intake 06/04/2021   • Blood tests for routine general physical examination 06/04/2021   • Vitamin D deficiency 06/04/2021   • Chronic pain of left ankle 08/04/2017   • Pain of right hip joint 07/26/2017   • Chronic pain of both knees 07/26/2017   • Obesity (BMI 30-39.9) 07/19/2017   • Chronic bilateral thoracic back pain 07/19/2017   • Bipolar 1 disorder (HCC) 07/19/2017   • Primary insomnia 07/19/2017   • Memory problem 07/19/2017       Current Outpatient Medications   Medication Sig Dispense Refill   • oxyCODONE HCl ER 15 MG Tablet Extended Release 12 hour Abuse-Deterrent Take 1 tablet by mouth 3 times a day.     • atorvastatin (LIPITOR) 10 MG Tab Take 1 tablet by mouth every day. 90 tablet 3   • ferrous sulfate 325 (65 Fe) MG tablet Take 325 mg by mouth every Tuesday.     • risperiDONE (RISPERDAL) 2 MG Tab Take 2 mg by mouth every bedtime.     • ibuprofen (MOTRIN) 200 MG Tab Take 400 mg by mouth every 6 hours as needed.     • buPROPion (WELLBUTRIN) 100 MG Tab Take 100 mg by  mouth every day.     • TRUEplus Lancets 33G Misc USE TO TEST BLOOD SUGAR DAILY     • pregabalin (LYRICA) 200 MG capsule Take 1 capsule by mouth 3 times a day.     • zolpidem (AMBIEN) 10 MG Tab Take 1 tablet by mouth at bedtime.       No current facility-administered medications for this visit.         Allergies as of 06/04/2021 - Reviewed 06/04/2021   Allergen Reaction Noted   • Morphine Swelling 08/22/2019   • Norco [apap-fd&c yellow #10 [place]-hydrocodone] Itching 06/07/2017        Social History     Socioeconomic History   • Marital status:      Spouse name: Not on file   • Number of children: 1   • Years of education: Not on file   • Highest education level: High school graduate   Occupational History     Employer: [employer]   Tobacco Use   • Smoking status: Passive Smoke Exposure - Never Smoker   • Smokeless tobacco: Never Used   Vaping Use   • Vaping Use: Never used   Substance and Sexual Activity   • Alcohol use: No   • Drug use: Yes     Types: Inhaled     Comment: thc=  last used 1 month ago   • Sexual activity: Not Currently     Partners: Male     Birth control/protection: None   Other Topics Concern   • Not on file   Social History Narrative   • Not on file     Social Determinants of Health     Financial Resource Strain:    • Difficulty of Paying Living Expenses:    Food Insecurity:    • Worried About Running Out of Food in the Last Year:    • Ran Out of Food in the Last Year:    Transportation Needs:    • Lack of Transportation (Medical):    • Lack of Transportation (Non-Medical):    Physical Activity:    • Days of Exercise per Week:    • Minutes of Exercise per Session:    Stress:    • Feeling of Stress :    Social Connections:    • Frequency of Communication with Friends and Family:    • Frequency of Social Gatherings with Friends and Family:    • Attends [employer] Services:    • Active Member of Clubs or Organizations:    • Attends Club or Organization Meetings:    • Marital Status:    Intimate  Partner Violence:    • Fear of Current or Ex-Partner:    • Emotionally Abused:    • Physically Abused:    • Sexually Abused:        Family History   Problem Relation Age of Onset   • Heart Disease Mother         a fib   • Lung Disease Mother         COPD   • Psychiatric Illness Mother         bipolar   • Other Mother         fatty liver   • Heart Disease Maternal Aunt         chf   • Cancer Maternal Grandfather         lung cancer, smoker   • No Known Problems Sister    • No Known Problems Brother    • Parkinson's Disease Maternal Grandmother    • Diabetes Paternal Grandmother        Past Surgical History:   Procedure Laterality Date   • HARDWARE REMOVAL ORTHO Left 8/18/2017    Procedure: HARDWARE REMOVAL ORTHO - ANKLE;  Surgeon: Aguilar Schroeder M.D.;  Location: SURGERY [facility];  Service:    • ANKLE ORIF Left 8/18/2017    Procedure: ANKLE ORIF - SYNDESMOSIS INJURY ;  Surgeon: Aguilar Schroeder M.D.;  Location: SURGERY [facility];  Service:    • ANKLE ORIF Left 2015   • KNEE ARTHROTOMY Bilateral 1980's   • TUBAL LIGATION  1990's       Review of Systems:   Constitutional: Negative for fever, chills, weight change, fatigue, loss of appetite.  HNT: Negative for nosebleeds, congestion, odynophagia, sore throat or changes in taste.    Eyes: Negative for vision changes.   Ears: Negative for recent hearing changes, pain or discharge.  Neck: Negative for pain, swelling, lumps or goiter.  Respiratory: Negative for cough, sputum production, shortness of breath and wheezing.    Cardiovascular: Negative for chest pain, palpitations, orthopnea and leg swelling.   Gastrointestinal: Negative for constipation, diarrhea, heartburn, dysphagia, nausea, vomiting or abdominal pain.   Genitourinary: Negative for dysuria, urgency and frequency.   Musculoskeletal: Positive for chronic pain in bilateral knees, left ankle, right hip. Negative for myalgias, and back pain.  Skin: Negative for skin, hair or nail changes, rash,  "itching.   Neurological: Negative for dizziness, tingling, tremors, sensory change, gait/coordination changes, focal weakness and headaches.   Endo/Heme/Allergies: Does not bruise/bleed easily.   Psychiatric/Behavioral: Positive for bipolar disorder, insomnia. Negative for depression, suicidal ideas and memory loss.  Patient does not have anxiety.    Patient's last menstrual period was 09/30/2017.     Physical Exam:  /86   Pulse 85   Temp 35.9 °C (96.6 °F) (Temporal)   Resp 16   Ht 1.753 m (5' 9\")   Wt (!) 145 kg (319 lb 9.6 oz)   LMP 09/30/2017   SpO2 92%   Breastfeeding No   BMI 47.20 kg/m²    General:  Well nourished, well developed female. With a Body mass index is 47.2 kg/m². No apparent distress.  Eyes: EOM intact, PERRL, conjunctiva non-injected, sclera non-icteric.  Ears: Aisha pinnae, external auditory canals, TM pearly gray with normal light reflex bilaterally.  Neck: Neck supple with no cervical lymphadenopathy, JVD, palpable thyroid nodules or carotid bruits.  Pulmonary: Clear to ausculation bilaterally. Normal effort. No rales, ronchi, or wheezing.  Cardiovascular: Regular rate and rhythm without murmur, rub or gallop.   Extremities: Decreased range of motion in bilateral lower extremities. Warm and well perfused with no edema.  Skin: Intact with no obvious rashes or lesions.  Neuro: Cranial nerves I-XII grossly intact.  Psych: Alert and oriented x 3.  Appropriately dressed. Mood and affect appropriate.    Assessment/Plan:    1. Pain of right hip joint  REFERRAL TO PAIN MANAGEMENT   2. Chronic pain of both knees  REFERRAL TO PAIN MANAGEMENT   3. Chronic pain of left ankle  REFERRAL TO PAIN MANAGEMENT   4. Mixed hyperlipidemia  atorvastatin (LIPITOR) 10 MG Tab    Lipid Profile   5. Elevated fasting blood sugar  Comp Metabolic Panel    HEMOGLOBIN A1C   6. Iron deficiency anemia secondary to inadequate dietary iron intake  CBC WITH DIFFERENTIAL   7. Blood tests for routine general physical " examination  Comp Metabolic Panel    ESTIMATED GFR   8. Vitamin D deficiency  VITAMIN D,25 HYDROXY       Reviewed risks and benefits of treatment plan. Patient verbally agrees to plan of care.     Return in about 1 week (around 6/11/2021) for f/u labs.      Please note that this dictation was created using voice recognition software. I have made every reasonable attempt to correct obvious errors, but I expect that there are errors of grammar and possibly content that I did not discover before finalizing the note.

## 2021-06-28 VITALS
TEMPERATURE: 96.6 F | OXYGEN SATURATION: 92 % | BODY MASS INDEX: 43.4 KG/M2 | RESPIRATION RATE: 16 BRPM | HEIGHT: 69 IN | HEART RATE: 85 BPM | DIASTOLIC BLOOD PRESSURE: 86 MMHG | WEIGHT: 293 LBS | SYSTOLIC BLOOD PRESSURE: 132 MMHG

## 2021-06-28 RX ORDER — PREGABALIN 200 MG/1
1 CAPSULE ORAL 3 TIMES DAILY
COMMUNITY
Start: 2021-06-25 | End: 2021-08-31 | Stop reason: SDUPTHER

## 2021-06-28 RX ORDER — GLUCOSAM/CHON-MSM1/C/MANG/BOSW 500-416.6
TABLET ORAL
COMMUNITY
Start: 2021-06-22 | End: 2022-06-08

## 2021-06-28 RX ORDER — BUPROPION HYDROCHLORIDE 100 MG/1
100 TABLET ORAL
COMMUNITY
Start: 2021-06-22 | End: 2022-06-08

## 2021-06-28 RX ORDER — ZOLPIDEM TARTRATE 10 MG/1
1 TABLET ORAL
COMMUNITY
Start: 2021-04-01 | End: 2022-06-08

## 2021-06-28 NOTE — ASSESSMENT & PLAN NOTE
Patient has a history of bilateral knee surgeries with retained hardware in Left. Left ankle surgery with retained hardware. With chronic pain. Moved from [place] and needs to reestablish with pain management. Referral placed today.

## 2021-06-28 NOTE — ASSESSMENT & PLAN NOTE
a history of bilateral knee surgeries with retained hardware in Left. Left ankle surgery with retained hardware. With chronic pain. Moved from [place] and needs to reestablish with pain management.

## 2021-07-01 ENCOUNTER — TELEPHONE (OUTPATIENT)
Dept: MEDICAL GROUP | Facility: CLINIC | Age: 51
End: 2021-07-01

## 2021-07-02 NOTE — TELEPHONE ENCOUNTER
Unable to contact patient as patient does have a skype number. I will be messaging patient in My Chart to schedule apt. Please Advise.

## 2021-07-09 ENCOUNTER — APPOINTMENT (OUTPATIENT)
Dept: BEHAVIORAL HEALTH | Facility: CLINIC | Age: 51
End: 2021-07-09
Payer: COMMERCIAL

## 2022-05-27 ENCOUNTER — HOSPITAL ENCOUNTER (OUTPATIENT)
Facility: MEDICAL CENTER | Age: 52
End: 2022-05-27
Attending: NURSE PRACTITIONER
Payer: MEDICARE

## 2022-05-27 ENCOUNTER — NON-PROVIDER VISIT (OUTPATIENT)
Dept: MEDICAL GROUP | Facility: CLINIC | Age: 52
End: 2022-05-27
Payer: MEDICARE

## 2022-05-27 PROCEDURE — 36415 COLL VENOUS BLD VENIPUNCTURE: CPT | Performed by: PHYSICIAN ASSISTANT

## 2022-05-27 PROCEDURE — 99000 SPECIMEN HANDLING OFFICE-LAB: CPT | Performed by: PHYSICIAN ASSISTANT

## 2022-05-28 LAB
FORWARD REASON: SPWHY: NORMAL
FORWARDED TO LAB: SPWHR: NORMAL
SPECIMEN SENT (2ND): SPWT2: NORMAL
SPECIMEN SENT (3RD): SPWT3: NORMAL
SPECIMEN SENT: SPWT1: NORMAL

## 2022-06-08 ENCOUNTER — OFFICE VISIT (OUTPATIENT)
Dept: MEDICAL GROUP | Facility: CLINIC | Age: 52
End: 2022-06-08
Payer: MEDICARE

## 2022-06-08 VITALS
WEIGHT: 292.4 LBS | OXYGEN SATURATION: 93 % | RESPIRATION RATE: 18 BRPM | SYSTOLIC BLOOD PRESSURE: 132 MMHG | DIASTOLIC BLOOD PRESSURE: 90 MMHG | HEART RATE: 99 BPM | BODY MASS INDEX: 43.31 KG/M2 | TEMPERATURE: 98.5 F | HEIGHT: 69 IN

## 2022-06-08 DIAGNOSIS — F41.1 GAD (GENERALIZED ANXIETY DISORDER): ICD-10-CM

## 2022-06-08 DIAGNOSIS — Z12.12 SCREENING FOR COLORECTAL CANCER: ICD-10-CM

## 2022-06-08 DIAGNOSIS — Z11.3 ROUTINE SCREENING FOR STI (SEXUALLY TRANSMITTED INFECTION): ICD-10-CM

## 2022-06-08 DIAGNOSIS — E66.01 MORBID OBESITY WITH BMI OF 40.0-44.9, ADULT (HCC): ICD-10-CM

## 2022-06-08 DIAGNOSIS — Z00.00 ROUTINE PHYSICAL EXAMINATION: ICD-10-CM

## 2022-06-08 DIAGNOSIS — E78.2 MIXED HYPERLIPIDEMIA: ICD-10-CM

## 2022-06-08 DIAGNOSIS — D50.8 IRON DEFICIENCY ANEMIA SECONDARY TO INADEQUATE DIETARY IRON INTAKE: ICD-10-CM

## 2022-06-08 DIAGNOSIS — R73.01 ELEVATED FASTING BLOOD SUGAR: ICD-10-CM

## 2022-06-08 DIAGNOSIS — Z12.31 ENCOUNTER FOR SCREENING MAMMOGRAM FOR BREAST CANCER: ICD-10-CM

## 2022-06-08 DIAGNOSIS — E55.9 VITAMIN D DEFICIENCY: ICD-10-CM

## 2022-06-08 DIAGNOSIS — F32.A DEPRESSIVE DISORDER: ICD-10-CM

## 2022-06-08 DIAGNOSIS — Z12.11 SCREENING FOR COLORECTAL CANCER: ICD-10-CM

## 2022-06-08 PROBLEM — G40.909 SEIZURE DISORDER (HCC): Status: ACTIVE | Noted: 2021-09-21

## 2022-06-08 PROBLEM — F41.9 ANXIETY: Status: ACTIVE | Noted: 2021-09-21

## 2022-06-08 PROBLEM — J30.2 SEASONAL ALLERGIES: Status: ACTIVE | Noted: 2021-09-21

## 2022-06-08 PROBLEM — M19.90 ARTHRITIS: Status: RESOLVED | Noted: 2021-09-21 | Resolved: 2022-06-08

## 2022-06-08 PROBLEM — M54.9 BACKACHE: Status: ACTIVE | Noted: 2021-09-21

## 2022-06-08 PROBLEM — D64.9 ANEMIA: Status: RESOLVED | Noted: 2017-07-26 | Resolved: 2022-06-08

## 2022-06-08 PROBLEM — K80.20 CHOLELITHIASIS WITHOUT OBSTRUCTION: Status: ACTIVE | Noted: 2022-06-08

## 2022-06-08 PROBLEM — U07.1 COVID-19: Status: RESOLVED | Noted: 2021-08-16 | Resolved: 2022-06-08

## 2022-06-08 PROBLEM — U07.1 COVID-19: Status: ACTIVE | Noted: 2021-08-16

## 2022-06-08 PROBLEM — M54.9 BACKACHE: Status: RESOLVED | Noted: 2021-09-21 | Resolved: 2022-06-08

## 2022-06-08 PROBLEM — K80.50 CALCULUS OF COMMON BILE DUCT WITHOUT OBSTRUCTION: Status: ACTIVE | Noted: 2022-06-08

## 2022-06-08 PROBLEM — F20.9 SCHIZOPHRENIA (HCC): Status: ACTIVE | Noted: 2021-09-21

## 2022-06-08 PROBLEM — I87.2 PERIPHERAL VENOUS INSUFFICIENCY: Status: ACTIVE | Noted: 2022-06-08

## 2022-06-08 PROBLEM — M19.90 ARTHRITIS: Status: ACTIVE | Noted: 2021-09-21

## 2022-06-08 PROCEDURE — 99214 OFFICE O/P EST MOD 30 MIN: CPT | Performed by: PHYSICIAN ASSISTANT

## 2022-06-08 RX ORDER — FLUOXETINE 10 MG/1
10 CAPSULE ORAL DAILY
COMMUNITY
End: 2022-06-16

## 2022-06-08 RX ORDER — PREGABALIN 100 MG/1
CAPSULE ORAL
COMMUNITY
End: 2022-08-02

## 2022-06-08 RX ORDER — SERTRALINE HYDROCHLORIDE 100 MG/1
TABLET, FILM COATED ORAL
COMMUNITY
End: 2022-06-08

## 2022-06-08 RX ORDER — ZOLPIDEM TARTRATE 5 MG/1
TABLET ORAL
COMMUNITY
End: 2022-06-08

## 2022-06-08 RX ORDER — RISPERIDONE 1 MG/1
TABLET ORAL
COMMUNITY
End: 2022-06-08

## 2022-06-08 RX ORDER — IBUPROFEN 800 MG/1
TABLET ORAL
COMMUNITY
End: 2022-06-08

## 2022-06-08 RX ORDER — OMEPRAZOLE 20 MG/1
CAPSULE, DELAYED RELEASE ORAL
COMMUNITY
End: 2022-06-16 | Stop reason: SDUPTHER

## 2022-06-08 RX ORDER — METHOCARBAMOL 750 MG/1
TABLET, FILM COATED ORAL
COMMUNITY
End: 2022-06-08

## 2022-06-08 RX ORDER — ARIPIPRAZOLE 10 MG/1
10 TABLET ORAL DAILY
COMMUNITY
End: 2022-11-22

## 2022-06-08 RX ORDER — TRAZODONE HYDROCHLORIDE 50 MG/1
25 TABLET ORAL NIGHTLY
COMMUNITY

## 2022-06-08 RX ORDER — PREGABALIN 200 MG/1
CAPSULE ORAL
COMMUNITY
End: 2022-06-08

## 2022-06-08 RX ORDER — BUPROPION HYDROCHLORIDE 300 MG/1
TABLET ORAL
COMMUNITY
End: 2022-06-08

## 2022-06-08 RX ORDER — ZIPRASIDONE HYDROCHLORIDE 80 MG/1
CAPSULE ORAL
COMMUNITY
End: 2022-06-08

## 2022-06-08 RX ORDER — OXYCODONE HYDROCHLORIDE 15 MG/1
TABLET ORAL
COMMUNITY
End: 2022-06-08

## 2022-06-09 ENCOUNTER — HOSPITAL ENCOUNTER (OUTPATIENT)
Dept: LAB | Facility: MEDICAL CENTER | Age: 52
End: 2022-06-09
Attending: PHYSICIAN ASSISTANT
Payer: MEDICARE

## 2022-06-09 DIAGNOSIS — D50.8 IRON DEFICIENCY ANEMIA SECONDARY TO INADEQUATE DIETARY IRON INTAKE: ICD-10-CM

## 2022-06-09 DIAGNOSIS — R73.01 ELEVATED FASTING BLOOD SUGAR: ICD-10-CM

## 2022-06-09 DIAGNOSIS — F41.1 GAD (GENERALIZED ANXIETY DISORDER): ICD-10-CM

## 2022-06-09 DIAGNOSIS — E78.2 MIXED HYPERLIPIDEMIA: ICD-10-CM

## 2022-06-09 DIAGNOSIS — E55.9 VITAMIN D DEFICIENCY: ICD-10-CM

## 2022-06-09 DIAGNOSIS — F32.A DEPRESSIVE DISORDER: ICD-10-CM

## 2022-06-09 DIAGNOSIS — E66.01 MORBID OBESITY WITH BMI OF 40.0-44.9, ADULT (HCC): ICD-10-CM

## 2022-06-09 DIAGNOSIS — Z11.3 ROUTINE SCREENING FOR STI (SEXUALLY TRANSMITTED INFECTION): ICD-10-CM

## 2022-06-09 LAB
25(OH)D3 SERPL-MCNC: 25 NG/ML (ref 30–100)
ALBUMIN SERPL BCP-MCNC: 4.2 G/DL (ref 3.2–4.9)
ALBUMIN/GLOB SERPL: 1.4 G/DL
ALP SERPL-CCNC: 202 U/L (ref 30–99)
ALT SERPL-CCNC: 129 U/L (ref 2–50)
ANION GAP SERPL CALC-SCNC: 8 MMOL/L (ref 7–16)
AST SERPL-CCNC: 85 U/L (ref 12–45)
BASOPHILS # BLD AUTO: 0.9 % (ref 0–1.8)
BASOPHILS # BLD: 0.06 K/UL (ref 0–0.12)
BILIRUB SERPL-MCNC: 0.6 MG/DL (ref 0.1–1.5)
BUN SERPL-MCNC: 9 MG/DL (ref 8–22)
CALCIUM SERPL-MCNC: 8.6 MG/DL (ref 8.5–10.5)
CHLORIDE SERPL-SCNC: 106 MMOL/L (ref 96–112)
CHOLEST SERPL-MCNC: 220 MG/DL (ref 100–199)
CO2 SERPL-SCNC: 28 MMOL/L (ref 20–33)
CREAT SERPL-MCNC: 0.55 MG/DL (ref 0.5–1.4)
EOSINOPHIL # BLD AUTO: 0.22 K/UL (ref 0–0.51)
EOSINOPHIL NFR BLD: 3.2 % (ref 0–6.9)
ERYTHROCYTE [DISTWIDTH] IN BLOOD BY AUTOMATED COUNT: 48.4 FL (ref 35.9–50)
EST. AVERAGE GLUCOSE BLD GHB EST-MCNC: 100 MG/DL
FASTING STATUS PATIENT QL REPORTED: NORMAL
FOLATE SERPL-MCNC: 3.7 NG/ML
GFR SERPLBLD CREATININE-BSD FMLA CKD-EPI: 111 ML/MIN/1.73 M 2
GLOBULIN SER CALC-MCNC: 3.1 G/DL (ref 1.9–3.5)
GLUCOSE SERPL-MCNC: 94 MG/DL (ref 65–99)
HAV IGM SERPL QL IA: NORMAL
HBA1C MFR BLD: 5.1 % (ref 4–5.6)
HBV CORE IGM SER QL: NORMAL
HBV SURFACE AG SER QL: NORMAL
HCT VFR BLD AUTO: 44.5 % (ref 37–47)
HCV AB SER QL: NORMAL
HDLC SERPL-MCNC: 68 MG/DL
HGB BLD-MCNC: 14.5 G/DL (ref 12–16)
HIV 1+2 AB+HIV1 P24 AG SERPL QL IA: NORMAL
IMM GRANULOCYTES # BLD AUTO: 0.03 K/UL (ref 0–0.11)
IMM GRANULOCYTES NFR BLD AUTO: 0.4 % (ref 0–0.9)
IRON SATN MFR SERPL: 38 % (ref 15–55)
IRON SERPL-MCNC: 109 UG/DL (ref 40–170)
LDLC SERPL CALC-MCNC: 135 MG/DL
LYMPHOCYTES # BLD AUTO: 1.84 K/UL (ref 1–4.8)
LYMPHOCYTES NFR BLD: 26.7 % (ref 22–41)
MCH RBC QN AUTO: 29.9 PG (ref 27–33)
MCHC RBC AUTO-ENTMCNC: 32.6 G/DL (ref 33.6–35)
MCV RBC AUTO: 91.8 FL (ref 81.4–97.8)
MONOCYTES # BLD AUTO: 0.42 K/UL (ref 0–0.85)
MONOCYTES NFR BLD AUTO: 6.1 % (ref 0–13.4)
NEUTROPHILS # BLD AUTO: 4.33 K/UL (ref 2–7.15)
NEUTROPHILS NFR BLD: 62.7 % (ref 44–72)
NRBC # BLD AUTO: 0 K/UL
NRBC BLD-RTO: 0 /100 WBC
PLATELET # BLD AUTO: 285 K/UL (ref 164–446)
PMV BLD AUTO: 10.2 FL (ref 9–12.9)
POTASSIUM SERPL-SCNC: 4 MMOL/L (ref 3.6–5.5)
PROT SERPL-MCNC: 7.3 G/DL (ref 6–8.2)
RBC # BLD AUTO: 4.85 M/UL (ref 4.2–5.4)
SODIUM SERPL-SCNC: 142 MMOL/L (ref 135–145)
T PALLIDUM AB SER QL IA: NORMAL
T4 FREE SERPL-MCNC: 0.84 NG/DL (ref 0.93–1.7)
TIBC SERPL-MCNC: 284 UG/DL (ref 250–450)
TRIGL SERPL-MCNC: 87 MG/DL (ref 0–149)
TSH SERPL DL<=0.005 MIU/L-ACNC: 2.24 UIU/ML (ref 0.38–5.33)
UIBC SERPL-MCNC: 175 UG/DL (ref 110–370)
VIT B12 SERPL-MCNC: 609 PG/ML (ref 211–911)
WBC # BLD AUTO: 6.9 K/UL (ref 4.8–10.8)

## 2022-06-09 PROCEDURE — 36415 COLL VENOUS BLD VENIPUNCTURE: CPT

## 2022-06-09 PROCEDURE — 82306 VITAMIN D 25 HYDROXY: CPT

## 2022-06-09 PROCEDURE — 83540 ASSAY OF IRON: CPT

## 2022-06-09 PROCEDURE — 83036 HEMOGLOBIN GLYCOSYLATED A1C: CPT | Mod: GA

## 2022-06-09 PROCEDURE — 80053 COMPREHEN METABOLIC PANEL: CPT

## 2022-06-09 PROCEDURE — 82607 VITAMIN B-12: CPT

## 2022-06-09 PROCEDURE — G0475 HIV COMBINATION ASSAY: HCPCS | Mod: GA

## 2022-06-09 PROCEDURE — 82746 ASSAY OF FOLIC ACID SERUM: CPT

## 2022-06-09 PROCEDURE — 80061 LIPID PANEL: CPT

## 2022-06-09 PROCEDURE — 84443 ASSAY THYROID STIM HORMONE: CPT

## 2022-06-09 PROCEDURE — 80074 ACUTE HEPATITIS PANEL: CPT | Mod: GA

## 2022-06-09 PROCEDURE — 86780 TREPONEMA PALLIDUM: CPT | Mod: GA

## 2022-06-09 PROCEDURE — 85025 COMPLETE CBC W/AUTO DIFF WBC: CPT

## 2022-06-09 PROCEDURE — 84439 ASSAY OF FREE THYROXINE: CPT

## 2022-06-09 PROCEDURE — 83550 IRON BINDING TEST: CPT

## 2022-06-16 ENCOUNTER — OFFICE VISIT (OUTPATIENT)
Dept: MEDICAL GROUP | Facility: CLINIC | Age: 52
End: 2022-06-16
Payer: MEDICARE

## 2022-06-16 VITALS
DIASTOLIC BLOOD PRESSURE: 88 MMHG | TEMPERATURE: 97.9 F | BODY MASS INDEX: 42.8 KG/M2 | HEART RATE: 72 BPM | WEIGHT: 289 LBS | RESPIRATION RATE: 18 BRPM | SYSTOLIC BLOOD PRESSURE: 130 MMHG | HEIGHT: 69 IN | OXYGEN SATURATION: 92 %

## 2022-06-16 DIAGNOSIS — E03.8 SUBCLINICAL HYPOTHYROIDISM: ICD-10-CM

## 2022-06-16 DIAGNOSIS — E78.00 PURE HYPERCHOLESTEROLEMIA: ICD-10-CM

## 2022-06-16 DIAGNOSIS — E55.9 VITAMIN D DEFICIENCY: ICD-10-CM

## 2022-06-16 DIAGNOSIS — K21.9 GASTROESOPHAGEAL REFLUX DISEASE WITHOUT ESOPHAGITIS: ICD-10-CM

## 2022-06-16 DIAGNOSIS — R74.8 ELEVATED LIVER ENZYMES: ICD-10-CM

## 2022-06-16 DIAGNOSIS — E53.8 LOW FOLATE: ICD-10-CM

## 2022-06-16 PROCEDURE — 99214 OFFICE O/P EST MOD 30 MIN: CPT | Performed by: PHYSICIAN ASSISTANT

## 2022-06-16 RX ORDER — MELOXICAM 7.5 MG/1
TABLET ORAL
COMMUNITY
Start: 2022-06-02 | End: 2022-08-02

## 2022-06-16 RX ORDER — ARIPIPRAZOLE 5 MG/1
TABLET ORAL
COMMUNITY
Start: 2022-05-19 | End: 2022-06-16

## 2022-06-16 RX ORDER — OMEPRAZOLE 20 MG/1
20 CAPSULE, DELAYED RELEASE ORAL DAILY
Qty: 90 CAPSULE | Refills: 3 | Status: SHIPPED | OUTPATIENT
Start: 2022-06-16 | End: 2023-07-04

## 2022-06-16 RX ORDER — LEVOTHYROXINE SODIUM 0.03 MG/1
25 TABLET ORAL
Qty: 90 TABLET | Refills: 1 | Status: SHIPPED | OUTPATIENT
Start: 2022-06-16 | End: 2022-12-05

## 2022-06-16 RX ORDER — FLUOXETINE HYDROCHLORIDE 40 MG/1
80 CAPSULE ORAL DAILY
COMMUNITY
Start: 2022-05-29 | End: 2023-01-06

## 2022-06-16 ASSESSMENT — FIBROSIS 4 INDEX: FIB4 SCORE: 1.31

## 2022-06-16 NOTE — PROGRESS NOTES
Chief Complaint   Patient presents with   • Lab Results     Following up on lab results       HISTORY OF PRESENT ILLNESS: Patient is a 51 y.o. female established patient who presents today to discuss the following issues:    Subclinical hypothyroidism  This is a new condition for this patient diagnosed by labs done 06/09/22.  We will start levothyroxine 25 mcg and recheck labs in 6-8 weeks.  We have discussed proper way to take this medication to help control this condition.  We will follow-up in 6-8 weeks. TSH 2.24, free T4 0.84 and symptomatic.    Elevated liver enzymes  New condition for this patient. Labs done 06/09/22 show AST 85,  and alkaline phosphatase 202. Patient states that she does not drink or use illicit drugs. States that she occasionally uses marijuana but last use was months ago. Ordered abdominal US to assess liver and additional labs. Will follow up after US is complete.    Vitamin D deficiency  Chronic condition for this patient.Not on supplementation. Current level is 25. Recommend starting Vitamin D3 1000 units daily. Recheck labs in one year.    Low folate  This is a new condition for this patient diagnosed by labs done 06/09/22.  Current level is 3.7. We will start folic acid 1 mg and recheck labs in 2 months.  We have discussed increasing foods rich in folate/folic acid to help control this condition.  We will follow-up in 2 months.    Pure hypercholesterolemia  Chronic condition. Most recent lipid panel shows total cholesterol 220, tri 87, HDL 68, . Have discussed low fat diet and increased physical activity to 30 minutes 5 or more days a week. Will recheck labs in 6 months.    Gastroesophageal reflux disease without esophagitis  Patient is currently being treated for gerd, taking meds with no new symptoms or side effects, is trying to modify diet with decreased acidic foods, caffeine, and alcohol. Taking omeprazole 20 mg. Requesting refill.  Controlled      Patient Active  Problem List    Diagnosis Date Noted   • Elevated liver enzymes 06/30/2022   • Subclinical hypothyroidism 06/30/2022   • Gastroesophageal reflux disease without esophagitis 06/30/2022   • Low folate 06/30/2022   • Calculus of common bile duct without obstruction 06/08/2022   • Cholelithiasis without obstruction 06/08/2022   • Peripheral venous insufficiency 06/08/2022   • Anxiety 09/21/2021   • Depressive disorder 09/21/2021   • Schizophrenia ([facility]) 09/21/2021   • Seasonal allergies 09/21/2021   • Seizure disorder ([facility]) 09/21/2021   • Pure hypercholesterolemia 06/04/2021   • Elevated fasting blood sugar 06/04/2021   • Iron deficiency anemia secondary to inadequate dietary iron intake 06/04/2021   • Routine physical examination 06/04/2021   • Vitamin D deficiency 06/04/2021   • Chronic pain of left ankle 08/04/2017   • Pain of right hip joint 07/26/2017   • Chronic pain of both knees 07/26/2017   • Morbid obesity with BMI of 40.0-44.9, adult ([facility]) 07/19/2017   • Chronic bilateral thoracic back pain 07/19/2017   • Bipolar 1 disorder ([facility]) 07/19/2017   • Primary insomnia 07/19/2017   • Memory problem 07/19/2017       Allergies:Morphine, Norco [apap-fd&c blue #1-hydrocodone], and Norco [apap-fd&c yellow #10 [place]-hydrocodone]    Current Outpatient Medications   Medication Sig Dispense Refill   • fluoxetine (PROZAC) 40 MG capsule Take 80 mg by mouth every day.     • meloxicam (MOBIC) 7.5 MG Tab      • omeprazole (PRILOSEC) 20 MG delayed-release capsule Take 1 Capsule by mouth every day. 90 Capsule 3   • levothyroxine (SYNTHROID) 25 MCG Tab Take 1 Tablet by mouth every morning on an empty stomach. 90 Tablet 1   • pregabalin (LYRICA) 100 MG Cap 1 cap(s)     • traZODone (DESYREL) 50 MG Tab Take 25 mg by mouth every evening.     • ARIPiprazole (ABILIFY) 10 MG Tab Take 10 mg by mouth every day.       No current facility-administered medications for this visit.       Social History     Tobacco Use   • Smoking status: Passive  Smoke Exposure - Never Smoker   • Smokeless tobacco: Never Used   Vaping Use   • Vaping Use: Never used   Substance Use Topics   • Alcohol use: No   • Drug use: Not Currently     Types: Inhaled     Comment: thc=  last used 1 month ago       Family Status   Relation Name Status   • Mo  Alive   • MAunt  (Not Specified)   • MGFa     • Fa     • Sis  Alive   • Bro  Alive   • MGMo     • PGMo     • PGFa       Family History   Problem Relation Age of Onset   • Heart Disease Mother         a fib   • Lung Disease Mother         COPD   • Psychiatric Illness Mother         bipolar   • Other Mother         fatty liver   • Heart Disease Maternal Aunt         chf   • Cancer Maternal Grandfather         lung cancer, smoker   • No Known Problems Sister    • No Known Problems Brother    • Parkinson's Disease Maternal Grandmother    • Diabetes Paternal Grandmother        Review of Systems:   Constitutional: Negative for fever, chills, weight loss and malaise/fatigue.   HENT: Negative for ear pain, nosebleeds, congestion, sore throat and neck pain.    Eyes: Negative for blurred vision.   Respiratory: Negative for cough, sputum production, shortness of breath and wheezing.    Cardiovascular: Negative for chest pain, palpitations, orthopnea and leg swelling.   Gastrointestinal: Negative for heartburn, nausea, vomiting and abdominal pain.   Genitourinary: Negative for dysuria, urgency and frequency.   Musculoskeletal: Negative for myalgias, back pain and joint pain.   Skin: Negative for rash and itching.   Neurological: Negative for dizziness, tingling, tremors, sensory change, focal weakness and headaches.   Endo/Heme/Allergies: Does not bruise/bleed easily.   Psychiatric/Behavioral: Negative for depression, suicidal ideas and memory loss.  The patient is not nervous/anxious and does not have insomnia.    All other systems reviewed and are negative except as in HPI.    Wt Readings from Last 3  "Encounters:   06/16/22 (!) 131 kg (289 lb)   06/14/22 (!) 131 kg (289 lb 12.8 oz)   06/08/22 (!) 133 kg (292 lb 6.4 oz)   ]  Patient's last menstrual period was 09/30/2017.    Exam:  /88 (BP Location: Left arm, Patient Position: Sitting, BP Cuff Size: Large adult)   Pulse 72   Temp 36.6 °C (97.9 °F) (Temporal)   Resp 18   Ht 1.753 m (5' 9\")   Wt (!) 131 kg (289 lb)   SpO2 92%  Body mass index is 42.68 kg/m².   General:  Well nourished, morbidly obese, well developed female. No apparent distress. Not ill appearing.  Eyes: EOM intact, PERRL, conjunctiva non-injected, sclera non-icteric.  Neck: Supple with no cervical lymphadenopathy, JVD, palpable thyroid nodules or carotid bruits.  Pulmonary: Clear to ausculation bilaterally. Normal effort. No rales, ronchi, or wheezing.  Cardiovascular: Regular rate and rhythm without murmur, rub or gallop.   Extremities: Full range of motion. Warm and well perfused with no edema.  Skin: Intact with no obvious rashes or lesions.  Neuro: Cranial nerves I-XII grossly intact.  Psych: Alert and oriented x 3.  Appropriately dressed. Mood and affect appropriate.    Assessment/Plan:  1. Subclinical hypothyroidism  levothyroxine (SYNTHROID) 25 MCG Tab    FREE THYROXINE    TSH    TRIIDOTHYRONINE   2. Elevated liver enzymes  GAMMA GT (GGT)    US-ABDOMEN COMPLETE SURVEY   3. Vitamin D deficiency     4. Low folate     5. Pure hypercholesterolemia     6. Gastroesophageal reflux disease without esophagitis  omeprazole (PRILOSEC) 20 MG delayed-release capsule       Reviewed risks and benefits of treatment plan. Patient verbally agrees to plan of care.     Return in about 2 months (around 8/16/2022) for f/u labs.    Please note that this dictation was created using voice recognition software. I have made every reasonable attempt to correct obvious errors, but I expect that there are errors of grammar and possibly content that I did not discover before finalizing the note.  "

## 2022-06-30 PROBLEM — E78.00 PURE HYPERCHOLESTEROLEMIA: Status: ACTIVE | Noted: 2021-06-04

## 2022-06-30 PROBLEM — E53.8 LOW FOLATE: Status: ACTIVE | Noted: 2022-06-30

## 2022-06-30 PROBLEM — E03.8 SUBCLINICAL HYPOTHYROIDISM: Status: ACTIVE | Noted: 2022-06-30

## 2022-06-30 PROBLEM — K21.9 GASTROESOPHAGEAL REFLUX DISEASE WITHOUT ESOPHAGITIS: Status: ACTIVE | Noted: 2022-06-30

## 2022-06-30 PROBLEM — R74.8 ELEVATED LIVER ENZYMES: Status: ACTIVE | Noted: 2022-06-30

## 2022-06-30 NOTE — PROGRESS NOTES
Chief Complaint   Patient presents with   • Requesting Labs     Would like to be tested for multiple things       HISTORY OF PRESENT ILLNESS: Patient is a 51 y.o. female established patient who presents today to discuss the following issues:    Routine physical examination  Patient in clinic today to request lab work.  She has not had labs done in over a year. She wants routine labs, thyroid and STD testing completed. She wants HIV specifically because she is certain that she overheard someone say that she had aids when she was in the hospital. We will order and she will have drawn. We will follow up in two weeks to discuss results.      Patient Active Problem List    Diagnosis Date Noted   • Calculus of common bile duct without obstruction 06/08/2022   • Cholelithiasis without obstruction 06/08/2022   • Peripheral venous insufficiency 06/08/2022   • Anxiety 09/21/2021   • Depressive disorder 09/21/2021   • Schizophrenia ([facility]) 09/21/2021   • Seasonal allergies 09/21/2021   • Seizure disorder ([facility]) 09/21/2021   • Mixed hyperlipidemia 06/04/2021   • Elevated fasting blood sugar 06/04/2021   • Iron deficiency anemia secondary to inadequate dietary iron intake 06/04/2021   • Routine physical examination 06/04/2021   • Vitamin D deficiency 06/04/2021   • Chronic pain of left ankle 08/04/2017   • Pain of right hip joint 07/26/2017   • Chronic pain of both knees 07/26/2017   • Morbid obesity with BMI of 40.0-44.9, adult ([facility]) 07/19/2017   • Chronic bilateral thoracic back pain 07/19/2017   • Bipolar 1 disorder ([facility]) 07/19/2017   • Primary insomnia 07/19/2017   • Memory problem 07/19/2017       Allergies:Morphine, Norco [apap-fd&c blue #1-hydrocodone], and Norco [apap-fd&c yellow #10 [place]-hydrocodone]    Current Outpatient Medications   Medication Sig Dispense Refill   • pregabalin (LYRICA) 100 MG Cap 1 cap(s)     • traZODone (DESYREL) 50 MG Tab Take 25 mg by mouth every evening.     • ARIPiprazole (ABILIFY) 10 MG Tab  Take 10 mg by mouth every day.     • fluoxetine (PROZAC) 40 MG capsule Take 80 mg by mouth every day.     • meloxicam (MOBIC) 7.5 MG Tab      • omeprazole (PRILOSEC) 20 MG delayed-release capsule Take 1 Capsule by mouth every day. 90 Capsule 3   • levothyroxine (SYNTHROID) 25 MCG Tab Take 1 Tablet by mouth every morning on an empty stomach. 90 Tablet 1     No current facility-administered medications for this visit.       Social History     Tobacco Use   • Smoking status: Passive Smoke Exposure - Never Smoker   • Smokeless tobacco: Never Used   Vaping Use   • Vaping Use: Never used   Substance Use Topics   • Alcohol use: No   • Drug use: Not Currently     Types: Inhaled     Comment: thc=  last used 1 month ago       Family Status   Relation Name Status   • Mo  Alive   • MAunt  (Not Specified)   • MGFa     • Fa     • Sis  Alive   • Bro  Alive   • MGMo     • PGMo     • PGFa       Family History   Problem Relation Age of Onset   • Heart Disease Mother         a fib   • Lung Disease Mother         COPD   • Psychiatric Illness Mother         bipolar   • Other Mother         fatty liver   • Heart Disease Maternal Aunt         chf   • Cancer Maternal Grandfather         lung cancer, smoker   • No Known Problems Sister    • No Known Problems Brother    • Parkinson's Disease Maternal Grandmother    • Diabetes Paternal Grandmother        Review of Systems:   Constitutional: Negative for fever, chills, weight loss and malaise/fatigue.   HENT: Negative for ear pain, nosebleeds, congestion, sore throat and neck pain.    Eyes: Negative for blurred vision.   Respiratory: Negative for cough, sputum production, shortness of breath and wheezing.    Cardiovascular: Negative for chest pain, palpitations, orthopnea and leg swelling.   Gastrointestinal: Negative for heartburn, nausea, vomiting and abdominal pain.   Genitourinary: Negative for dysuria, urgency and frequency.   Musculoskeletal:  "Negative for myalgias, back pain and joint pain.   Skin: Negative for rash and itching.   Neurological: Negative for dizziness, tingling, tremors, sensory change, focal weakness and headaches.   Endo/Heme/Allergies: Does not bruise/bleed easily.   Psychiatric/Behavioral: Negative for depression, suicidal ideas and memory loss.  The patient is not nervous/anxious and does not have insomnia.    All other systems reviewed and are negative except as in HPI.    Wt Readings from Last 3 Encounters:   06/16/22 (!) 131 kg (289 lb)   06/14/22 (!) 131 kg (289 lb 12.8 oz)   06/08/22 (!) 133 kg (292 lb 6.4 oz)   ]  Patient's last menstrual period was 09/30/2017.    Exam:  BP (!) 132/90 (BP Location: Left arm, Patient Position: Sitting, BP Cuff Size: Large adult)   Pulse 99   Temp 36.9 °C (98.5 °F) (Temporal)   Resp 18   Ht 1.753 m (5' 9\")   Wt (!) 133 kg (292 lb 6.4 oz)   SpO2 93%  Body mass index is 43.18 kg/m².   General:  Well nourished, morbidly obese, well developed female. No apparent distress. Not ill appearing.  Eyes: EOM intact, PERRL, conjunctiva non-injected, sclera non-icteric.  Neck: Supple with no cervical lymphadenopathy, JVD, palpable thyroid nodules or carotid bruits.  Pulmonary: Clear to ausculation bilaterally. Normal effort. No rales, ronchi, or wheezing.  Cardiovascular: Regular rate and rhythm without murmur, rub or gallop.   Extremities: Full range of motion. Warm and well perfused with no edema.  Skin: Intact with no obvious rashes or lesions.  Neuro: Cranial nerves I-XII grossly intact.  Psych: Alert and oriented x 3.  Appropriately dressed. Mood and affect appropriate.    Assessment/Plan:  1. Routine physical examination     2. Routine screening for STI (sexually transmitted infection)  HEPATITIS PANEL ACUTE(4 COMPONENTS)    HIV AG/AB COMBO ASSAY SCREENING    T.PALLIDUM AB EIA   3. Vitamin D deficiency  VITAMIN D,25 HYDROXY   4. Mixed hyperlipidemia  Lipid Profile   5. Iron deficiency anemia " secondary to inadequate dietary iron intake  CBC WITH DIFFERENTIAL    IRON/TOTAL IRON BIND    VITAMIN B12    FOLATE   6. Elevated fasting blood sugar  HEMOGLOBIN A1C   7. Morbid obesity with BMI of 40.0-44.9, adult (HCC)  Comp Metabolic Panel    FREE THYROXINE    TSH    Patient identified as having weight management issue.  Appropriate orders and counseling given.   8. KARLA (generalized anxiety disorder)  FREE THYROXINE    TSH   9. Depressive disorder  FREE THYROXINE    TSH   10. Encounter for screening mammogram for breast cancer  MA-SCREENING MAMMO BILAT W/TOMOSYNTHESIS W/CAD   11. Screening for colorectal cancer  OCCULT BLOOD FECES IMMUNOASSAY (FIT)       Reviewed risks and benefits of treatment plan. Patient verbally agrees to plan of care.     Return in about 9 days (around 6/17/2022) for f/u labs.    Please note that this dictation was created using voice recognition software. I have made every reasonable attempt to correct obvious errors, but I expect that there are errors of grammar and possibly content that I did not discover before finalizing the note.

## 2022-06-30 NOTE — ASSESSMENT & PLAN NOTE
Patient in clinic today to request lab work.  She has not had labs done in over a year. She wants routine labs, thyroid and STD testing completed. She wants HIV specifically because she is certain that she overheard someone say that she had aids when she was in the hospital. We will order and she will have drawn. We will follow up in two weeks to discuss results.

## 2022-06-30 NOTE — ASSESSMENT & PLAN NOTE
This is a new condition for this patient diagnosed by labs done 06/09/22.  We will start levothyroxine 25 mcg and recheck labs in 6-8 weeks.  We have discussed proper way to take this medication to help control this condition.  We will follow-up in 6-8 weeks. TSH 2.24, free T4 0.84 and symptomatic.

## 2022-07-01 NOTE — ASSESSMENT & PLAN NOTE
New condition for this patient. Labs done 06/09/22 show AST 85,  and alkaline phosphatase 202. Patient states that she does not drink or use illicit drugs. States that she occasionally uses marijuana but last use was months ago. Ordered abdominal US to assess liver and additional labs. Will follow up after US is complete.

## 2022-07-01 NOTE — ASSESSMENT & PLAN NOTE
Chronic condition. Most recent lipid panel shows total cholesterol 220, tri 87, HDL 68, . Have discussed low fat diet and increased physical activity to 30 minutes 5 or more days a week. Will recheck labs in 6 months.

## 2022-07-01 NOTE — ASSESSMENT & PLAN NOTE
This is a new condition for this patient diagnosed by labs done 06/09/22.  Current level is 3.7. We will start folic acid 1 mg and recheck labs in 2 months.  We have discussed increasing foods rich in folate/folic acid to help control this condition.  We will follow-up in 2 months.

## 2022-07-01 NOTE — ASSESSMENT & PLAN NOTE
Patient is currently being treated for gerd, taking meds with no new symptoms or side effects, is trying to modify diet with decreased acidic foods, caffeine, and alcohol. Taking omeprazole 20 mg. Requesting refill.  Controlled

## 2022-07-01 NOTE — ASSESSMENT & PLAN NOTE
Chronic condition for this patient.Not on supplementation. Current level is 25. Recommend starting Vitamin D3 1000 units daily. Recheck labs in one year.

## 2022-07-22 ENCOUNTER — TELEPHONE (OUTPATIENT)
Dept: MEDICAL GROUP | Facility: CLINIC | Age: 52
End: 2022-07-22
Payer: MEDICARE

## 2022-07-22 NOTE — TELEPHONE ENCOUNTER
VOICEMAIL  1. Caller Name: Cortney Gavin                      Call Back Number: [phone]    2. Message: Pt called and stated that PCP ordered a liver test for her, but that the place she was sent to does not accept her insurance.     3. Patient approves office to leave a detailed voicemail/MyChart message: yes    I do not see a liver test on her chart or in her referrals, I could have missed it.

## 2022-08-02 ENCOUNTER — HOSPITAL ENCOUNTER (OUTPATIENT)
Facility: MEDICAL CENTER | Age: 52
End: 2022-08-02
Attending: PHYSICIAN ASSISTANT
Payer: MEDICARE

## 2022-08-02 ENCOUNTER — OFFICE VISIT (OUTPATIENT)
Dept: MEDICAL GROUP | Facility: CLINIC | Age: 52
End: 2022-08-02
Payer: MEDICARE

## 2022-08-02 VITALS
BODY MASS INDEX: 42.3 KG/M2 | SYSTOLIC BLOOD PRESSURE: 128 MMHG | TEMPERATURE: 98.1 F | OXYGEN SATURATION: 95 % | HEIGHT: 69 IN | RESPIRATION RATE: 16 BRPM | HEART RATE: 72 BPM | DIASTOLIC BLOOD PRESSURE: 60 MMHG | WEIGHT: 285.6 LBS

## 2022-08-02 DIAGNOSIS — N30.01 ACUTE CYSTITIS WITH HEMATURIA: ICD-10-CM

## 2022-08-02 DIAGNOSIS — R30.9 PAIN PASSING URINE: ICD-10-CM

## 2022-08-02 PROBLEM — G89.29 OTHER CHRONIC PAIN: Status: ACTIVE | Noted: 2022-07-15

## 2022-08-02 PROBLEM — M47.817 SPONDYLOSIS WITHOUT MYELOPATHY OR RADICULOPATHY, LUMBOSACRAL REGION: Status: ACTIVE | Noted: 2022-07-15

## 2022-08-02 PROBLEM — G62.9 POLYNEUROPATHY, UNSPECIFIED: Status: ACTIVE | Noted: 2022-07-15

## 2022-08-02 LAB
APPEARANCE UR: NORMAL
BILIRUB UR STRIP-MCNC: NEGATIVE MG/DL
COLOR UR AUTO: YELLOW
GLUCOSE UR STRIP.AUTO-MCNC: NEGATIVE MG/DL
KETONES UR STRIP.AUTO-MCNC: NEGATIVE MG/DL
LEUKOCYTE ESTERASE UR QL STRIP.AUTO: NORMAL
NITRITE UR QL STRIP.AUTO: NEGATIVE
PH UR STRIP.AUTO: 6 [PH] (ref 5–8)
PROT UR QL STRIP: 30 MG/DL
RBC UR QL AUTO: NORMAL
SP GR UR STRIP.AUTO: 1.02
UROBILINOGEN UR STRIP-MCNC: 0.2 MG/DL

## 2022-08-02 PROCEDURE — 99214 OFFICE O/P EST MOD 30 MIN: CPT | Performed by: PHYSICIAN ASSISTANT

## 2022-08-02 PROCEDURE — 81002 URINALYSIS NONAUTO W/O SCOPE: CPT | Performed by: PHYSICIAN ASSISTANT

## 2022-08-02 RX ORDER — MELOXICAM 10 MG/1
1 CAPSULE ORAL DAILY
COMMUNITY
Start: 2022-06-22 | End: 2022-11-22

## 2022-08-02 RX ORDER — PREGABALIN 200 MG/1
1 CAPSULE ORAL 3 TIMES DAILY
COMMUNITY
Start: 2022-07-11 | End: 2022-11-22

## 2022-08-02 RX ORDER — OXYCODONE HYDROCHLORIDE 5 MG/1
1 TABLET ORAL 2 TIMES DAILY
COMMUNITY
Start: 2022-07-22 | End: 2022-11-22

## 2022-08-02 RX ORDER — METHOCARBAMOL 750 MG/1
TABLET, FILM COATED ORAL
COMMUNITY
Start: 2022-07-22 | End: 2022-11-22

## 2022-08-02 RX ORDER — NITROFURANTOIN 25; 75 MG/1; MG/1
100 CAPSULE ORAL 2 TIMES DAILY
Qty: 14 CAPSULE | Refills: 0 | Status: SHIPPED | OUTPATIENT
Start: 2022-08-02 | End: 2022-11-22

## 2022-08-02 ASSESSMENT — FIBROSIS 4 INDEX: FIB4 SCORE: 1.34

## 2022-08-02 NOTE — PROGRESS NOTES
Chief Complaint   Patient presents with   • Abdominal Cramping     Painful, painful cramping, passed something the size of a dime, cloudy and bloody.        HISTORY OF PRESENT ILLNESS: Patient is a 52 y.o. female established patient who presents today to discuss the following issues:    Pain passing urine  Patient is having pain and blood with urination. Has been happening for two days. Has taken nothing for it. Passed something that looked like a clot yesterday and is having suprapubic cramping.    Acute cystitis with hematuria  Urine dip indicates acute cystitis. Urine has been sent to the lab for culture and sensitivity. Will start her on nitrofurantoin twice a day for 7 days. Will follow up with the culture results and adjust medication if necessary.      Patient Active Problem List    Diagnosis Date Noted   • Pain passing urine 08/03/2022   • Acute cystitis with hematuria 08/03/2022   • Other chronic pain 07/15/2022   • Polyneuropathy, unspecified 07/15/2022   • Spondylosis without myelopathy or radiculopathy, lumbosacral region 07/15/2022   • Elevated liver enzymes 06/30/2022   • Subclinical hypothyroidism 06/30/2022   • Gastroesophageal reflux disease without esophagitis 06/30/2022   • Low folate 06/30/2022   • Calculus of common bile duct without obstruction 06/08/2022   • Cholelithiasis without obstruction 06/08/2022   • Peripheral venous insufficiency 06/08/2022   • Anxiety 09/21/2021   • Depressive disorder 09/21/2021   • Schizophrenia ([facility]) 09/21/2021   • Seasonal allergies 09/21/2021   • Seizure disorder ([facility]) 09/21/2021   • Pure hypercholesterolemia 06/04/2021   • Elevated fasting blood sugar 06/04/2021   • Iron deficiency anemia secondary to inadequate dietary iron intake 06/04/2021   • Routine physical examination 06/04/2021   • Vitamin D deficiency 06/04/2021   • Chronic pain of left ankle 08/04/2017   • Pain of right hip joint 07/26/2017   • Chronic pain of both knees 07/26/2017   • Morbid obesity  with BMI of 40.0-44.9, adult ([facility]) 2017   • Chronic bilateral thoracic back pain 2017   • Bipolar 1 disorder ([facility]) 2017   • Primary insomnia 2017   • Memory problem 2017       Allergies:Morphine, Norco [apap-fd&c blue #1-hydrocodone], and Norco [apap-fd&c yellow #10 [place]-hydrocodone]    Current Outpatient Medications   Medication Sig Dispense Refill   • methocarbamol (ROBAXIN) 750 MG Tab      • nitrofurantoin (MACROBID) 100 MG Cap Take 1 Capsule by mouth 2 times a day. 14 Capsule 0   • fluoxetine (PROZAC) 40 MG capsule Take 80 mg by mouth every day.     • omeprazole (PRILOSEC) 20 MG delayed-release capsule Take 1 Capsule by mouth every day. 90 Capsule 3   • levothyroxine (SYNTHROID) 25 MCG Tab Take 1 Tablet by mouth every morning on an empty stomach. 90 Tablet 1   • ARIPiprazole (ABILIFY) 10 MG Tab Take 10 mg by mouth every day.     • oxyCODONE immediate-release (ROXICODONE) 5 MG Tab Take 1 Tablet by mouth 2 times a day.     • Meloxicam 10 MG Cap Take 1 Tablet by mouth every day.     • pregabalin (LYRICA) 200 MG capsule Take 1 Capsule by mouth in the morning, at noon, and at bedtime.     • traZODone (DESYREL) 50 MG Tab Take 25 mg by mouth every evening. (Patient not taking: Reported on 2022)       No current facility-administered medications for this visit.       Social History     Tobacco Use   • Smoking status: Passive Smoke Exposure - Never Smoker   • Smokeless tobacco: Never Used   Vaping Use   • Vaping Use: Never used   Substance Use Topics   • Alcohol use: No   • Drug use: Not Currently     Types: Inhaled     Comment: thc=  last used 1 month ago       Family Status   Relation Name Status   • Mo  Alive   • MAunt  (Not Specified)   • MGFa     • Fa     • Sis  Alive   • Bro  Alive   • MGMo     • PGMo     • PGFa       Family History   Problem Relation Age of Onset   • Heart Disease Mother         a fib   • Lung Disease Mother         COPD  "  • Psychiatric Illness Mother         bipolar   • Other Mother         fatty liver   • Heart Disease Maternal Aunt         chf   • Cancer Maternal Grandfather         lung cancer, smoker   • No Known Problems Sister    • No Known Problems Brother    • Parkinson's Disease Maternal Grandmother    • Diabetes Paternal Grandmother        Review of Systems:   Constitutional: Negative for fever, chills, weight loss and malaise/fatigue.   HENT: Negative for ear pain, nosebleeds, congestion, sore throat and neck pain.    Eyes: Negative for blurred vision.   Respiratory: Negative for cough, sputum production, shortness of breath and wheezing.    Cardiovascular: Negative for chest pain, palpitations, orthopnea and leg swelling.   Gastrointestinal: Negative for heartburn, nausea, vomiting and abdominal pain.   Genitourinary: Positive for dysuria and blood with urination. Negative for urgency and frequency.   Musculoskeletal: Negative for myalgias, back pain and joint pain.   Skin: Negative for rash and itching.   Neurological: Negative for dizziness, tingling, tremors, sensory change, focal weakness and headaches.   Endo/Heme/Allergies: Does not bruise/bleed easily.   Psychiatric/Behavioral: Negative for depression, suicidal ideas and memory loss.  The patient is not nervous/anxious and does not have insomnia.    All other systems reviewed and are negative except as in HPI.    Wt Readings from Last 3 Encounters:   08/02/22 (!) 130 kg (285 lb 9.6 oz)   06/16/22 (!) 131 kg (289 lb)   06/14/22 (!) 131 kg (289 lb 12.8 oz)   ]  Patient's last menstrual period was 09/30/2017.    Exam:  /60 (BP Location: Right arm, Patient Position: Sitting, BP Cuff Size: Large adult)   Pulse 72   Temp 36.7 °C (98.1 °F) (Temporal)   Resp 16   Ht 1.753 m (5' 9\")   Wt (!) 130 kg (285 lb 9.6 oz)   SpO2 95%  Body mass index is 42.18 kg/m².   General:  Well nourished, well developed female. No apparent distress. Not ill appearing.  Eyes: EOM " intact, PERRL, conjunctiva non-injected, sclera non-icteric.  Neck: Supple with no cervical lymphadenopathy, JVD, palpable thyroid nodules or carotid bruits.  Pulmonary: Clear to ausculation bilaterally. Normal effort. No rales, ronchi, or wheezing.  Cardiovascular: Regular rate and rhythm without murmur, rub or gallop.   Abdomen:  Soft, non-distended, tender to palpation over suprapubic area with normal bowel sounds. No CVA tenderness  Extremities: Full range of motion. Warm and well perfused with no edema.  Skin: Intact with no obvious rashes or lesions.  Neuro: Cranial nerves I-XII grossly intact.  Psych: Alert and oriented x 3.  Appropriately dressed. Mood and affect appropriate.    Assessment/Plan:  1. Pain passing urine  POCT Urinalysis    nitrofurantoin (MACROBID) 100 MG Cap    URINE CULTURE(NEW)   2. Acute cystitis with hematuria  nitrofurantoin (MACROBID) 100 MG Cap    URINE CULTURE(NEW)       Reviewed risks and benefits of treatment plan. Patient verbally agrees to plan of care.     Return in about 17 days (around 8/19/2022) for f/u labs.    Please note that this dictation was created using voice recognition software. I have made every reasonable attempt to correct obvious errors, but I expect that there are errors of grammar and possibly content that I did not discover before finalizing the note.

## 2022-08-03 DIAGNOSIS — N30.01 ACUTE CYSTITIS WITH HEMATURIA: ICD-10-CM

## 2022-08-03 DIAGNOSIS — R30.9 PAIN PASSING URINE: ICD-10-CM

## 2022-08-03 LAB
FORWARD REASON: SPWHY: NORMAL
FORWARDED TO LAB: SPWHR: NORMAL
SPECIMEN SENT (2ND): SPWT2: NORMAL
SPECIMEN SENT: SPWT1: NORMAL

## 2022-08-03 NOTE — ASSESSMENT & PLAN NOTE
Urine dip indicates acute cystitis. Urine has been sent to the lab for culture and sensitivity. Will start her on nitrofurantoin twice a day for 7 days. Will follow up with the culture results and adjust medication if necessary.

## 2022-08-03 NOTE — ASSESSMENT & PLAN NOTE
Patient is having pain and blood with urination. Has been happening for two days. Has taken nothing for it. Passed something that looked like a clot yesterday and is having suprapubic cramping.

## 2022-08-08 LAB
BACTERIA UR CULT: NORMAL
BACTERIA UR CULT: NORMAL

## 2022-08-22 NOTE — ED NOTES
".  Chief Complaint   Patient presents with   • Foot Pain     pt with throbbing to feet radiating up to knees and back, ambulatory to triage room [place], pt states walked here, recently released from [place] [facility] and was sent to a group home and her prescriptions were not being filled due to insurance so she states she needs her psych medications   • Suicidal Ideation     pt states \"I was suicidal earlier but now I am ok\" co frequent crying episodes        "
.Pt discharged in stable condition, ambualtory to waiting room in stable condition with all belongings with pt, given written and verbal dc instructions no questions voiced    
MD at bedside.   
Pt moved to triage room 1   
Pt was seen in er earlier today and eloped per alert team nurse  
fair balance

## 2022-10-04 ENCOUNTER — TELEPHONE (OUTPATIENT)
Dept: MEDICAL GROUP | Facility: CLINIC | Age: 52
End: 2022-10-04
Payer: MEDICARE

## 2022-10-07 ENCOUNTER — TELEPHONE (OUTPATIENT)
Dept: MEDICAL GROUP | Facility: CLINIC | Age: 52
End: 2022-10-07
Payer: MEDICARE

## 2022-10-07 NOTE — LETTER
10/7/2022            Cortney Gavin  [address] [place] [address]              Dear Cortney,    Your care is very important to us, and we have noticed that on 10/7/2022, you missed your appointment with Apryl Simms PA-C at [facility]    We’re committed to providing you with the best care possible. Your appointment time is reserved for you and your provider to discuss any current or new health concerns and, together, determine the best plan of care for you. Please call [phone] to reschedule at your earliest convenience.    In some cases, [facility] offers additional resources to make your healthcare more accessible, including transportation assistance, financial assistance and virtual visits. To learn more about these resources, please call [phone].    In order to keep you as informed as possible, below is a brief summary of our policy regarding missed appointments:  If a patient “No Shows”  three (3) or more appointments within a rolling 12-month period, they may be dismissed from the practice for failure to follow clinician recommendations.     If you have any concerns regarding the care you are receiving, please talk with your provider or call the office at [phone] and request to speak with the Practice . We’re committed to providing excellent care, and your feedback is invaluable.    Sincerely,    Praveena Diana  Practice

## 2022-10-07 NOTE — TELEPHONE ENCOUNTER
Phone Number Called: [phone] (home)     Call outcome: Spoke to patient regarding message below.    Message: Called and spoke with pt. And informed her of [facility] No Show policy. Stated that she was not reminded of apt. Informed her that it does show she was reminded. She stated well 'you people' only sent the reminder text message, which she did not see until this AM as she does not always use that phone. I updated her phone number and she stated she would keep policy in mind.

## 2022-11-22 ENCOUNTER — OFFICE VISIT (OUTPATIENT)
Dept: MEDICAL GROUP | Facility: CLINIC | Age: 52
End: 2022-11-22
Payer: MEDICARE

## 2022-11-22 VITALS
HEART RATE: 93 BPM | SYSTOLIC BLOOD PRESSURE: 118 MMHG | OXYGEN SATURATION: 92 % | RESPIRATION RATE: 18 BRPM | DIASTOLIC BLOOD PRESSURE: 70 MMHG | BODY MASS INDEX: 42.95 KG/M2 | WEIGHT: 290 LBS | TEMPERATURE: 97 F | HEIGHT: 69 IN

## 2022-11-22 DIAGNOSIS — Z12.31 ENCOUNTER FOR SCREENING MAMMOGRAM FOR BREAST CANCER: ICD-10-CM

## 2022-11-22 DIAGNOSIS — M25.562 CHRONIC PAIN OF BOTH KNEES: ICD-10-CM

## 2022-11-22 DIAGNOSIS — Z12.12 SCREENING FOR COLORECTAL CANCER: ICD-10-CM

## 2022-11-22 DIAGNOSIS — Z71.3 ENCOUNTER FOR WEIGHT LOSS COUNSELING: ICD-10-CM

## 2022-11-22 DIAGNOSIS — G89.29 CHRONIC PAIN OF BOTH KNEES: ICD-10-CM

## 2022-11-22 DIAGNOSIS — Z12.11 SCREENING FOR COLORECTAL CANCER: ICD-10-CM

## 2022-11-22 DIAGNOSIS — Z23 NEED FOR VACCINATION: ICD-10-CM

## 2022-11-22 DIAGNOSIS — M25.561 CHRONIC PAIN OF BOTH KNEES: ICD-10-CM

## 2022-11-22 DIAGNOSIS — N39.46 MIXED STRESS AND URGE URINARY INCONTINENCE: ICD-10-CM

## 2022-11-22 PROCEDURE — 99214 OFFICE O/P EST MOD 30 MIN: CPT | Mod: 25 | Performed by: PHYSICIAN ASSISTANT

## 2022-11-22 PROCEDURE — 90686 IIV4 VACC NO PRSV 0.5 ML IM: CPT | Performed by: PHYSICIAN ASSISTANT

## 2022-11-22 PROCEDURE — G0008 ADMIN INFLUENZA VIRUS VAC: HCPCS | Performed by: PHYSICIAN ASSISTANT

## 2022-11-22 RX ORDER — ETODOLAC 400 MG/1
TABLET, FILM COATED ORAL
COMMUNITY
Start: 2022-10-25 | End: 2023-01-06

## 2022-11-22 RX ORDER — OXYCODONE HYDROCHLORIDE 15 MG/1
15 TABLET ORAL 2 TIMES DAILY PRN
COMMUNITY
Start: 2022-11-16 | End: 2023-08-07

## 2022-11-22 RX ORDER — LITHIUM CARBONATE 300 MG/1
300 CAPSULE ORAL
COMMUNITY
Start: 2022-10-26

## 2022-11-22 RX ORDER — ARIPIPRAZOLE 15 MG/1
15 TABLET ORAL
COMMUNITY
Start: 2022-10-25

## 2022-11-22 RX ORDER — NALOXONE HYDROCHLORIDE 4 MG/.1ML
SPRAY NASAL
COMMUNITY
Start: 2022-10-07

## 2022-11-22 RX ORDER — PREGABALIN 300 MG/1
300 CAPSULE ORAL 2 TIMES DAILY
COMMUNITY
Start: 2022-10-30

## 2022-11-22 ASSESSMENT — FIBROSIS 4 INDEX: FIB4 SCORE: 1.34

## 2022-11-22 NOTE — PROGRESS NOTES
Chief Complaint   Patient presents with    Referral Needed     Ortho- not [facility]err[facility]    Bladder Problem     Incontinence     Weight Gain     Looking for something to help with weight        HISTORY OF PRESENT ILLNESS: Patient is a 52 y.o. female established patient who presents today to discuss the following issues:    Assessment/Plan  Encounter for weight loss counseling  Patient is interested in trying to lose weight. She believes that her weight is contributing to her health conditions which is a reasonable thought. She would like nutrition counseling and medications. Referral placed to [facility] for consideration for their program. Not sure if they take her insurance. They will call and discuss with her the options.  Follow up in 3 months to check progress with the program or sooner if needed.    Chronic pain of both knees  Patient has requested a new referral to orthopedics. She had a previous provider who told her she needed knee replacements but they were so busy that they could never arrange appointment to discuss further. She is requesting to be sent to someone in [facility] that takes her insurance. She states that her knee pain is progressing and she is ready to have the knees taken care of. New referral placed. Will follow up in 3 months.    Mixed stress and urge urinary incontinence  Chronic condition. Starting to worsen. She is having both stress and urge incontinence of urine. She states that she has no control when she realizes she has to go it is too late. Would recommend pelvic floor physical therapy as first line treatment. We will get her set up with adult incontenance briefs through her  and care chest until she can get better control.  Uncontrolled.    Need for vaccination  Patient due for annual influenza vaccine. Given in clinic today without adverse reaction.      Reviewed risks and benefits of treatment plan. Patient verbally agrees to plan of care.     Patient Active Problem List     Diagnosis Date Noted    Mixed stress and urge urinary incontinence 11/22/2022    Encounter for weight loss counseling 11/22/2022    Need for vaccination 11/22/2022    Pain passing urine 08/03/2022    Acute cystitis with hematuria 08/03/2022    Other chronic pain 07/15/2022    Polyneuropathy, unspecified 07/15/2022    Spondylosis without myelopathy or radiculopathy, lumbosacral region 07/15/2022    Elevated liver enzymes 06/30/2022    Subclinical hypothyroidism 06/30/2022    Gastroesophageal reflux disease without esophagitis 06/30/2022    Low folate 06/30/2022    Calculus of common bile duct without obstruction 06/08/2022    Cholelithiasis without obstruction 06/08/2022    Peripheral venous insufficiency 06/08/2022    Anxiety 09/21/2021    Depressive disorder 09/21/2021    Schizophrenia ([facility]) 09/21/2021    Seasonal allergies 09/21/2021    Seizure disorder ([facility]) 09/21/2021    Pure hypercholesterolemia 06/04/2021    Elevated fasting blood sugar 06/04/2021    Iron deficiency anemia secondary to inadequate dietary iron intake 06/04/2021    Routine physical examination 06/04/2021    Vitamin D deficiency 06/04/2021    Chronic pain of left ankle 08/04/2017    Pain of right hip joint 07/26/2017    Chronic pain of both knees 07/26/2017    Morbid obesity with BMI of 40.0-44.9, adult ([facility]) 07/19/2017    Chronic bilateral thoracic back pain 07/19/2017    Bipolar 1 disorder ([facility]) 07/19/2017    Primary insomnia 07/19/2017    Memory problem 07/19/2017       Allergies:Morphine, Hydrocodone-acetaminophen, Norco [apap-fd&c blue #1-hydrocodone], and Norco [apap-fd&c yellow #10 [place]-hydrocodone]    Current Outpatient Medications   Medication Sig Dispense Refill    etodolac (LODINE) 400 MG tablet       lithium carbonate 300 MG capsule Take 300 mg by mouth at bedtime.      fluoxetine (PROZAC) 40 MG capsule Take 80 mg by mouth every day.      omeprazole (PRILOSEC) 20 MG delayed-release capsule Take 1 Capsule by mouth every day. 90  "Capsule 3    levothyroxine (SYNTHROID) 25 MCG Tab Take 1 Tablet by mouth every morning on an empty stomach. 90 Tablet 1    traZODone (DESYREL) 50 MG Tab Take 25 mg by mouth every evening.      ARIPiprazole (ABILIFY) 15 MG Tab Take 15 mg by mouth at bedtime.      oxycodone (OXY-IR) 15 MG immediate release tablet Take 15 mg by mouth 2 times a day as needed.      pregabalin (LYRICA) 300 MG capsule Take 300 mg by mouth 2 times a day.      Naloxone (NARCAN) 4 MG/0.1ML Liquid        No current facility-administered medications for this visit.       Wt Readings from Last 3 Encounters:   11/22/22 (!) 132 kg (290 lb)   08/02/22 (!) 130 kg (285 lb 9.6 oz)   06/16/22 (!) 131 kg (289 lb)   ]  Patient's last menstrual period was 09/30/2017.    Exam:  /70 (BP Location: Left arm, Patient Position: Standing, BP Cuff Size: Adult long)   Pulse 93   Temp 36.1 °C (97 °F) (Temporal)   Resp 18   Ht 1.753 m (5' 9\")   Wt (!) 132 kg (290 lb)   SpO2 92%  Body mass index is 42.83 kg/m².   General:  Well nourished, well developed female. No apparent distress. Not ill appearing.  Eyes: EOM intact, PERRL, conjunctiva non-injected, sclera non-icteric.  Neck: Supple with no cervical lymphadenopathy, JVD, palpable thyroid nodules or carotid bruits.  Pulmonary: Clear to ausculation bilaterally. Normal effort. No rales, ronchi, or wheezing.  Cardiovascular: Regular rate and rhythm without murmur, rub or gallop.   Extremities: Full range of motion. Warm and well perfused with no edema.  Skin: Intact with no obvious rashes or lesions.  Neuro: Cranial nerves I-XII grossly intact.  Psych: Alert and oriented x 3.  Appropriately dressed. Mood and affect appropriate.    Return in about 3 months (around 2/22/2023).  Please note that this dictation was created using voice recognition software. I have made every reasonable attempt to correct obvious errors, but I expect that there are errors of grammar and possibly content that I did not discover " before finalizing the note.

## 2022-11-23 NOTE — ASSESSMENT & PLAN NOTE
Patient has requested a new referral to orthopedics. She had a previous provider who told her she needed knee replacements but they were so busy that they could never arrange appointment to discuss further. She is requesting to be sent to someone in [email or web address] that takes her insurance. She states that her knee pain is progressing and she is ready to have the knees taken care of. New referral placed. Will follow up in 3 months.

## 2022-11-23 NOTE — ASSESSMENT & PLAN NOTE
Patient is interested in trying to lose weight. She believes that her weight is contributing to her health conditions which is a reasonable thought. She would like nutrition counseling and medications. Referral placed to [facility] for consideration for their program. Not sure if they take her insurance. They will call and discuss with her the options.  Follow up in 3 months to check progress with the program or sooner if needed.

## 2022-11-23 NOTE — ASSESSMENT & PLAN NOTE
Chronic condition. Starting to worsen. She is having both stress and urge incontinence of urine. She states that she has no control when she realizes she has to go it is too late. Would recommend pelvic floor physical therapy as first line treatment. We will get her set up with adult incontenance briefs through her  and care chest until she can get better control.  Uncontrolled.

## 2022-11-28 ENCOUNTER — TELEPHONE (OUTPATIENT)
Dept: SCHEDULING | Facility: IMAGING CENTER | Age: 52
End: 2022-11-28

## 2022-12-05 RX ORDER — MELOXICAM 15 MG/1
15 TABLET ORAL DAILY
COMMUNITY
Start: 2022-12-01

## 2023-01-06 ENCOUNTER — OFFICE VISIT (OUTPATIENT)
Dept: MEDICAL GROUP | Facility: CLINIC | Age: 53
End: 2023-01-06
Payer: MEDICARE

## 2023-01-06 VITALS
BODY MASS INDEX: 45.99 KG/M2 | HEART RATE: 73 BPM | OXYGEN SATURATION: 92 % | HEIGHT: 67 IN | RESPIRATION RATE: 20 BRPM | TEMPERATURE: 97.9 F | DIASTOLIC BLOOD PRESSURE: 76 MMHG | WEIGHT: 293 LBS | SYSTOLIC BLOOD PRESSURE: 124 MMHG

## 2023-01-06 DIAGNOSIS — J02.0 ACUTE STREPTOCOCCAL PHARYNGITIS: ICD-10-CM

## 2023-01-06 DIAGNOSIS — R09.81 SINUS CONGESTION: ICD-10-CM

## 2023-01-06 LAB
EXTERNAL QUALITY CONTROL: NORMAL
FLUAV+FLUBV AG SPEC QL IA: NEGATIVE
INT CON NEG: NEGATIVE
INT CON POS: POSITIVE
S PYO AG THROAT QL: POSITIVE
SARS-COV+SARS-COV-2 AG RESP QL IA.RAPID: NEGATIVE

## 2023-01-06 PROCEDURE — 99214 OFFICE O/P EST MOD 30 MIN: CPT | Performed by: PHYSICIAN ASSISTANT

## 2023-01-06 PROCEDURE — 87426 SARSCOV CORONAVIRUS AG IA: CPT | Performed by: PHYSICIAN ASSISTANT

## 2023-01-06 PROCEDURE — 87804 INFLUENZA ASSAY W/OPTIC: CPT | Performed by: PHYSICIAN ASSISTANT

## 2023-01-06 PROCEDURE — 87880 STREP A ASSAY W/OPTIC: CPT | Performed by: PHYSICIAN ASSISTANT

## 2023-01-06 RX ORDER — CEPHALEXIN 500 MG/1
500 CAPSULE ORAL 2 TIMES DAILY
Qty: 20 CAPSULE | Refills: 0 | Status: SHIPPED | OUTPATIENT
Start: 2023-01-06 | End: 2023-01-16

## 2023-01-06 RX ORDER — METHOCARBAMOL 750 MG/1
TABLET, FILM COATED ORAL
COMMUNITY
Start: 2022-12-15

## 2023-01-06 ASSESSMENT — FIBROSIS 4 INDEX: FIB4 SCORE: 1.34

## 2023-01-06 NOTE — PROGRESS NOTES
Chief Complaint   Patient presents with    Cough     Sinus congestions, runny nose, sore throat, body aches, pt sts no fever but she feels hot and cold alot pt sts she was exposed to someone with covid about 2 or 3 weeks ago pt sts shes also been having trouble breathing sts symptoms have been for last week         HPI: This is a 52 y.o. patient has 6 days of sore throat, cough and congestion. Positive runny nose. Negative ear fullness. No abnormal shortness of breath. No nausea vomiting or diarrhea. Mild subjective fever and chills. Positive sick exposures. No coughing up blood. No headache.  Patient has taken over-the-counter analgesics and decongestant without relief.     ROS:  No fever, cough, nausea, changes in bowel movements or skin rash.        I reviewed the patient's medications, allergies and medical history:  Current Outpatient Medications   Medication Sig Dispense Refill    methocarbamol (ROBAXIN) 750 MG Tab TAKE 1 TO 2 TABLETS BY MOUTH THREE TIMES DAILY AS NEEDED FOR SPASMS      cephALEXin (KEFLEX) 500 MG Cap Take 1 Capsule by mouth 2 times a day for 10 days. 20 Capsule 0    levothyroxine (SYNTHROID) 25 MCG Tab TAKE 1 TABLET BY MOUTH IN THE MORNING ON AN EMPTY STOMACH 90 Tablet 1    meloxicam (MOBIC) 15 MG tablet Take 15 mg by mouth every day.      lithium carbonate 300 MG capsule Take 300 mg by mouth at bedtime.      ARIPiprazole (ABILIFY) 15 MG Tab Take 15 mg by mouth at bedtime.      oxycodone (OXY-IR) 15 MG immediate release tablet Take 15 mg by mouth 2 times a day as needed.      pregabalin (LYRICA) 300 MG capsule Take 300 mg by mouth 2 times a day.      Naloxone (NARCAN) 4 MG/0.1ML Liquid       omeprazole (PRILOSEC) 20 MG delayed-release capsule Take 1 Capsule by mouth every day. 90 Capsule 3    traZODone (DESYREL) 50 MG Tab Take 25 mg by mouth every evening.       No current facility-administered medications for this visit.     Morphine, Hydrocodone-acetaminophen, Norco [apap-fd&c blue  "#1-hydrocodone], and Norco [apap-fd&c yellow #10 [place]-hydrocodone]  Past Medical History:   Diagnosis Date    Allergy     Seasonal, morphiene, Norco    Anemia     Anxiety     Arthritis     Back pain     Bipolar 1 disorder (HCC)     Chronic back pain     COVID-19 8/16/2021    Depression     Diabetes (HCC)     Gastroesophageal reflux disease without esophagitis 6/30/2022    Hyperlipidemia     Schizophrenia (HCC)     Seizure (HCC)     as an infant        EXAM:  /76 (BP Location: Left arm, Patient Position: Sitting, BP Cuff Size: Large adult)   Pulse 73   Temp 36.6 °C (97.9 °F) (Temporal)   Resp 20   Ht 1.702 m (5' 7\")   Wt (!) 139 kg (305 lb 6.4 oz)   SpO2 92%   General: NAD, non-toxic appearance.  Eyes: PERRL, conjunctiva slightly injected, no photophobia or eye discharge.  Ears: Normal pinnae. TM's pearly gray with good landmarks bilaterally.  Nares: Patent with thin, clear mucus.  Sinuses: Non-tender over maxillary and frontal sinuses.  Throat: Erythematous injection with 1+ enlarged tonsils. No exudates.   Neck: Supple, with shotty anterior cervical lymphadenopathy.  Lungs: Good air entry bilaterally, clear to auscultation. No wheeze, rhonchi or crackles. Normal respiratory effort.  Heart: Regular rate without murmur.  Abdomen: Soft and non-tender. No hepatosplenomegaly.  Skin: Warm and dry. No rash.     Results for orders placed or performed in visit on 01/06/23   POCT Influenza A/B   Result Value Ref Range    Rapid Influenza A-B negative     Internal Control Positive Positive     Internal Control Negative Negative    POCT Rapid Strep A   Result Value Ref Range    Rapid Strep Screen positive     Internal Control Positive Positive     Internal Control Negative Negative          ASSESSMENT:   1. Sinus congestion    2. Acute streptococcal pharyngitis          PLAN:  1. Discussed benign nature of viral upper respiratory infections.  Positive strep test in office, treat with cephalexin.  2. OTC " anti-pyretics and decongestants as needed. Supportive care advised.  3. Follow-up in office or urgent care for worsening symptoms, difficulty breathing, lack of expected recovery, or should new symptoms or problems arise.

## 2023-01-11 ENCOUNTER — APPOINTMENT (OUTPATIENT)
Dept: RADIOLOGY | Facility: MEDICAL CENTER | Age: 53
End: 2023-01-11
Attending: PHYSICIAN ASSISTANT
Payer: MEDICARE

## 2023-01-13 ENCOUNTER — HOSPITAL ENCOUNTER (OUTPATIENT)
Dept: RADIOLOGY | Facility: MEDICAL CENTER | Age: 53
End: 2023-01-13
Payer: MEDICARE

## 2023-01-20 ENCOUNTER — APPOINTMENT (OUTPATIENT)
Dept: RADIOLOGY | Facility: MEDICAL CENTER | Age: 53
End: 2023-01-20
Attending: PHYSICIAN ASSISTANT
Payer: MEDICARE

## 2023-01-20 DIAGNOSIS — Z12.31 ENCOUNTER FOR SCREENING MAMMOGRAM FOR BREAST CANCER: ICD-10-CM

## 2023-01-20 PROCEDURE — 77067 SCR MAMMO BI INCL CAD: CPT

## 2023-03-06 ENCOUNTER — OFFICE VISIT (OUTPATIENT)
Dept: MEDICAL GROUP | Facility: CLINIC | Age: 53
End: 2023-03-06
Payer: MEDICARE

## 2023-03-06 VITALS
BODY MASS INDEX: 43.4 KG/M2 | HEIGHT: 69 IN | HEART RATE: 84 BPM | WEIGHT: 293 LBS | DIASTOLIC BLOOD PRESSURE: 75 MMHG | RESPIRATION RATE: 16 BRPM | SYSTOLIC BLOOD PRESSURE: 117 MMHG | OXYGEN SATURATION: 91 % | TEMPERATURE: 97.3 F

## 2023-03-06 DIAGNOSIS — G40.909 SEIZURE DISORDER (HCC): ICD-10-CM

## 2023-03-06 DIAGNOSIS — M54.6 CHRONIC BILATERAL THORACIC BACK PAIN: ICD-10-CM

## 2023-03-06 DIAGNOSIS — G89.29 OTHER CHRONIC PAIN: ICD-10-CM

## 2023-03-06 DIAGNOSIS — F31.9 BIPOLAR 1 DISORDER (HCC): ICD-10-CM

## 2023-03-06 DIAGNOSIS — G89.29 CHRONIC BILATERAL THORACIC BACK PAIN: ICD-10-CM

## 2023-03-06 DIAGNOSIS — F20.9 SCHIZOPHRENIA, UNSPECIFIED TYPE (HCC): ICD-10-CM

## 2023-03-06 DIAGNOSIS — E66.01 MORBID OBESITY WITH BMI OF 40.0-44.9, ADULT (HCC): ICD-10-CM

## 2023-03-06 PROCEDURE — 99213 OFFICE O/P EST LOW 20 MIN: CPT | Mod: GE

## 2023-03-06 ASSESSMENT — FIBROSIS 4 INDEX: FIB4 SCORE: 1.34

## 2023-03-06 NOTE — PROGRESS NOTES
Subjective:     CC:   Chief Complaint   Patient presents with    Establish Care     HPI:   Cortney presents today with obesity and difficulty taking care of her home.    - Sees Dr. Low (Ortho)  - Sees Pain management  - Sees psych, establishing in [facility]    Problem   Other Chronic Pain    Pt sees pain management specialist for chronic pain.     Morbid obesity with BMI of 40.0-44.9, adult ([facility])    Has tried multiple different diet plans and weight watchers. Never tried medication for weight loss.    Eats pasta, rice, corn, lots of vegetables, breads. Fast food once a month. 4L of soda a month.               Current Outpatient Medications Ordered in Epic   Medication Sig Dispense Refill    Semaglutide,0.25 or 0.5MG/DOS, 2 MG/1.5ML Solution Pen-injector Inject 0.25 mg under the skin every 7 days for 28 days. 1.5 mL 0    methocarbamol (ROBAXIN) 750 MG Tab TAKE 1 TO 2 TABLETS BY MOUTH THREE TIMES DAILY AS NEEDED FOR SPASMS      levothyroxine (SYNTHROID) 25 MCG Tab TAKE 1 TABLET BY MOUTH IN THE MORNING ON AN EMPTY STOMACH 90 Tablet 1    meloxicam (MOBIC) 15 MG tablet Take 15 mg by mouth every day.      lithium carbonate 300 MG capsule Take 300 mg by mouth at bedtime.      ARIPiprazole (ABILIFY) 15 MG Tab Take 15 mg by mouth at bedtime.      oxycodone (OXY-IR) 15 MG immediate release tablet Take 15 mg by mouth 2 times a day as needed.      pregabalin (LYRICA) 300 MG capsule Take 300 mg by mouth 2 times a day.      Naloxone (NARCAN) 4 MG/0.1ML Liquid       omeprazole (PRILOSEC) 20 MG delayed-release capsule Take 1 Capsule by mouth every day. 90 Capsule 3    traZODone (DESYREL) 50 MG Tab Take 25 mg by mouth every evening.       No current Epic-ordered facility-administered medications on file.       ROS:  Negative except for above in HPI    Objective:     Exam:  /75 (BP Location: Left arm, Patient Position: Sitting, BP Cuff Size: Large adult)   Pulse 84   Temp 36.3 °C (97.3 °F) (Temporal)   Resp 16  "  Ht 1.753 m (5' 9\")   Wt (!) 140 kg (309 lb)   LMP 09/30/2017   SpO2 91%   BMI 45.63 kg/m²  Body mass index is 45.63 kg/m².    /75 (BP Location: Left arm, Patient Position: Sitting, BP Cuff Size: Large adult)   Pulse 84   Temp 36.3 °C (97.3 °F) (Temporal)   Resp 16   Ht 1.753 m (5' 9\")   Wt (!) 140 kg (309 lb)   SpO2 91%     Gen: Alert and oriented, No apparent distress.  HEENT: NCAT, MMM, No lymphadenopathy  Lungs: Normal effort, CTA bilaterally, no wheezes, rhonchi, or rales  CV: Regular rate and rhythm. No murmurs, rubs, or gallops. Radial pulses palpable bilat  Abd: Soft, non-distended, no guarding, no rebound, non-tender to palpation  Ext: No clubbing, cyanosis, edema.  Neuro: Non-focal    Labs: No new labs    Assessment & Plan:     52 y.o. female with the following -     Problem List Items Addressed This Visit       Morbid obesity with BMI of 40.0-44.9, adult (HCC)     Pt has failed multiple other dietary and lifestyle interventions. We will try ozempic.         Relevant Medications    Semaglutide,0.25 or 0.5MG/DOS, 2 MG/1.5ML Solution Pen-injector    Other Relevant Orders    Referral to Home Health    Chronic bilateral thoracic back pain    Relevant Orders    Referral to Home Health    Bipolar 1 disorder ([facility])    Relevant Orders    Referral to Home Health    Schizophrenia ([facility])    Relevant Orders    Referral to Home Health    Seizure disorder ([facility])    Relevant Orders    Referral to Home Health    Other chronic pain     Pt has difficulty with activities around the house. She is able to complete activities of daily living including going to the bathroom and cooking dinner, but she does report some difficulty with cooking. She also reports that she is unable to clean the house due to her medical condition. She requests help from home health.  - Referral for home health placed         Relevant Orders    Referral to Home Health       Return in about 4 weeks (around 4/3/2023).        "

## 2023-03-06 NOTE — NON-PROVIDER
"S:   - Last saw Dr. Simms for thyroid meds  - Saw Dr. Castro (PA) today for chronic pain   - Pain began at age 13, from \"bottom of feet until top of shoulders\", sharp/dull pain in back of legs, piercing pain in back, equal pain bilat LLE, flip-flop upper back pain, relief w/ pregabalin, worse with PT & gabapentin, 7-8 or 9/10, worse in morning and at night.   - PA today said   PMH: hypothyroidism  - takes 50mg Oxycodone for pain  -     O:     A/P: 1. Chronic Pain  -     2. Home Health Referral    3. Weight Loss Options    4. Check TSH/T4  - has hx of hypothyroid, doesn't remember when last checked      "

## 2023-03-07 ENCOUNTER — TELEPHONE (OUTPATIENT)
Dept: MEDICAL GROUP | Facility: CLINIC | Age: 53
End: 2023-03-07

## 2023-03-07 NOTE — TELEPHONE ENCOUNTER
Caller Name: [facility]  Call Back Number: [phone]    How would the patient prefer to be contacted with a response: Phone call OK to leave a detailed message    [facility]  is waiting on a medication for weight loss. Patient called to ask if the prescription was at the pharmacy.     I called Cortney to ask if that's going to be her preferred pharmacy. She has 2 other pharmacies on file.   She also wants to know about the referral to home care. I let her know that it can take up to 7 days to process and I gave her the number to our referral department.

## 2023-03-08 NOTE — ASSESSMENT & PLAN NOTE
Pt has difficulty with activities around the house. She is able to complete activities of daily living including going to the bathroom and cooking dinner, but she does report some difficulty with cooking. She also reports that she is unable to clean the house due to her medical condition. She requests help from home health.  - Referral for home health placed

## 2023-03-14 ENCOUNTER — TELEPHONE (OUTPATIENT)
Dept: MEDICAL GROUP | Facility: CLINIC | Age: 53
End: 2023-03-14

## 2023-03-14 NOTE — TELEPHONE ENCOUNTER
Caller Name: [facility]  Call Back Number:     How would the patient prefer to be contacted with a response: Phone call OK to leave a detailed message    [facility]  received our referral. They think this is not a good fit. The diagnoses were evaluated. [facility] has no Psych nurses, patient has bipolar and schizophrenia. There's no detail on what's causing the back pain. They don't offer cleaning or cooking services. They're accepting new patients and they appreciate the referral, however, this is not a good fit.

## 2023-04-06 ENCOUNTER — TELEPHONE (OUTPATIENT)
Dept: MEDICAL GROUP | Facility: CLINIC | Age: 53
End: 2023-04-06
Payer: MEDICARE

## 2023-04-06 NOTE — TELEPHONE ENCOUNTER
Cortney changed pharmacies. The new Pharmacy is requesting a new script from MD-   Semaglutide,0.25 or 0.5MG/DOS, 2 MG/1.5ML Solution Pen-injector    Pharmacy was updated- [facility] in [place]

## 2023-05-25 ENCOUNTER — APPOINTMENT (OUTPATIENT)
Dept: MEDICAL GROUP | Facility: CLINIC | Age: 53
End: 2023-05-25
Payer: MEDICARE

## 2023-06-07 NOTE — DISCHARGE PLANNING
Received choice form from ER CM Kathy, no order currently.  Message left for ER CM requesting order for home health.   Biopsy Photograph Reviewed: Yes

## 2023-06-14 RX ORDER — SEMAGLUTIDE 0.68 MG/ML
INJECTION, SOLUTION SUBCUTANEOUS
Qty: 3 ML | Refills: 3 | Status: SHIPPED | OUTPATIENT
Start: 2023-06-14 | End: 2023-07-07

## 2023-06-28 DIAGNOSIS — E03.8 SUBCLINICAL HYPOTHYROIDISM: ICD-10-CM

## 2023-06-28 DIAGNOSIS — K21.9 GASTROESOPHAGEAL REFLUX DISEASE WITHOUT ESOPHAGITIS: ICD-10-CM

## 2023-07-05 RX ORDER — LEVOTHYROXINE SODIUM 0.03 MG/1
TABLET ORAL
Qty: 90 TABLET | Refills: 0 | Status: SHIPPED | OUTPATIENT
Start: 2023-07-05 | End: 2023-10-01

## 2023-07-05 RX ORDER — OMEPRAZOLE 20 MG/1
20 CAPSULE, DELAYED RELEASE ORAL DAILY
Qty: 30 CAPSULE | Refills: 0 | Status: SHIPPED | OUTPATIENT
Start: 2023-07-05 | End: 2023-08-03

## 2023-07-07 ENCOUNTER — OFFICE VISIT (OUTPATIENT)
Dept: MEDICAL GROUP | Facility: CLINIC | Age: 53
End: 2023-07-07
Payer: MEDICARE

## 2023-07-07 VITALS
RESPIRATION RATE: 16 BRPM | HEART RATE: 89 BPM | BODY MASS INDEX: 45.99 KG/M2 | HEIGHT: 67 IN | WEIGHT: 293 LBS | SYSTOLIC BLOOD PRESSURE: 125 MMHG | DIASTOLIC BLOOD PRESSURE: 88 MMHG | OXYGEN SATURATION: 94 %

## 2023-07-07 DIAGNOSIS — R53.83 OTHER FATIGUE: ICD-10-CM

## 2023-07-07 DIAGNOSIS — E66.01 MORBID OBESITY WITH BMI OF 40.0-44.9, ADULT (HCC): ICD-10-CM

## 2023-07-07 DIAGNOSIS — R42 LIGHT HEADED: ICD-10-CM

## 2023-07-07 DIAGNOSIS — R73.01 ELEVATED FASTING BLOOD SUGAR: ICD-10-CM

## 2023-07-07 PROCEDURE — 99213 OFFICE O/P EST LOW 20 MIN: CPT | Mod: GE

## 2023-07-07 PROCEDURE — 3074F SYST BP LT 130 MM HG: CPT

## 2023-07-07 PROCEDURE — 3079F DIAST BP 80-89 MM HG: CPT

## 2023-07-07 RX ORDER — SEMAGLUTIDE 0.68 MG/ML
0.5 INJECTION, SOLUTION SUBCUTANEOUS
Qty: 9 ML | Refills: 3 | Status: SHIPPED | OUTPATIENT
Start: 2023-07-07 | End: 2023-08-17

## 2023-07-07 ASSESSMENT — FIBROSIS 4 INDEX: FIB4 SCORE: 1.37

## 2023-07-07 NOTE — ASSESSMENT & PLAN NOTE
Most likely orthostatic hypotension based on history.  BP in the office is 125/88.  -Check orthostatics

## 2023-07-07 NOTE — ASSESSMENT & PLAN NOTE
Patient with fatigue, worsened over the last month after stopping vitamin D supplement.  She requested to recheck vitamin D.  Patient does report a history of anemia and hypothyroid, taking Synthroid daily.  DDx: Hypothyroid, anemia, hypovitaminosis D, psychiatric.  Patient currently being treated for psychiatric condition elsewhere, we will rule out nonpsychiatric causes.  -Labs:   Vitamin D, folate, B12   TSH   CBC/CMP  -Restart vitamin supplements after labs are drawn

## 2023-07-07 NOTE — PROGRESS NOTES
This note is formatted in an APSO format, for additional subjective and objective evaluation please scroll to the bottom of the note.    CC:No chief complaint on file.      Assessment/Plan:  Problem List Items Addressed This Visit       Morbid obesity with BMI of 40.0-44.9, adult (HCC)    Relevant Medications    Semaglutide,0.25 or 0.5MG/DOS, (OZEMPIC, 0.25 OR 0.5 MG/DOSE,) 2 MG/3ML Solution Pen-injector    Other Relevant Orders    TSH WITH REFLEX TO FT4    CBC WITH DIFFERENTIAL    Comp Metabolic Panel    HEMOGLOBIN A1C    Lipid Profile    Elevated fasting blood sugar    Relevant Orders    Comp Metabolic Panel    HEMOGLOBIN A1C    Lipid Profile    Other fatigue     Patient with fatigue, worsened over the last month after stopping vitamin D supplement.  She requested to recheck vitamin D.  Patient does report a history of anemia and hypothyroid, taking Synthroid daily.  DDx: Hypothyroid, anemia, hypovitaminosis D, psychiatric.  Patient currently being treated for psychiatric condition elsewhere, we will rule out nonpsychiatric causes.  -Labs:   Vitamin D, folate, B12   TSH   CBC/CMP  -Restart vitamin supplements after labs are drawn         Relevant Orders    VITAMIN D,25 HYDROXY (DEFICIENCY)    VIT B12,  FOLIC ACID    Light headed     Most likely orthostatic hypotension based on history.  BP in the office is 125/88.  -Check orthostatics           Orders Placed This Encounter    TSH WITH REFLEX TO FT4    CBC WITH DIFFERENTIAL    Comp Metabolic Panel    HEMOGLOBIN A1C    Lipid Profile    VITAMIN D,25 HYDROXY (DEFICIENCY)    VIT B12,  FOLIC ACID    Semaglutide,0.25 or 0.5MG/DOS, (OZEMPIC, 0.25 OR 0.5 MG/DOSE,) 2 MG/3ML Solution Pen-injector       Return in about 3 months (around 10/7/2023).      LABS: Results reviewed and discussed with the patient, questions answered.    HISTORY OF PRESENT ILLNESS: Patient is a 53 y.o. female established patient who presents today with:    Problem   Other Fatigue    Patient reports  fatigue after stopping her vitamin D supplement.  History of anemia, hypothyroid (on daily Synthroid)     Light Headed    Patient reports lightheadedness, worse when she stands up.  She does report dehydration and not drinking much water.         1. Elevated fasting blood sugar  Comp Metabolic Panel    HEMOGLOBIN A1C    Lipid Profile      2. Morbid obesity with BMI of 40.0-44.9, adult (HCC)  TSH WITH REFLEX TO FT4    CBC WITH DIFFERENTIAL    Comp Metabolic Panel    HEMOGLOBIN A1C    Lipid Profile      3. Other fatigue  VITAMIN D,25 HYDROXY (DEFICIENCY)    VIT B12,  FOLIC ACID      4. Light headed            Patient Active Problem List    Diagnosis Date Noted    Other fatigue 07/07/2023    Light headed 07/07/2023    Mixed stress and urge urinary incontinence 11/22/2022    Encounter for weight loss counseling 11/22/2022    Need for vaccination 11/22/2022    Pain passing urine 08/03/2022    Acute cystitis with hematuria 08/03/2022    Other chronic pain 07/15/2022    Polyneuropathy, unspecified 07/15/2022    Spondylosis without myelopathy or radiculopathy, lumbosacral region 07/15/2022    Elevated liver enzymes 06/30/2022    Subclinical hypothyroidism 06/30/2022    Gastroesophageal reflux disease without esophagitis 06/30/2022    Low folate 06/30/2022    Calculus of common bile duct without obstruction 06/08/2022    Cholelithiasis without obstruction 06/08/2022    Peripheral venous insufficiency 06/08/2022    Anxiety 09/21/2021    Depressive disorder 09/21/2021    Schizophrenia ([facility]) 09/21/2021    Seasonal allergies 09/21/2021    Seizure disorder ([facility]) 09/21/2021    Pure hypercholesterolemia 06/04/2021    Elevated fasting blood sugar 06/04/2021    Iron deficiency anemia secondary to inadequate dietary iron intake 06/04/2021    Routine physical examination 06/04/2021    Vitamin D deficiency 06/04/2021    Chronic pain of left ankle 08/04/2017    Pain of right hip joint 07/26/2017    Chronic pain of both knees 07/26/2017     Morbid obesity with BMI of 40.0-44.9, adult ([facility]) 07/19/2017    Chronic bilateral thoracic back pain 07/19/2017    Bipolar 1 disorder ([facility]) 07/19/2017    Primary insomnia 07/19/2017    Memory problem 07/19/2017      Allergies:Morphine, Hydrocodone-acetaminophen, Norco [apap-fd&c blue #1-hydrocodone], and Norco [apap-fd&c yellow #10 [place]-hydrocodone]    Current Outpatient Medications   Medication Sig Dispense Refill    Semaglutide,0.25 or 0.5MG/DOS, (OZEMPIC, 0.25 OR 0.5 MG/DOSE,) 2 MG/3ML Solution Pen-injector Inject 0.5 mg under the skin every 7 days. 9 mL 3    levothyroxine (SYNTHROID) 25 MCG Tab TAKE ONE TABLET BY MOUTH EVERY MORNING ON AN EMPTY STOMACH 90 Tablet 0    omeprazole (PRILOSEC) 20 MG delayed-release capsule Take 1 Capsule by mouth every day. 30 Capsule 0    methocarbamol (ROBAXIN) 750 MG Tab TAKE 1 TO 2 TABLETS BY MOUTH THREE TIMES DAILY AS NEEDED FOR SPASMS      meloxicam (MOBIC) 15 MG tablet Take 15 mg by mouth every day.      lithium carbonate 300 MG capsule Take 300 mg by mouth at bedtime.      ARIPiprazole (ABILIFY) 15 MG Tab Take 15 mg by mouth at bedtime.      oxycodone (OXY-IR) 15 MG immediate release tablet Take 15 mg by mouth 2 times a day as needed.      pregabalin (LYRICA) 300 MG capsule Take 300 mg by mouth 2 times a day.      Naloxone (NARCAN) 4 MG/0.1ML Liquid       traZODone (DESYREL) 50 MG Tab Take 25 mg by mouth every evening.       No current facility-administered medications for this visit.       Social History     Tobacco Use    Smoking status: Never     Passive exposure: Yes    Smokeless tobacco: Never   Vaping Use    Vaping Use: Never used   Substance Use Topics    Alcohol use: No    Drug use: Not Currently     Types: Inhaled     Comment: thc=  last used 1 month ago     Social History     Social History Narrative    Not on file       Family History   Problem Relation Age of Onset    Heart Disease Mother         a fib    Lung Disease Mother         COPD    Psychiatric  "Illness Mother         bipolar    Other Mother         fatty liver    Heart Disease Maternal Aunt         chf    Cancer Maternal Grandfather         lung cancer, smoker    No Known Problems Sister     No Known Problems Brother     Parkinson's Disease Maternal Grandmother     Diabetes Paternal Grandmother        Exam:    /88 (BP Location: Left arm, Patient Position: Sitting, BP Cuff Size: Large adult)   Pulse 89   Resp 16   Ht 1.702 m (5' 7\")   Wt (!) 134 kg (295 lb)   LMP 09/30/2017   SpO2 94%   BMI 46.20 kg/m²  Body mass index is 46.2 kg/m².    Gen: Alert and oriented, No apparent distress. Well developed  HEENT: NCAT, MMM  Neck: Supple, FROM  Chest: No deformities, Equal chest expansion  Lungs: Normal effort, CTA bilaterally, no wheezes, rhonchi, or rales  CV: Regular rate and rhythm. No murmurs, rubs, or gallops. Pulse palpable  Abd: Non-distended  Ext: No clubbing, cyanosis, edema.  Skin: Warm/dry, without rashes  Neuro: A/O x 4, CN 2-12 Grossly intact, Motor/sensory grossly intact  Psych: Normal behavior, normal affect      Christ Hughes MD  [facility] Resident    "

## 2023-07-26 ENCOUNTER — TELEPHONE (OUTPATIENT)
Dept: MEDICAL GROUP | Facility: CLINIC | Age: 53
End: 2023-07-26
Payer: MEDICARE

## 2023-07-26 NOTE — TELEPHONE ENCOUNTER
Caller Name: Cortney Gavin   Call Back Number: [phone]     How would the patient prefer to be contacted with a response: Phone call OK to leave a detailed message    Lab results.   Cortney lives in [place] and transportation is difficult. Is she able to get lab results via phone.

## 2023-08-02 ENCOUNTER — HOSPITAL ENCOUNTER (OUTPATIENT)
Facility: MEDICAL CENTER | Age: 53
End: 2023-08-02
Attending: REGISTERED NURSE
Payer: MEDICARE

## 2023-08-02 ENCOUNTER — NON-PROVIDER VISIT (OUTPATIENT)
Dept: MEDICAL GROUP | Facility: CLINIC | Age: 53
End: 2023-08-02
Payer: MEDICARE

## 2023-08-02 LAB
FORWARD REASON: SPWHY: NORMAL
FORWARDED TO LAB: SPWHR: NORMAL
SPECIMEN SENT (2ND): SPWT2: NORMAL
SPECIMEN SENT (3RD): SPWT3: NORMAL
SPECIMEN SENT (4TH): SPWT4: NORMAL
SPECIMEN SENT: SPWT1: NORMAL

## 2023-08-02 PROCEDURE — 36415 COLL VENOUS BLD VENIPUNCTURE: CPT | Performed by: PHYSICIAN ASSISTANT

## 2023-08-02 NOTE — PROGRESS NOTES
Cortney Gavin is a 53 y.o. female here for a non-provider visit for a lab draw on 8/2/2023 at 7:26 AM.    Procedure performed:  Venipuncture     Anatomical site:  Left Antecubital Area    Equipment used:  21 g vacutainer     Labs drawn:  Comp Metabolic Panel , CBC with differential , Lipid Profile, Vitamin D , TSH, and T4, T3    Ordering provider:  AYANA Arita    Lab draw completed by:  Richard Blackman Ass't

## 2023-08-07 ENCOUNTER — APPOINTMENT (OUTPATIENT)
Dept: MEDICAL GROUP | Facility: CLINIC | Age: 53
End: 2023-08-07
Payer: MEDICARE

## 2023-08-07 ENCOUNTER — OFFICE VISIT (OUTPATIENT)
Dept: MEDICAL GROUP | Facility: CLINIC | Age: 53
End: 2023-08-07
Payer: MEDICARE

## 2023-08-07 VITALS — WEIGHT: 291 LBS | BODY MASS INDEX: 45.58 KG/M2

## 2023-08-07 DIAGNOSIS — E78.00 PURE HYPERCHOLESTEROLEMIA: ICD-10-CM

## 2023-08-07 DIAGNOSIS — R73.01 ELEVATED FASTING BLOOD SUGAR: ICD-10-CM

## 2023-08-07 DIAGNOSIS — E66.01 MORBID OBESITY WITH BMI OF 40.0-44.9, ADULT (HCC): ICD-10-CM

## 2023-08-07 LAB
HBA1C MFR BLD: 5.1 % (ref ?–5.8)
POCT INT CON NEG: NEGATIVE
POCT INT CON POS: POSITIVE

## 2023-08-07 PROCEDURE — 99213 OFFICE O/P EST LOW 20 MIN: CPT | Mod: GE | Performed by: STUDENT IN AN ORGANIZED HEALTH CARE EDUCATION/TRAINING PROGRAM

## 2023-08-07 PROCEDURE — 83036 HEMOGLOBIN GLYCOSYLATED A1C: CPT | Performed by: STUDENT IN AN ORGANIZED HEALTH CARE EDUCATION/TRAINING PROGRAM

## 2023-08-07 RX ORDER — SODIUM CHLORIDE 9 MG/ML
INJECTION, SOLUTION INTRAVENOUS
COMMUNITY
Start: 2023-05-18 | End: 2023-08-07

## 2023-08-07 RX ORDER — ATORVASTATIN CALCIUM 40 MG/1
TABLET, FILM COATED ORAL
COMMUNITY
Start: 2023-08-03

## 2023-08-07 RX ORDER — OXYCODONE HYDROCHLORIDE 20 MG/1
TABLET ORAL
COMMUNITY
Start: 2023-07-12 | End: 2023-08-07

## 2023-08-07 RX ORDER — CYCLOBENZAPRINE HCL 10 MG
TABLET ORAL
COMMUNITY
Start: 2023-05-15 | End: 2023-08-07

## 2023-08-07 RX ORDER — OXYCODONE HYDROCHLORIDE 10 MG/1
TABLET ORAL
COMMUNITY
Start: 2023-05-15

## 2023-08-07 RX ORDER — KETOROLAC TROMETHAMINE 15 MG/ML
INJECTION, SOLUTION INTRAMUSCULAR; INTRAVENOUS
COMMUNITY
Start: 2023-05-18 | End: 2023-08-07

## 2023-08-07 RX ORDER — ONDANSETRON 2 MG/ML
INJECTION INTRAMUSCULAR; INTRAVENOUS
COMMUNITY
Start: 2023-05-18 | End: 2023-08-07

## 2023-08-07 RX ORDER — CARIPRAZINE 1.5 MG/1
CAPSULE, GELATIN COATED ORAL
COMMUNITY
Start: 2023-07-26 | End: 2023-08-07

## 2023-08-07 RX ORDER — PREGABALIN 200 MG/1
CAPSULE ORAL
COMMUNITY
Start: 2023-06-05 | End: 2023-08-07

## 2023-08-07 ASSESSMENT — ENCOUNTER SYMPTOMS
MUSCULOSKELETAL NEGATIVE: 1
RESPIRATORY NEGATIVE: 1
CARDIOVASCULAR NEGATIVE: 1
NEUROLOGICAL NEGATIVE: 1
EYES NEGATIVE: 1
CONSTITUTIONAL NEGATIVE: 1
HEARTBURN: 1

## 2023-08-07 ASSESSMENT — FIBROSIS 4 INDEX: FIB4 SCORE: 1.37

## 2023-08-07 NOTE — ASSESSMENT & PLAN NOTE
Recommend patient continue with lesser modifications including increasing the amount of vegetables she eats as well as increasing her daily fiber intake.  -Continue with Ozempic 0.5 mg once weekly.   2 seconds or less

## 2023-08-07 NOTE — PROGRESS NOTES
Subjective:     CC:   Chief Complaint   Patient presents with    Follow-Up     Lab results           HPI:   Cortney presents today with discussed the lab results.  She reports that they were taking in early August.  She has a history of elevated glucose readings.  Currently on Ozempic 0.5 mg every 7 days.  She also takes Abilify, Synthroid, lithium, oxycodone and trazodone.  Patient also like to discuss her cholesterol results that she is on Lipitor 40 mg.  She sees mental health NP will also order labs.  Also reports an increased amount of urination for the past few months and some chest pains that occur on and off for the past few months.  Primarily the center of her chest.  Worse with movements, worse with food no shortness of breath, dyspnea on exertion, chest pain related to exertion.    Problem   Pure Hypercholesterolemia   Elevated Fasting Blood Sugar    Reports a history of elevated fasting blood glucose levels.  A1c last year 5.1.     Morbid obesity with BMI of 40.0-44.9, adult ([facility])    Has tried multiple different diet plans and weight watchers. Never tried medication for weight loss.    Eats pasta, rice, corn, lots of vegetables, breads. Fast food once a month. 4L of soda a month.    Reports craving sugars most days.               Patient Active Problem List   Diagnosis    Morbid obesity with BMI of 40.0-44.9, adult ([facility])    Chronic bilateral thoracic back pain    Bipolar 1 disorder ([facility])    Primary insomnia    Memory problem    Pain of right hip joint    Chronic pain of both knees    Chronic pain of left ankle    Pure hypercholesterolemia    Elevated fasting blood sugar    Iron deficiency anemia secondary to inadequate dietary iron intake    Routine physical examination    Vitamin D deficiency    Anxiety    Calculus of common bile duct without obstruction    Cholelithiasis without obstruction    Depressive disorder    Peripheral venous insufficiency    Schizophrenia ([facility])    Seasonal allergies    Seizure  disorder (HCC)    Elevated liver enzymes    Subclinical hypothyroidism    Gastroesophageal reflux disease without esophagitis    Low folate    Other chronic pain    Polyneuropathy, unspecified    Spondylosis without myelopathy or radiculopathy, lumbosacral region    Pain passing urine    Acute cystitis with hematuria    Mixed stress and urge urinary incontinence    Encounter for weight loss counseling    Need for vaccination    Other fatigue    Light headed       Current Outpatient Medications Ordered in Epic   Medication Sig Dispense Refill    atorvastatin (LIPITOR) 40 MG Tab       oxyCODONE immediate release (ROXICODONE) 10 MG immediate release tablet       omeprazole (PRILOSEC) 20 MG delayed-release capsule TAKE 1 CAPSULE BY MOUTH ONCE DAILY 30 Capsule 0    Semaglutide,0.25 or 0.5MG/DOS, (OZEMPIC, 0.25 OR 0.5 MG/DOSE,) 2 MG/3ML Solution Pen-injector Inject 0.5 mg under the skin every 7 days. 9 mL 3    levothyroxine (SYNTHROID) 25 MCG Tab TAKE ONE TABLET BY MOUTH EVERY MORNING ON AN EMPTY STOMACH 90 Tablet 0    methocarbamol (ROBAXIN) 750 MG Tab TAKE 1 TO 2 TABLETS BY MOUTH THREE TIMES DAILY AS NEEDED FOR SPASMS      meloxicam (MOBIC) 15 MG tablet Take 15 mg by mouth every day.      lithium carbonate 300 MG capsule Take 300 mg by mouth at bedtime.      ARIPiprazole (ABILIFY) 15 MG Tab Take 15 mg by mouth at bedtime.      pregabalin (LYRICA) 300 MG capsule Take 300 mg by mouth 2 times a day.      Naloxone (NARCAN) 4 MG/0.1ML Liquid       traZODone (DESYREL) 50 MG Tab Take 25 mg by mouth every evening.       No current Epic-ordered facility-administered medications on file.       ROS:  Review of Systems   Constitutional: Negative.    HENT: Negative.     Eyes: Negative.    Respiratory: Negative.     Cardiovascular: Negative.    Gastrointestinal:  Positive for heartburn.   Genitourinary:  Positive for frequency.   Musculoskeletal: Negative.    Skin: Negative.    Neurological: Negative.    Endo/Heme/Allergies:  "Negative.          Objective:     Exam:  BP (P) 118/68 (BP Location: Left arm, Patient Position: Sitting, BP Cuff Size: Large adult)   Pulse (P) 74   Temp (P) 36.4 °C (97.5 °F) (Temporal)   Resp (P) 14   Ht (P) 1.753 m (5' 9\")   Wt (!) 132 kg (291 lb)   LMP 09/30/2017   SpO2 (P) 95%   BMI (P) 42.97 kg/m²  Body mass index is 42.97 kg/m² (pended).    Physical Exam  Constitutional:       Appearance: Normal appearance.   HENT:      Head: Normocephalic and atraumatic.      Nose: Nose normal.      Mouth/Throat:      Mouth: Mucous membranes are moist.      Pharynx: Oropharynx is clear.   Eyes:      Extraocular Movements: Extraocular movements intact.      Conjunctiva/sclera: Conjunctivae normal.   Cardiovascular:      Rate and Rhythm: Normal rate and regular rhythm.      Pulses: Normal pulses.      Heart sounds: Normal heart sounds.   Pulmonary:      Effort: Pulmonary effort is normal.      Breath sounds: Normal breath sounds.   Abdominal:      General: Abdomen is flat.      Palpations: Abdomen is soft.   Musculoskeletal:         General: Tenderness present. Normal range of motion.      Cervical back: Normal range of motion.      Comments: Tenderness to palpation over sternum.   Skin:     General: Skin is warm.      Capillary Refill: Capillary refill takes less than 2 seconds.   Neurological:      General: No focal deficit present.      Mental Status: She is alert and oriented to person, place, and time.             Labs: 1C today 5.1.    Assessment & Plan:     53 y.o. female with the following -     Problem List Items Addressed This Visit       Morbid obesity with BMI of 40.0-44.9, adult (HCC)     Recommend patient continue with lesser modifications including increasing the amount of vegetables she eats as well as increasing her daily fiber intake.  -Continue with Ozempic 0.5 mg once weekly.         Pure hypercholesterolemia     Unable to review lipid results as labs were sent to her medical nontraditional.  " Discussed calling the clinic and having the labs sent to us.         Relevant Medications    atorvastatin (LIPITOR) 40 MG Tab    Elevated fasting blood sugar     A1C today 5.1  Continue Ozempic 0.5 mg weekly.         Relevant Orders    POCT  A1C (Completed)       Discussed concerning features of chest pain and when to return to emergency department or call healthcare provider.  These can include chest pain or shortness of breath, shortness of breath on exertion, chest pain worse with exertion.  Musculoskeletal chest pain such as chest pain that is worse when you press on her chest is unlikely to be heart conditions.  I also recommended trialing some Tums or over-the-counter Nexium for some heartburn his possible history of having GERD like symptoms secondary to her Ozempic.    Discussed with patient that her increased urinary frequency could be possibly secondary to her lithium use she does have a pending lithium level with her mental provider.  Recommend that she follow-up with them.  Patient reports she has a appointment tomorrow.  Patient denies any dysuria, changes in odor, increased urgency.  Just reports she is going slightly more than usual.  No fevers no chills.      No follow-ups on file.

## 2023-08-07 NOTE — ASSESSMENT & PLAN NOTE
Unable to review lipid results as labs were sent to her medical nontraditional.  Discussed calling the clinic and having the labs sent to us.

## 2023-08-14 ENCOUNTER — APPOINTMENT (OUTPATIENT)
Dept: RADIOLOGY | Facility: MEDICAL CENTER | Age: 53
End: 2023-08-14
Attending: PHYSICAL MEDICINE & REHABILITATION
Payer: MEDICARE

## 2023-08-17 ENCOUNTER — PATIENT MESSAGE (OUTPATIENT)
Dept: MEDICAL GROUP | Facility: CLINIC | Age: 53
End: 2023-08-17

## 2023-08-17 ENCOUNTER — OFFICE VISIT (OUTPATIENT)
Dept: MEDICAL GROUP | Facility: CLINIC | Age: 53
End: 2023-08-17
Payer: MEDICARE

## 2023-08-17 ENCOUNTER — APPOINTMENT (OUTPATIENT)
Dept: MEDICAL GROUP | Facility: CLINIC | Age: 53
End: 2023-08-17
Payer: MEDICARE

## 2023-08-17 DIAGNOSIS — R73.01 ELEVATED FASTING BLOOD SUGAR: ICD-10-CM

## 2023-08-17 DIAGNOSIS — E66.01 MORBID OBESITY WITH BMI OF 40.0-44.9, ADULT (HCC): ICD-10-CM

## 2023-08-17 PROCEDURE — 99442 PR PHYSICIAN TELEPHONE EVALUATION 11-20 MIN: CPT | Mod: GE | Performed by: STUDENT IN AN ORGANIZED HEALTH CARE EDUCATION/TRAINING PROGRAM

## 2023-08-17 RX ORDER — DICLOFENAC SODIUM 75 MG/1
TABLET, DELAYED RELEASE ORAL
COMMUNITY
Start: 2023-08-11

## 2023-08-17 RX ORDER — SUVOREXANT 15 MG/1
TABLET, FILM COATED ORAL
COMMUNITY
Start: 2023-08-08

## 2023-08-17 RX ORDER — BREXPIPRAZOLE 1 MG/1
TABLET ORAL
COMMUNITY
Start: 2023-08-08

## 2023-08-17 NOTE — ASSESSMENT & PLAN NOTE
Improving, patient has been incorporating lifestyle changes in tandem with 0.5 mg Ozempic weekly.  This has resulted in a 35 pound weight loss.  I congratulated the patient for her successes.  -Her goal is to get to 220 pounds, the weight threshold at which she was told she could undergo bilateral knee replacement surgery  -In order to help her lose this additional 65 pounds, it is appropriate for us to increase her semaglutide dose, especially as she is still at quite a low dose  -Is tolerating semaglutide without adverse reactions  -Will increase to 1 mg weekly  -Follow-up in 1 month to discuss increased to 1.7 mg weekly if needed/if tolerating 1 mg/week appropriately

## 2023-08-17 NOTE — PROGRESS NOTES
Subjective     Cortney Gavin is a 53 y.o. female who presents with Follow-Up (Lab results )            HPI  Morbid obesity  Patient has had a weight loss of nearly 35 pounds, from 320 down to 285.  She attributes her success both to the medication into lifestyle changes that she has made.  She has been very conscious with monitoring caloric intake and has been cautious with avoiding carbohydrates, especially simple carbs.    Denies any adverse effects from semaglutide, tolerating medication well.  She did lose about 15 pounds in the first 2 or 3 months, has been consistently losing weight at a steady pace since then with the addition of lifestyle changes.    ROS  A 12 point review of systems was performed with the patient with pertinent positives and negatives as above otherwise found to be within normal limits.         Objective     LMP 09/30/2017      Physical Exam  Due to the telephonic nature of this visit, a thorough physical exam was not performed.  Nevertheless, patient not dyspneic, conversational on the phone, in no acute distress, demonstrates good insight.                      Assessment & Plan        Problem List Items Addressed This Visit       Morbid obesity with BMI of 40.0-44.9, adult (HCC)     Improving, patient has been incorporating lifestyle changes in tandem with 0.5 mg Ozempic weekly.  This has resulted in a 35 pound weight loss.  I congratulated the patient for her successes.  -Her goal is to get to 220 pounds, the weight threshold at which she was told she could undergo bilateral knee replacement surgery  -In order to help her lose this additional 65 pounds, it is appropriate for us to increase her semaglutide dose, especially as she is still at quite a low dose  -Is tolerating semaglutide without adverse reactions  -Will increase to 1 mg weekly  -Follow-up in 1 month to discuss increased to 1.7 mg weekly if needed/if tolerating 1 mg/week appropriately         Elevated fasting  blood sugar     Previous A1c 5.1, repeat A1c 5.1.  See obesity above for remaining assessment and plan.          Addendum: 15 min spent with patient on the telephone.    This patient was discussed with my attending physician  Phoenix Mason MD

## 2023-08-24 ENCOUNTER — TELEPHONE (OUTPATIENT)
Dept: MEDICAL GROUP | Facility: CLINIC | Age: 53
End: 2023-08-24
Payer: MEDICARE

## 2023-08-24 NOTE — TELEPHONE ENCOUNTER
Elayne Mason,  ICD code for Semaglutide is not being accepted by the insurance company since the pt is not diabetic. Please advise. Thank you

## 2023-09-29 ENCOUNTER — TELEPHONE (OUTPATIENT)
Dept: MEDICAL GROUP | Facility: CLINIC | Age: 53
End: 2023-09-29
Payer: MEDICARE

## 2023-09-29 NOTE — TELEPHONE ENCOUNTER
VOICEMAIL  1. Caller Name: [facility]                    Call Back Number: [phone] cell and [phone] for the pharmacy.      2. Message: RX for Ozempic was suspended. Patient was under the impression that she was still suppose to be on the medication. If she is still suppose to be on the medication, please send in a new prescription.     3. Patient approves office to leave a detailed voicemail/MyChart message: N\A

## 2023-10-01 DIAGNOSIS — E03.8 SUBCLINICAL HYPOTHYROIDISM: ICD-10-CM

## 2023-10-01 DIAGNOSIS — R53.83 OTHER FATIGUE: ICD-10-CM

## 2023-10-09 ENCOUNTER — APPOINTMENT (OUTPATIENT)
Dept: MEDICAL GROUP | Facility: CLINIC | Age: 53
End: 2023-10-09
Payer: MEDICARE

## 2023-10-13 ENCOUNTER — APPOINTMENT (OUTPATIENT)
Dept: MEDICAL GROUP | Facility: CLINIC | Age: 53
End: 2023-10-13
Payer: MEDICARE

## 2023-11-06 DIAGNOSIS — K21.9 GASTROESOPHAGEAL REFLUX DISEASE WITHOUT ESOPHAGITIS: ICD-10-CM

## 2023-11-07 DIAGNOSIS — E03.8 SUBCLINICAL HYPOTHYROIDISM: ICD-10-CM

## 2023-11-08 RX ORDER — LEVOTHYROXINE SODIUM 0.03 MG/1
25 TABLET ORAL
Qty: 60 TABLET | Refills: 0 | Status: SHIPPED | OUTPATIENT
Start: 2023-11-08 | End: 2023-11-13 | Stop reason: SDUPTHER

## 2023-11-10 RX ORDER — OMEPRAZOLE 20 MG/1
20 CAPSULE, DELAYED RELEASE ORAL DAILY
Qty: 60 CAPSULE | Refills: 0 | Status: SHIPPED | OUTPATIENT
Start: 2023-11-10 | End: 2023-11-13 | Stop reason: SDUPTHER

## 2023-11-13 ENCOUNTER — OFFICE VISIT (OUTPATIENT)
Dept: MEDICAL GROUP | Facility: CLINIC | Age: 53
End: 2023-11-13
Payer: MEDICAID

## 2023-11-13 VITALS — BODY MASS INDEX: 43.4 KG/M2 | WEIGHT: 293 LBS | HEIGHT: 69 IN

## 2023-11-13 DIAGNOSIS — E66.01 MORBID OBESITY WITH BMI OF 40.0-44.9, ADULT (HCC): ICD-10-CM

## 2023-11-13 DIAGNOSIS — E03.8 SUBCLINICAL HYPOTHYROIDISM: ICD-10-CM

## 2023-11-13 DIAGNOSIS — K21.9 GASTROESOPHAGEAL REFLUX DISEASE WITHOUT ESOPHAGITIS: ICD-10-CM

## 2023-11-13 PROCEDURE — 99442 PR PHYSICIAN TELEPHONE EVALUATION 11-20 MIN: CPT | Mod: GE | Performed by: BEHAVIOR ANALYST

## 2023-11-13 RX ORDER — OMEPRAZOLE 20 MG/1
20 CAPSULE, DELAYED RELEASE ORAL DAILY
Qty: 60 CAPSULE | Refills: 0 | Status: SHIPPED | OUTPATIENT
Start: 2023-11-13

## 2023-11-13 RX ORDER — LEVOTHYROXINE SODIUM 0.03 MG/1
25 TABLET ORAL
Qty: 60 TABLET | Refills: 0 | Status: SHIPPED | OUTPATIENT
Start: 2023-11-13

## 2023-11-13 ASSESSMENT — FIBROSIS 4 INDEX: FIB4 SCORE: 1.37

## 2023-11-14 NOTE — PROGRESS NOTES
Subjective:     This was a telephone visit approved by  Praveena; time of call was 430pm to 445pm; 15min visit; and patient consented to telephone visit.     CC:   Chief Complaint   Patient presents with    [facility]     Med refill       HPI:   Cortney presents today with:    #bone spurs   -s/p surgery  -healing well  -following w/ surgeon    #gerd  -refill request for ppi  -omeprazole refilled    #obesity  -refill request for ozempic  -tolerating, states dose per Dr. Hughes was to be inc to 2mg/wk    #hypothyroid  -TSH ordered  -refill levothyroxine  -pt asx at this time    Pt has labs due from few months ago, she goes to lab in [place], so we will fax labs over to [facility] facility.    Pt to have labs done and f/u in clinic.     No problems updated.    Current Outpatient Medications Ordered in Epic   Medication Sig Dispense Refill    levothyroxine (SYNTHROID) 25 MCG Tab Take 1 Tablet by mouth every morning on an empty stomach. 60 Tablet 0    omeprazole (PRILOSEC) 20 MG delayed-release capsule Take 1 Capsule by mouth every day. 60 Capsule 0    Semaglutide, 1 MG/DOSE, 4 MG/3ML Solution Pen-injector Inject 2 mg under the skin every 7 days. 3 mL 5    BELSOMRA 15 MG Tab       diclofenac DR (VOLTAREN) 75 MG Tablet Delayed Response       REXULTI 1 MG Tab       atorvastatin (LIPITOR) 40 MG Tab       oxyCODONE immediate release (ROXICODONE) 10 MG immediate release tablet       lithium carbonate 300 MG capsule Take 300 mg by mouth at bedtime.      ARIPiprazole (ABILIFY) 15 MG Tab Take 15 mg by mouth at bedtime.      pregabalin (LYRICA) 300 MG capsule Take 300 mg by mouth 2 times a day.      methocarbamol (ROBAXIN) 750 MG Tab TAKE 1 TO 2 TABLETS BY MOUTH THREE TIMES DAILY AS NEEDED FOR SPASMS (Patient not taking: Reported on 11/13/2023)      meloxicam (MOBIC) 15 MG tablet Take 15 mg by mouth every day. (Patient not taking: Reported on 11/13/2023)      Naloxone (NARCAN) 4 MG/0.1ML Liquid       traZODone (DESYREL)  50 MG Tab Take 25 mg by mouth every evening. (Patient not taking: Reported on 8/17/2023)       No current Epic-ordered facility-administered medications on file.       ROS:  Negative except for above in HPI    Objective:     Deferred as this was a televisit    Assessment & Plan:     See hpi    Problem List Items Addressed This Visit       Morbid obesity with BMI of 40.0-44.9, adult (HCC)    Relevant Medications    Semaglutide, 1 MG/DOSE, 4 MG/3ML Solution Pen-injector    Subclinical hypothyroidism    Relevant Medications    levothyroxine (SYNTHROID) 25 MCG Tab    Gastroesophageal reflux disease without esophagitis    Relevant Medications    omeprazole (PRILOSEC) 20 MG delayed-release capsule       No follow-ups on file.

## 2024-07-02 RX ORDER — ATORVASTATIN CALCIUM 40 MG/1
40 TABLET, FILM COATED ORAL DAILY
Qty: 90 TABLET | Refills: 0 | Status: SHIPPED | OUTPATIENT
Start: 2024-07-02

## (undated) DEVICE — PADDING CAST 4 IN X 4 YDS - SOF-ROLL (12RL/BG 6BG/CT)

## (undated) DEVICE — GLOVE, LITE (PAIR)

## (undated) DEVICE — GLOVE BIOGEL INDICATOR SZ 7.5 SURGICAL PF LTX - (50PR/BX 4BX/CA)

## (undated) DEVICE — SUTURE 2-0 VICRYL PLUS CT-1 - 8 X 18 INCH(12/BX)

## (undated) DEVICE — HEAD HOLDER JUNIOR/ADULT

## (undated) DEVICE — SUCTION INSTRUMENT YANKAUER BULBOUS TIP W/O VENT (50EA/CA)

## (undated) DEVICE — WRAP CO-FLEX 4IN X 5YD STERIL - SELF-ADHERENT (18/CA)

## (undated) DEVICE — ELECTRODE 850 FOAM ADHESIVE - HYDROGEL RADIOTRNSPRNT (50/PK)

## (undated) DEVICE — LACTATED RINGERS INJ 1000 ML - (14EA/CA 60CA/PF)

## (undated) DEVICE — SYRINGE 10 ML CONTROL LL (25EA/BX 4BX/CA)

## (undated) DEVICE — STAPLER SKIN DISP - (6/BX 10BX/CA) VISISTAT

## (undated) DEVICE — CUFF TOURNIQUET 44 X 4 ONE PORT DISP - STERILE (10/CA)

## (undated) DEVICE — PADDING CAST 6 IN STERILE - 6 X 4 YDS (24/CA)

## (undated) DEVICE — ELECTRODE DUAL RETURN W/ CORD - (50/PK)

## (undated) DEVICE — TUBE CONNECTING SUCTION - CLEAR PLASTIC STERILE 72 IN (50EA/CA)

## (undated) DEVICE — DRESSING XEROFORM 1X8 - (50/BX 4BX/CA)

## (undated) DEVICE — KIT ANESTHESIA W/CIRCUIT & 3/LT BAG W/FILTER (20EA/CA)

## (undated) DEVICE — DRAPE FLUOROSCAN C-ARM - 54 X 78 (40EA/CA)

## (undated) DEVICE — BANDAGE ELASTIC STERILE VELCRO 6 X 5 YDS (25EA/CA)

## (undated) DEVICE — MASK ANESTHESIA ADULT  - (100/CA)

## (undated) DEVICE — NEPTUNE 4 PORT MANIFOLD - (20/PK)

## (undated) DEVICE — PACK LOWER EXTREMITY - (2/CA)

## (undated) DEVICE — GLOVE BIOGEL SZ 8 SURGICAL PF LTX - (50PR/BX 4BX/CA)

## (undated) DEVICE — SPLINT PLASTER 5 IN X 30 IN - (50EA/BX 6BX/CA)

## (undated) DEVICE — GLOVE BIOGEL PI ULTRATOUCH SZ 8.0 SURGICAL PF LF - (50/BX 4BX/CA)

## (undated) DEVICE — GLOVE BIOGEL SZ 7.5 SURGICAL PF LTX - (50PR/BX 4BX/CA)

## (undated) DEVICE — SUTURE 0 VICRYL PLUS CT-1 - 8 X 18 INCH (12/BX)

## (undated) DEVICE — BLADE SURGICAL #15 - (50/BX 3BX/CA)

## (undated) DEVICE — CANISTER SUCTION RIGID RED 1500CC (40EA/CA)

## (undated) DEVICE — PROTECTOR ULNA NERVE - (36PR/CA)

## (undated) DEVICE — KIT ROOM DECONTAMINATION

## (undated) DEVICE — HUMID-VENT HEAT AND MOISTURE EXCHANGE- (50/BX)

## (undated) DEVICE — GLOVE BIOGEL PI ORTHO SZ 7.5 PF LF (40PR/BX)

## (undated) DEVICE — GLOVE BIOGEL SZ 7 SURGICAL PF LTX - (50PR/BX 4BX/CA)

## (undated) DEVICE — DRAPE LARGE 3 QUARTER - (20/CA)

## (undated) DEVICE — GOWN SURGEONS X-LARGE - DISP. (30/CA)

## (undated) DEVICE — COTTON ROLL 1 POUND BIOSEAL - (5RL/CA)

## (undated) DEVICE — SENSOR SPO2 NEO LNCS ADHESIVE (20/BX) SEE USER NOTES

## (undated) DEVICE — SUTURE GENERAL

## (undated) DEVICE — BAG, SPONGE COUNT 50600

## (undated) DEVICE — GLOVE BIOGEL PI INDICATOR SZ 7.5 SURGICAL PF LF -(50/BX 4BX/CA)

## (undated) DEVICE — WATER IRRIGATION STERILE 1000ML (12EA/CA)

## (undated) DEVICE — GOWN WARMING STANDARD FLEX - (30/CA)

## (undated) DEVICE — SODIUM CHL IRRIGATION 0.9% 1000ML (12EA/CA)

## (undated) DEVICE — PAD LAP STERILE 18 X 18 - (5/PK 40PK/CA)

## (undated) DEVICE — MASK AIRWAY SIZE 3 UNIQUE SILICON (10/BX)

## (undated) DEVICE — DRAPE C-ARM LARGE 41IN X 74 IN - (10/BX 2BX/CA)

## (undated) DEVICE — WRAP COBAN SELF-ADHERENT 6 IN X  5YDS STERILE TAN (12/CA)

## (undated) DEVICE — SPONGE GAUZESTER 4 X 4 4PLY - (128PK/CA)